# Patient Record
Sex: MALE | Race: WHITE | NOT HISPANIC OR LATINO | Employment: OTHER | URBAN - METROPOLITAN AREA
[De-identification: names, ages, dates, MRNs, and addresses within clinical notes are randomized per-mention and may not be internally consistent; named-entity substitution may affect disease eponyms.]

---

## 2018-01-04 ENCOUNTER — HOSPITAL ENCOUNTER (EMERGENCY)
Facility: HOSPITAL | Age: 63
Discharge: HOME | End: 2018-01-04
Attending: EMERGENCY MEDICINE | Admitting: EMERGENCY MEDICINE
Payer: MEDICARE

## 2018-01-04 ENCOUNTER — APPOINTMENT (EMERGENCY)
Dept: RADIOLOGY | Facility: HOSPITAL | Age: 63
End: 2018-01-04
Payer: MEDICARE

## 2018-01-04 VITALS
RESPIRATION RATE: 18 BRPM | TEMPERATURE: 97.8 F | SYSTOLIC BLOOD PRESSURE: 175 MMHG | DIASTOLIC BLOOD PRESSURE: 96 MMHG | WEIGHT: 188 LBS | HEART RATE: 74 BPM | OXYGEN SATURATION: 98 %

## 2018-01-04 DIAGNOSIS — L02.91 ABSCESS: Primary | ICD-10-CM

## 2018-01-04 LAB
ANION GAP SERPL CALCULATED.3IONS-SCNC: 10 MMOL/L (ref 4–13)
BASOPHILS # BLD AUTO: 0.05 THOUSANDS/ΜL (ref 0–0.1)
BASOPHILS NFR BLD AUTO: 1 % (ref 0–1)
BUN SERPL-MCNC: 18 MG/DL (ref 5–25)
CALCIUM SERPL-MCNC: 9.4 MG/DL (ref 8.3–10.1)
CHLORIDE SERPL-SCNC: 105 MMOL/L (ref 100–108)
CO2 SERPL-SCNC: 24 MMOL/L (ref 21–32)
CREAT SERPL-MCNC: 1.13 MG/DL (ref 0.6–1.3)
EOSINOPHIL # BLD AUTO: 0.08 THOUSAND/ΜL (ref 0–0.61)
EOSINOPHIL NFR BLD AUTO: 1 % (ref 0–6)
ERYTHROCYTE [DISTWIDTH] IN BLOOD BY AUTOMATED COUNT: 12.9 % (ref 11.6–15.1)
GFR SERPL CREATININE-BSD FRML MDRD: 68 ML/MIN/1.73SQ M
GLUCOSE SERPL-MCNC: 112 MG/DL (ref 65–140)
HCT VFR BLD AUTO: 42.5 % (ref 36.5–49.3)
HGB BLD-MCNC: 14.5 G/DL (ref 12–17)
LYMPHOCYTES # BLD AUTO: 1.58 THOUSANDS/ΜL (ref 0.6–4.47)
LYMPHOCYTES NFR BLD AUTO: 24 % (ref 14–44)
MCH RBC QN AUTO: 31.9 PG (ref 26.8–34.3)
MCHC RBC AUTO-ENTMCNC: 34.1 G/DL (ref 31.4–37.4)
MCV RBC AUTO: 93 FL (ref 82–98)
MONOCYTES # BLD AUTO: 0.52 THOUSAND/ΜL (ref 0.17–1.22)
MONOCYTES NFR BLD AUTO: 8 % (ref 4–12)
NEUTROPHILS # BLD AUTO: 4.3 THOUSANDS/ΜL (ref 1.85–7.62)
NEUTS SEG NFR BLD AUTO: 66 % (ref 43–75)
NRBC BLD AUTO-RTO: 0 /100 WBCS
PLATELET # BLD AUTO: 159 THOUSANDS/UL (ref 149–390)
PMV BLD AUTO: 10.3 FL (ref 8.9–12.7)
POTASSIUM SERPL-SCNC: 4.2 MMOL/L (ref 3.5–5.3)
RBC # BLD AUTO: 4.55 MILLION/UL (ref 3.88–5.62)
SODIUM SERPL-SCNC: 139 MMOL/L (ref 136–145)
URATE SERPL-MCNC: 6.2 MG/DL (ref 4.2–8)
WBC # BLD AUTO: 6.53 THOUSAND/UL (ref 4.31–10.16)

## 2018-01-04 PROCEDURE — 99283 EMERGENCY DEPT VISIT LOW MDM: CPT

## 2018-01-04 PROCEDURE — 84550 ASSAY OF BLOOD/URIC ACID: CPT | Performed by: PHYSICIAN ASSISTANT

## 2018-01-04 PROCEDURE — 36415 COLL VENOUS BLD VENIPUNCTURE: CPT | Performed by: PHYSICIAN ASSISTANT

## 2018-01-04 PROCEDURE — 85025 COMPLETE CBC W/AUTO DIFF WBC: CPT | Performed by: PHYSICIAN ASSISTANT

## 2018-01-04 PROCEDURE — 73630 X-RAY EXAM OF FOOT: CPT

## 2018-01-04 PROCEDURE — 96365 THER/PROPH/DIAG IV INF INIT: CPT

## 2018-01-04 PROCEDURE — 96375 TX/PRO/DX INJ NEW DRUG ADDON: CPT

## 2018-01-04 PROCEDURE — 80048 BASIC METABOLIC PNL TOTAL CA: CPT | Performed by: PHYSICIAN ASSISTANT

## 2018-01-04 RX ORDER — CLINDAMYCIN HYDROCHLORIDE 150 MG/1
150 CAPSULE ORAL 4 TIMES DAILY
Qty: 40 CAPSULE | Refills: 0 | Status: SHIPPED | OUTPATIENT
Start: 2018-01-04 | End: 2018-01-14

## 2018-01-04 RX ORDER — CLINDAMYCIN PHOSPHATE 600 MG/50ML
600 INJECTION INTRAVENOUS ONCE
Status: COMPLETED | OUTPATIENT
Start: 2018-01-04 | End: 2018-01-04

## 2018-01-04 RX ORDER — CLONAZEPAM 1 MG/1
1 TABLET ORAL
COMMUNITY

## 2018-01-04 RX ORDER — HYDROCODONE BITARTRATE AND ACETAMINOPHEN 5; 325 MG/1; MG/1
1 TABLET ORAL EVERY 6 HOURS PRN
Qty: 10 TABLET | Refills: 0 | Status: SHIPPED | OUTPATIENT
Start: 2018-01-04

## 2018-01-04 RX ORDER — KETOROLAC TROMETHAMINE 30 MG/ML
15 INJECTION, SOLUTION INTRAMUSCULAR; INTRAVENOUS ONCE
Status: COMPLETED | OUTPATIENT
Start: 2018-01-04 | End: 2018-01-04

## 2018-01-04 RX ORDER — SIMVASTATIN 10 MG
40 TABLET ORAL
COMMUNITY

## 2018-01-04 RX ORDER — LIDOCAINE HYDROCHLORIDE 10 MG/ML
10 INJECTION, SOLUTION EPIDURAL; INFILTRATION; INTRACAUDAL; PERINEURAL ONCE
Status: COMPLETED | OUTPATIENT
Start: 2018-01-04 | End: 2018-01-04

## 2018-01-04 RX ORDER — HYDROCODONE BITARTRATE AND ACETAMINOPHEN 5; 325 MG/1; MG/1
1 TABLET ORAL ONCE
Status: COMPLETED | OUTPATIENT
Start: 2018-01-04 | End: 2018-01-04

## 2018-01-04 RX ADMIN — CLINDAMYCIN PHOSPHATE 600 MG: 12 INJECTION, SOLUTION INTRAMUSCULAR; INTRAVENOUS at 14:14

## 2018-01-04 RX ADMIN — HYDROCODONE BITARTRATE AND ACETAMINOPHEN 1 TABLET: 5; 325 TABLET ORAL at 15:11

## 2018-01-04 RX ADMIN — SODIUM CHLORIDE 1000 ML: 0.9 INJECTION, SOLUTION INTRAVENOUS at 14:13

## 2018-01-04 RX ADMIN — LIDOCAINE HYDROCHLORIDE 10 ML: 10 INJECTION, SOLUTION EPIDURAL; INFILTRATION; INTRACAUDAL; PERINEURAL at 13:50

## 2018-01-04 RX ADMIN — KETOROLAC TROMETHAMINE 15 MG: 30 INJECTION, SOLUTION INTRAMUSCULAR at 14:30

## 2018-01-04 NOTE — ED PROVIDER NOTES
History  Chief Complaint   Patient presents with    Wound Check     Pt states he has a foot wound on his fourth digit  Pt states he saw his pcp on tuesday and they gave him abx  pt reportincrease swelling an redness to the the foot  Pt states it is very painful  Pt denies fevers/lossof sensation in the foot  Per ptno active bleeding      This 15-year-old male patient who presents with pain and redness dorsum of his right 4th digit  Over his D IP this been going on for 1 week  His family doctor came some oral antibiotics approximately 3 days ago without improvement is now worsening  He states the toe without getting swollen and the foot is as well  It hurts to touch her to walk or to move  No fever no chills no headache no blurred vision or double vision no cough congestion sore throat no nausea vomiting diarrhea abdominal pain no chest pain or shortness of breath  Differential diagnosis includes rhythm not limited to paronychia, abscess, gout  Prior to Admission Medications   Prescriptions Last Dose Informant Patient Reported? Taking? Aspirin 81 MG EC tablet 1/3/2018 at Unknown time  Yes Yes   Sig: Take 81 mg by mouth daily at bedtime     clonazePAM (KLONOPIN) 1 mg tablet 1/3/2018 at Unknown time  Yes Yes   Sig: Take 1 mg by mouth daily at bedtime     simvastatin (ZOCOR) 10 mg tablet 1/3/2018 at Unknown time  Yes Yes   Sig: Take 40 mg by mouth daily at bedtime        Facility-Administered Medications: None       History reviewed  No pertinent past medical history  History reviewed  No pertinent surgical history  History reviewed  No pertinent family history  I have reviewed and agree with the history as documented  Social History   Substance Use Topics    Smoking status: Never Smoker    Smokeless tobacco: Never Used    Alcohol use No        Review of Systems   All other systems reviewed and are negative        Physical Exam  ED Triage Vitals [01/04/18 1228]   Temperature Pulse Respirations Blood Pressure SpO2   97 8 °F (36 6 °C) 83 18 154/88 98 %      Temp src Heart Rate Source Patient Position - Orthostatic VS BP Location FiO2 (%)   -- Monitor Sitting Right arm --      Pain Score       Worst Possible Pain           Orthostatic Vital Signs  Vitals:    01/04/18 1228 01/04/18 1422   BP: 154/88 (!) 175/96   Pulse: 83 74   Patient Position - Orthostatic VS: Sitting        Physical Exam   Constitutional: He appears well-developed and well-nourished  HENT:   Head: Normocephalic and atraumatic  Right Ear: External ear normal    Left Ear: External ear normal    Nose: Nose normal    Mouth/Throat: Oropharynx is clear and moist    Eyes: Conjunctivae are normal  Pupils are equal, round, and reactive to light  Neck: Normal range of motion  Neck supple  Cardiovascular: Normal rate and regular rhythm  Pulmonary/Chest: Effort normal and breath sounds normal    Abdominal: Soft  Bowel sounds are normal  There is no tenderness  Musculoskeletal: Normal range of motion  Feet:    Neurological: He is alert  Skin: Skin is warm  Psychiatric: He has a normal mood and affect  His behavior is normal    Nursing note and vitals reviewed        ED Medications  Medications   sodium chloride 0 9 % bolus 1,000 mL (1,000 mL Intravenous New Bag 1/4/18 1413)   lidocaine (PF) (XYLOCAINE-MPF) 1 % injection 10 mL (10 mL Infiltration Given 1/4/18 1350)   ketorolac (TORADOL) injection 15 mg (15 mg Intravenous Given 1/4/18 1430)   clindamycin (CLEOCIN) IVPB (premix) 600 mg (600 mg Intravenous New Bag 1/4/18 1414)       Diagnostic Studies  Results Reviewed     Procedure Component Value Units Date/Time    Basic metabolic panel [[id number]] Collected:  01/04/18 1413    Lab Status:  Final result Specimen:  Blood from Arm, Right Updated:  01/04/18 1444     Sodium 139 mmol/L      Potassium 4 2 mmol/L      Chloride 105 mmol/L      CO2 24 mmol/L      Anion Gap 10 mmol/L      BUN 18 mg/dL      Creatinine 1 13 mg/dL Glucose 112 mg/dL      Calcium 9 4 mg/dL      eGFR 68 ml/min/1 73sq m     Narrative:         National Kidney Disease Education Program recommendations are as follows:  GFR calculation is accurate only with a steady state creatinine  Chronic Kidney disease less than 60 ml/min/1 73 sq  meters  Kidney failure less than 15 ml/min/1 73 sq  meters  Uric acid [[id number]]  (Normal) Collected:  01/04/18 1413    Lab Status:  Final result Specimen:  Blood from Arm, Right Updated:  01/04/18 1444     Uric Acid 6 2 mg/dL     CBC and differential [[id number]]  (Normal) Collected:  01/04/18 1413    Lab Status:  Final result Specimen:  Blood from Arm, Right Updated:  01/04/18 1421     WBC 6 53 Thousand/uL      RBC 4 55 Million/uL      Hemoglobin 14 5 g/dL      Hematocrit 42 5 %      MCV 93 fL      MCH 31 9 pg      MCHC 34 1 g/dL      RDW 12 9 %      MPV 10 3 fL      Platelets 654 Thousands/uL      nRBC 0 /100 WBCs      Neutrophils Relative 66 %      Lymphocytes Relative 24 %      Monocytes Relative 8 %      Eosinophils Relative 1 %      Basophils Relative 1 %      Neutrophils Absolute 4 30 Thousands/µL      Lymphocytes Absolute 1 58 Thousands/µL      Monocytes Absolute 0 52 Thousand/µL      Eosinophils Absolute 0 08 Thousand/µL      Basophils Absolute 0 05 Thousands/µL                  XR foot 3+ views RIGHT   Final Result by Ashok Christopher DO (01/04 1431)      Fourth digit soft tissue swelling with no acute osseous abnormality or bony erosion/destruction  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           Workstation performed: [id number]                    Procedures  Incision/Drainage  Date/Time: 1/4/2018 2:05 PM  Performed by: Anthoney Essex  Authorized by: Baldo Or     Patient location:  ED  Consent:     Consent obtained:  Verbal and written    Consent given by:  Patient    Risks discussed:  Bleeding, incomplete drainage and pain    Alternatives discussed:  Referral  Universal protocol:     Procedure explained and questions answered to patient or proxy's satisfaction: yes      Patient identity confirmed:  Verbally with patient  Location:     Type:  Abscess    Size:  2    Location: Right 4th digit toe  Pre-procedure details:     Skin preparation:  Chloraprep  Anesthesia (see MAR for exact dosages): Anesthesia method:  Local infiltration    Local anesthetic:  Lidocaine 1% w/o epi  Procedure details:     Complexity:  Simple    Needle aspiration: no      Incision types:  Stab incision    Scalpel blade:  11    Approach:  Open    Incision depth:  Skin    Wound management:  Probed and deloculated    Drainage:  Purulent    Drainage amount: Moderate    Wound treatment:  Wound left open    Packing materials:  None  Post-procedure details:     Patient tolerance of procedure: Tolerated well, no immediate complications           Phone Contacts  ED Phone Contact    ED Course  ED Course as of Jan 09 0612   Thu Jan 04, 2018   1446 BUN: 18                               MDM  CritCare Time    Disposition  Final diagnoses:   Abscess     Time reflects when diagnosis was documented in both MDM as applicable and the Disposition within this note     Time User Action Codes Description Comment    1/4/2018  2:45 PM [facility], [address] [facility] Add [L02 91] Abscess       ED Disposition     ED Disposition Condition Comment    Discharge  [facility] discharge to home/self care  Condition at discharge: Good        Follow-up Information     Follow up With Specialties Details Why Contact Info    Radha Darby MD [facility] Medicine Schedule an appointment as soon as possible for a visit Stop the antibiotic she was given yesterday and start the antibiotics were given today  [address] [address] [facility]          Patient's Medications   Discharge Prescriptions    CLINDAMYCIN (CLEOCIN) 150 MG CAPSULE    Take 1 capsule by mouth 4 (four) times a day for 10 days       Start Date: 1/4/2018  End Date: 1/14/2018       Order Dose: 150 mg       Quantity: 40 capsule    Refills: 0    DICLOFENAC SODIUM (VOLTAREN) 50 MG EC TABLET    Take 1 tablet by mouth 2 (two) times a day       Start Date: 1/4/2018  End Date: --       Order Dose: 50 mg       Quantity: 8 tablet    Refills: 0     No discharge procedures on file      ED Provider  Electronically Signed by           Lorelee Bloch, PA-C  01/04/18 [facility]  01/04/18 [facility]  01/09/18 3877

## 2018-01-04 NOTE — DISCHARGE INSTRUCTIONS
Abscess   WHAT YOU NEED TO KNOW:   A warm compress may help your abscess drain  Your healthcare provider may make a cut in the abscess so it can drain  You may need surgery to remove an abscess that is on your hands or buttocks  DISCHARGE INSTRUCTIONS:   Return to the emergency department if:   · The area around your abscess becomes very painful, warm, or has red streaks  · You have a fever and chills  · Your heart is beating faster than usual      · You feel faint or confused  Contact your healthcare provider if:   · Your abscess gets bigger or does not get better  · Your abscess returns  · You have questions or concerns about your condition or care  Medicines: You may  need any of the following:  · Antibiotics  help treat a bacterial infection  · Acetaminophen  decreases pain and fever  It is available without a doctor's order  Ask how much to take and how often to take it  Follow directions  Acetaminophen can cause liver damage if not taken correctly  · NSAIDs , such as ibuprofen, help decrease swelling, pain, and fever  This medicine is available with or without a doctor's order  NSAIDs can cause stomach bleeding or kidney problems in certain people  If you take blood thinner medicine, always ask your healthcare provider if NSAIDs are safe for you  Always read the medicine label and follow directions  · Take your medicine as directed  Contact your healthcare provider if you think your medicine is not helping or if you have side effects  Tell him or her if you are allergic to any medicine  Keep a list of the medicines, vitamins, and herbs you take  Include the amounts, and when and why you take them  Bring the list or the pill bottles to follow-up visits  Carry your medicine list with you in case of an emergency  Self-care:   · Apply a warm compress to your abscess  This will help it open and drain  Wet a washcloth in warm, but not hot, water  Apply the compress for 10 minutes  Repeat this 4 times each day  Do not  press on an abscess or try to open it with a needle  You may push the bacteria deeper or into your blood  · Do not share your clothes, towels, or sheets with anyone  This can spread the infection to others  · Wash your hands often  This can help prevent the spread of germs  Use soap and water or an alcohol-based hand rub  Care for your wound after it is drained:   · Care for your wound as directed  If your healthcare provider says it is okay, carefully remove the bandage and gauze packing  You may need to soak the gauze to get it out of your wound  Clean your wound and the area around it as directed  Dry the area and put on new, clean bandages  Change your bandages when they get wet or dirty  · Ask your healthcare provider how to change the gauze in your wound  Keep track of how many pieces of gauze are placed inside the wound  Do not put too much packing in the wound  Do not pack the gauze too tightly in your wound  Follow up with your healthcare provider in 1 to 3 days: You may need to have your packing removed or your bandage changed  Write down your questions so you remember to ask them during your visits  © 2017 [employer]  Information is for End User's use only and may not be sold, redistributed or otherwise used for commercial purposes  All illustrations and images included in CareNotes® are the copyrighted property of [employer]  or [employer]  The above information is an  only  It is not intended as medical advice for individual conditions or treatments  Talk to your doctor, nurse or pharmacist before following any medical regimen to see if it is safe and effective for you

## 2019-04-29 ENCOUNTER — TRANSCRIBE ORDERS (OUTPATIENT)
Dept: PHYSICAL THERAPY | Facility: MEDICAL CENTER | Age: 64
End: 2019-04-29

## 2019-04-29 ENCOUNTER — EVALUATION (OUTPATIENT)
Dept: PHYSICAL THERAPY | Facility: MEDICAL CENTER | Age: 64
End: 2019-04-29
Payer: MEDICARE

## 2019-04-29 DIAGNOSIS — M25.562 LEFT KNEE PAIN, UNSPECIFIED CHRONICITY: Primary | ICD-10-CM

## 2019-04-29 DIAGNOSIS — Z98.890 S/P ARTHROSCOPIC PARTIAL MEDIAL MENISCECTOMY: ICD-10-CM

## 2019-04-29 PROCEDURE — 97161 PT EVAL LOW COMPLEX 20 MIN: CPT | Performed by: PHYSICAL THERAPIST

## 2019-04-29 PROCEDURE — 97110 THERAPEUTIC EXERCISES: CPT | Performed by: PHYSICAL THERAPIST

## 2019-05-03 ENCOUNTER — OFFICE VISIT (OUTPATIENT)
Dept: PHYSICAL THERAPY | Facility: MEDICAL CENTER | Age: 64
End: 2019-05-03
Payer: MEDICARE

## 2019-05-03 DIAGNOSIS — Z98.890 S/P ARTHROSCOPIC PARTIAL MEDIAL MENISCECTOMY: ICD-10-CM

## 2019-05-03 DIAGNOSIS — M25.562 LEFT KNEE PAIN, UNSPECIFIED CHRONICITY: Primary | ICD-10-CM

## 2019-05-03 PROCEDURE — 97110 THERAPEUTIC EXERCISES: CPT | Performed by: PHYSICAL THERAPIST

## 2019-05-03 PROCEDURE — 97112 NEUROMUSCULAR REEDUCATION: CPT | Performed by: PHYSICAL THERAPIST

## 2019-05-06 ENCOUNTER — OFFICE VISIT (OUTPATIENT)
Dept: PHYSICAL THERAPY | Facility: MEDICAL CENTER | Age: 64
End: 2019-05-06
Payer: MEDICARE

## 2019-05-06 DIAGNOSIS — Z98.890 S/P ARTHROSCOPIC PARTIAL MEDIAL MENISCECTOMY: ICD-10-CM

## 2019-05-06 DIAGNOSIS — M25.562 LEFT KNEE PAIN, UNSPECIFIED CHRONICITY: Primary | ICD-10-CM

## 2019-05-06 PROCEDURE — 97110 THERAPEUTIC EXERCISES: CPT | Performed by: PHYSICAL THERAPIST

## 2019-05-06 PROCEDURE — 97112 NEUROMUSCULAR REEDUCATION: CPT | Performed by: PHYSICAL THERAPIST

## 2019-05-09 ENCOUNTER — OFFICE VISIT (OUTPATIENT)
Dept: PHYSICAL THERAPY | Facility: MEDICAL CENTER | Age: 64
End: 2019-05-09
Payer: MEDICARE

## 2019-05-09 DIAGNOSIS — Z98.890 S/P ARTHROSCOPIC PARTIAL MEDIAL MENISCECTOMY: ICD-10-CM

## 2019-05-09 DIAGNOSIS — M25.562 LEFT KNEE PAIN, UNSPECIFIED CHRONICITY: Primary | ICD-10-CM

## 2019-05-09 PROCEDURE — 97112 NEUROMUSCULAR REEDUCATION: CPT

## 2019-05-09 PROCEDURE — 97110 THERAPEUTIC EXERCISES: CPT

## 2019-05-13 ENCOUNTER — OFFICE VISIT (OUTPATIENT)
Dept: PHYSICAL THERAPY | Facility: MEDICAL CENTER | Age: 64
End: 2019-05-13
Payer: MEDICARE

## 2019-05-13 DIAGNOSIS — Z98.890 S/P ARTHROSCOPIC PARTIAL MEDIAL MENISCECTOMY: ICD-10-CM

## 2019-05-13 DIAGNOSIS — M25.562 LEFT KNEE PAIN, UNSPECIFIED CHRONICITY: Primary | ICD-10-CM

## 2019-05-13 PROCEDURE — 97110 THERAPEUTIC EXERCISES: CPT | Performed by: PHYSICAL THERAPIST

## 2019-05-13 PROCEDURE — 97112 NEUROMUSCULAR REEDUCATION: CPT | Performed by: PHYSICAL THERAPIST

## 2019-05-16 ENCOUNTER — OFFICE VISIT (OUTPATIENT)
Dept: PHYSICAL THERAPY | Facility: MEDICAL CENTER | Age: 64
End: 2019-05-16
Payer: MEDICARE

## 2019-05-16 DIAGNOSIS — M25.562 LEFT KNEE PAIN, UNSPECIFIED CHRONICITY: Primary | ICD-10-CM

## 2019-05-16 DIAGNOSIS — Z98.890 S/P ARTHROSCOPIC PARTIAL MEDIAL MENISCECTOMY: ICD-10-CM

## 2019-05-16 PROCEDURE — 97110 THERAPEUTIC EXERCISES: CPT

## 2019-05-16 PROCEDURE — 97112 NEUROMUSCULAR REEDUCATION: CPT

## 2019-05-20 ENCOUNTER — OFFICE VISIT (OUTPATIENT)
Dept: PHYSICAL THERAPY | Facility: MEDICAL CENTER | Age: 64
End: 2019-05-20
Payer: MEDICARE

## 2019-05-20 DIAGNOSIS — M25.562 LEFT KNEE PAIN, UNSPECIFIED CHRONICITY: Primary | ICD-10-CM

## 2019-05-20 DIAGNOSIS — Z98.890 S/P ARTHROSCOPIC PARTIAL MEDIAL MENISCECTOMY: ICD-10-CM

## 2019-05-20 PROCEDURE — 97112 NEUROMUSCULAR REEDUCATION: CPT

## 2019-05-20 PROCEDURE — 97110 THERAPEUTIC EXERCISES: CPT

## 2019-05-23 ENCOUNTER — OFFICE VISIT (OUTPATIENT)
Dept: PHYSICAL THERAPY | Facility: MEDICAL CENTER | Age: 64
End: 2019-05-23
Payer: MEDICARE

## 2019-05-23 DIAGNOSIS — M25.562 LEFT KNEE PAIN, UNSPECIFIED CHRONICITY: Primary | ICD-10-CM

## 2019-05-23 DIAGNOSIS — Z98.890 S/P ARTHROSCOPIC PARTIAL MEDIAL MENISCECTOMY: ICD-10-CM

## 2019-05-23 PROCEDURE — 97112 NEUROMUSCULAR REEDUCATION: CPT | Performed by: PHYSICAL THERAPIST

## 2019-05-23 PROCEDURE — 97110 THERAPEUTIC EXERCISES: CPT | Performed by: PHYSICAL THERAPIST

## 2019-05-28 ENCOUNTER — OFFICE VISIT (OUTPATIENT)
Dept: PHYSICAL THERAPY | Facility: MEDICAL CENTER | Age: 64
End: 2019-05-28
Payer: MEDICARE

## 2019-05-28 DIAGNOSIS — M25.562 LEFT KNEE PAIN, UNSPECIFIED CHRONICITY: Primary | ICD-10-CM

## 2019-05-28 DIAGNOSIS — Z98.890 S/P ARTHROSCOPIC PARTIAL MEDIAL MENISCECTOMY: ICD-10-CM

## 2019-05-28 PROCEDURE — 97110 THERAPEUTIC EXERCISES: CPT

## 2019-05-28 PROCEDURE — 97112 NEUROMUSCULAR REEDUCATION: CPT

## 2019-05-30 ENCOUNTER — OFFICE VISIT (OUTPATIENT)
Dept: PHYSICAL THERAPY | Facility: MEDICAL CENTER | Age: 64
End: 2019-05-30
Payer: MEDICARE

## 2019-05-30 DIAGNOSIS — Z98.890 S/P ARTHROSCOPIC PARTIAL MEDIAL MENISCECTOMY: ICD-10-CM

## 2019-05-30 DIAGNOSIS — M25.562 LEFT KNEE PAIN, UNSPECIFIED CHRONICITY: Primary | ICD-10-CM

## 2019-05-30 PROCEDURE — 97112 NEUROMUSCULAR REEDUCATION: CPT | Performed by: PHYSICAL THERAPIST

## 2019-05-30 PROCEDURE — 97110 THERAPEUTIC EXERCISES: CPT | Performed by: PHYSICAL THERAPIST

## 2019-06-02 ENCOUNTER — HOSPITAL ENCOUNTER (EMERGENCY)
Facility: HOSPITAL | Age: 64
Discharge: HOME | End: 2019-06-02
Attending: EMERGENCY MEDICINE | Admitting: EMERGENCY MEDICINE
Payer: MEDICARE

## 2019-06-02 ENCOUNTER — APPOINTMENT (EMERGENCY)
Dept: NON INVASIVE DIAGNOSTICS | Facility: HOSPITAL | Age: 64
End: 2019-06-02
Payer: MEDICARE

## 2019-06-02 VITALS
DIASTOLIC BLOOD PRESSURE: 86 MMHG | HEART RATE: 86 BPM | SYSTOLIC BLOOD PRESSURE: 165 MMHG | WEIGHT: 191.36 LBS | RESPIRATION RATE: 18 BRPM | TEMPERATURE: 97.9 F | OXYGEN SATURATION: 94 %

## 2019-06-02 DIAGNOSIS — I82.402 LEG DVT (DEEP VENOUS THROMBOEMBOLISM), ACUTE, LEFT (HCC): Primary | ICD-10-CM

## 2019-06-02 PROCEDURE — 99284 EMERGENCY DEPT VISIT MOD MDM: CPT | Performed by: EMERGENCY MEDICINE

## 2019-06-02 PROCEDURE — 93970 EXTREMITY STUDY: CPT

## 2019-06-02 PROCEDURE — 99283 EMERGENCY DEPT VISIT LOW MDM: CPT

## 2019-06-02 RX ADMIN — RIVAROXABAN 15 MG: 15 TABLET, FILM COATED ORAL at 20:17

## 2019-06-03 ENCOUNTER — OFFICE VISIT (OUTPATIENT)
Dept: PHYSICAL THERAPY | Facility: MEDICAL CENTER | Age: 64
End: 2019-06-03
Payer: MEDICARE

## 2019-06-03 DIAGNOSIS — Z98.890 S/P ARTHROSCOPIC PARTIAL MEDIAL MENISCECTOMY: ICD-10-CM

## 2019-06-03 DIAGNOSIS — M25.562 LEFT KNEE PAIN, UNSPECIFIED CHRONICITY: Primary | ICD-10-CM

## 2019-06-03 PROCEDURE — 97112 NEUROMUSCULAR REEDUCATION: CPT

## 2019-06-03 PROCEDURE — 97110 THERAPEUTIC EXERCISES: CPT

## 2019-06-03 PROCEDURE — 93970 EXTREMITY STUDY: CPT | Performed by: SURGERY

## 2019-06-06 ENCOUNTER — OFFICE VISIT (OUTPATIENT)
Dept: PHYSICAL THERAPY | Facility: MEDICAL CENTER | Age: 64
End: 2019-06-06
Payer: MEDICARE

## 2019-06-06 DIAGNOSIS — Z98.890 S/P ARTHROSCOPIC PARTIAL MEDIAL MENISCECTOMY: ICD-10-CM

## 2019-06-06 DIAGNOSIS — M25.562 LEFT KNEE PAIN, UNSPECIFIED CHRONICITY: Primary | ICD-10-CM

## 2019-06-06 PROCEDURE — 97110 THERAPEUTIC EXERCISES: CPT

## 2019-06-06 PROCEDURE — 97112 NEUROMUSCULAR REEDUCATION: CPT

## 2019-06-10 ENCOUNTER — OFFICE VISIT (OUTPATIENT)
Dept: PHYSICAL THERAPY | Facility: MEDICAL CENTER | Age: 64
End: 2019-06-10
Payer: MEDICARE

## 2019-06-10 DIAGNOSIS — M25.562 LEFT KNEE PAIN, UNSPECIFIED CHRONICITY: Primary | ICD-10-CM

## 2019-06-10 DIAGNOSIS — Z98.890 S/P ARTHROSCOPIC PARTIAL MEDIAL MENISCECTOMY: ICD-10-CM

## 2019-06-10 PROCEDURE — 97110 THERAPEUTIC EXERCISES: CPT

## 2019-06-10 PROCEDURE — 97112 NEUROMUSCULAR REEDUCATION: CPT

## 2019-06-13 ENCOUNTER — OFFICE VISIT (OUTPATIENT)
Dept: PHYSICAL THERAPY | Facility: MEDICAL CENTER | Age: 64
End: 2019-06-13
Payer: MEDICARE

## 2019-06-13 DIAGNOSIS — Z98.890 S/P ARTHROSCOPIC PARTIAL MEDIAL MENISCECTOMY: ICD-10-CM

## 2019-06-13 DIAGNOSIS — M25.562 LEFT KNEE PAIN, UNSPECIFIED CHRONICITY: Primary | ICD-10-CM

## 2019-06-13 PROCEDURE — 97110 THERAPEUTIC EXERCISES: CPT

## 2019-06-13 PROCEDURE — 97112 NEUROMUSCULAR REEDUCATION: CPT

## 2019-06-17 ENCOUNTER — OFFICE VISIT (OUTPATIENT)
Dept: PHYSICAL THERAPY | Facility: MEDICAL CENTER | Age: 64
End: 2019-06-17
Payer: MEDICARE

## 2019-06-17 DIAGNOSIS — M25.562 LEFT KNEE PAIN, UNSPECIFIED CHRONICITY: Primary | ICD-10-CM

## 2019-06-17 DIAGNOSIS — Z98.890 S/P ARTHROSCOPIC PARTIAL MEDIAL MENISCECTOMY: ICD-10-CM

## 2019-06-17 PROCEDURE — 97112 NEUROMUSCULAR REEDUCATION: CPT

## 2019-06-17 PROCEDURE — 97110 THERAPEUTIC EXERCISES: CPT

## 2019-06-20 ENCOUNTER — OFFICE VISIT (OUTPATIENT)
Dept: PHYSICAL THERAPY | Facility: MEDICAL CENTER | Age: 64
End: 2019-06-20
Payer: MEDICARE

## 2019-06-20 DIAGNOSIS — Z98.890 S/P ARTHROSCOPIC PARTIAL MEDIAL MENISCECTOMY: ICD-10-CM

## 2019-06-20 DIAGNOSIS — M25.562 LEFT KNEE PAIN, UNSPECIFIED CHRONICITY: Primary | ICD-10-CM

## 2019-06-20 PROCEDURE — 97110 THERAPEUTIC EXERCISES: CPT | Performed by: PHYSICAL THERAPIST

## 2019-06-20 PROCEDURE — 97112 NEUROMUSCULAR REEDUCATION: CPT | Performed by: PHYSICAL THERAPIST

## 2019-06-24 ENCOUNTER — OFFICE VISIT (OUTPATIENT)
Dept: PHYSICAL THERAPY | Facility: MEDICAL CENTER | Age: 64
End: 2019-06-24
Payer: MEDICARE

## 2019-06-24 DIAGNOSIS — Z98.890 S/P ARTHROSCOPIC PARTIAL MEDIAL MENISCECTOMY: ICD-10-CM

## 2019-06-24 DIAGNOSIS — M25.562 LEFT KNEE PAIN, UNSPECIFIED CHRONICITY: Primary | ICD-10-CM

## 2019-06-24 PROCEDURE — 97140 MANUAL THERAPY 1/> REGIONS: CPT | Performed by: PHYSICAL THERAPIST

## 2019-06-24 PROCEDURE — 97110 THERAPEUTIC EXERCISES: CPT | Performed by: PHYSICAL THERAPIST

## 2019-06-24 PROCEDURE — 97112 NEUROMUSCULAR REEDUCATION: CPT | Performed by: PHYSICAL THERAPIST

## 2019-06-27 ENCOUNTER — OFFICE VISIT (OUTPATIENT)
Dept: PHYSICAL THERAPY | Facility: MEDICAL CENTER | Age: 64
End: 2019-06-27
Payer: MEDICARE

## 2019-06-27 DIAGNOSIS — Z98.890 S/P ARTHROSCOPIC PARTIAL MEDIAL MENISCECTOMY: ICD-10-CM

## 2019-06-27 DIAGNOSIS — M25.562 LEFT KNEE PAIN, UNSPECIFIED CHRONICITY: Primary | ICD-10-CM

## 2019-06-27 PROCEDURE — 97110 THERAPEUTIC EXERCISES: CPT | Performed by: PHYSICAL THERAPIST

## 2019-07-08 ENCOUNTER — OFFICE VISIT (OUTPATIENT)
Dept: PHYSICAL THERAPY | Facility: MEDICAL CENTER | Age: 64
End: 2019-07-08
Payer: MEDICARE

## 2019-07-08 DIAGNOSIS — Z98.890 S/P ARTHROSCOPIC PARTIAL MEDIAL MENISCECTOMY: ICD-10-CM

## 2019-07-08 DIAGNOSIS — M25.562 LEFT KNEE PAIN, UNSPECIFIED CHRONICITY: Primary | ICD-10-CM

## 2019-07-08 PROCEDURE — 97140 MANUAL THERAPY 1/> REGIONS: CPT | Performed by: PHYSICAL THERAPIST

## 2019-07-08 PROCEDURE — 97110 THERAPEUTIC EXERCISES: CPT | Performed by: PHYSICAL THERAPIST

## 2019-07-08 NOTE — PROGRESS NOTES
Daily Note     Today's date: 2019  Patient name: Ravi Lunsford  : 1955  MRN: [id number]  Referring provider: Reina Zamora MD  Dx:   Encounter Diagnosis     ICD-10-CM    1  Left knee pain, unspecified chronicity M25 562    2  S/P arthroscopic partial medial meniscectomy Z98 890          Subjective: Pt reports that he decreased his activity level while on vacation, but the best he felt was when he got home and mowed the lawn and did some gardening  Pt feels like he is compensating when he walks and goes up the stairs to protect his knee  This morning his knee felt worse than prior to vacation and the only thing he can think of it that it has to do with the humid weather  Objective: See treatment diary below      Assessment: Tolerated treatment fair  Patient would benefit from continued PT  Pt responded well to tx today and had decreased pain post tx  Pt would benefit from moving normally during functional activities and avoiding repetative activities that cause pain  Plan: Continue per plan of care        Daily Treatment Diary       Exercise Diary  6/3 6/6 6/10 6/13 6/17 6/20 6/24 6/27 7/8    Bike  10' 10' 10' 10 10' 8' 10' 10' 10'    HS str 3x30" 3x30" 3x30" 3x30" 3x30" 3x30" 3x30" 3x30" 3x30"    Gastroc str 3x30" 3x30" 3x30" 3x30" 3x30" 3x30" 3x30" 3x30" 3x30"    Heel slides x30 x30 x30 x30 x30 D/C x30 NR x30    QS 5"  x50 5"  x50 5"  x50 5"  x50 5"  x50 5"  x50 5"  x50 5"  x50 5"  x50    SLR x 4 3x12 3x15 1#  3x10 3#  3x10 3#  3x12 3#  3x12 3#  3x10 3#  3x10 1#  3x10    Prone TKE [place]  21#  3x15 [place]  21#  3x15 [place]  22#  3x10 [place]  22#  3x10 [place]  25#  3x10 [place]  32#  3x15 NP SAQ  3x15  ecc focus NP    Prone HS curls 3x12 3x15 1#  3x10 3#  3x10 3#  3x10 3#  3x12 3#  3x10 3#  3x10 1#  3x10    Wall slides 3x15 5#  3x10 10#  3x10 10#  3x10 10#  3x10 10#  3x12 NP NP NP    Bridging         3x10    SLS  np np 3x 3x 3x W/  alpha  3x NP NP NP    Heel raises bilat  3x10 bilat  3x10 SL  3x10 SL  3x10 SL  3x10 SL  3x12 NP NP NP    Step up over and down L3  3x15 L3  3x15 L3  3x15 L3  3x15 np np NP NP NP    Quad str 3x30" 3x30" 3x30" 3x30" 3x30" 3x30" 3x30" 3x30" 3x30"    Standing HS curls 3x15 3x15 3x15 3x15 3x15 1#  3x10 0#  3x10 3x10  0# NP    LP np 120#  3x10 130#  3x10 150#  3x10 150#  3x15 150#  3x15 NP NP NP    Step downs     L1  3x10 L2  3x10 NP NP NP    Medial and lat tibial rot mobs grade II       HK NP NP    Knee flexion jt mobs grade II       HK NP NP    HPL medial knee       HK NP HK    KT tape L MCL         KO    MH       15' NP NP

## 2019-07-11 ENCOUNTER — OFFICE VISIT (OUTPATIENT)
Dept: PHYSICAL THERAPY | Facility: MEDICAL CENTER | Age: 64
End: 2019-07-11
Payer: MEDICARE

## 2019-07-11 DIAGNOSIS — Z98.890 S/P ARTHROSCOPIC PARTIAL MEDIAL MENISCECTOMY: ICD-10-CM

## 2019-07-11 DIAGNOSIS — M25.562 LEFT KNEE PAIN, UNSPECIFIED CHRONICITY: Primary | ICD-10-CM

## 2019-07-11 PROCEDURE — 97110 THERAPEUTIC EXERCISES: CPT

## 2019-07-15 ENCOUNTER — OFFICE VISIT (OUTPATIENT)
Dept: PHYSICAL THERAPY | Facility: MEDICAL CENTER | Age: 64
End: 2019-07-15
Payer: MEDICARE

## 2019-07-15 DIAGNOSIS — M25.562 LEFT KNEE PAIN, UNSPECIFIED CHRONICITY: Primary | ICD-10-CM

## 2019-07-15 DIAGNOSIS — Z98.890 S/P ARTHROSCOPIC PARTIAL MEDIAL MENISCECTOMY: ICD-10-CM

## 2019-07-15 PROCEDURE — 97110 THERAPEUTIC EXERCISES: CPT

## 2019-07-15 NOTE — PROGRESS NOTES
Daily Note     Today's date: 7/15/2019  Patient name: Alex Fields  : 1955  MRN: [id number]  Referring provider: Ilya Guerrero MD  Dx:   Encounter Diagnosis     ICD-10-CM    1  Left knee pain, unspecified chronicity M25 562    2  S/P arthroscopic partial medial meniscectomy Z98 890                   Subjective: Pt reports that his knee felt great with a full game of golf, but stiffened up after he relaxed and sat down  Objective: See treatment diary below      Assessment: Tolerated treatment well  Patient demonstrated fatigue post treatment, exhibited good technique with therapeutic exercises and would benefit from continued PT  Pt to slowly integrate other ex's at following PT visits if able  Plan: Continue per plan of care        Daily Treatment Diary       Exercise Diary  7/15            Bike  10'            HS str 3x30"            Gastroc str 3x30"            Heel slides x30            QS 5"  x50            SLR x 4 1#  3x15            Prone TKE SAQ  1#  3x15            Prone HS curls 1#  3x10            Wall slides np            Bridging 3x15            SLS  np            Heel raises bilat  3x10            Step up over and down np            Quad str 3x30"            Standing HS curls 3x15            LP np            Step downs np            Medial and lat tibial rot mobs grade II np            Knee flexion jt mobs grade II np            HPL medial knee np            KT tape L MCL KO            MH np

## 2019-07-18 ENCOUNTER — OFFICE VISIT (OUTPATIENT)
Dept: PHYSICAL THERAPY | Facility: MEDICAL CENTER | Age: 64
End: 2019-07-18
Payer: MEDICARE

## 2019-07-18 DIAGNOSIS — Z98.890 S/P ARTHROSCOPIC PARTIAL MEDIAL MENISCECTOMY: ICD-10-CM

## 2019-07-18 DIAGNOSIS — M25.562 LEFT KNEE PAIN, UNSPECIFIED CHRONICITY: Primary | ICD-10-CM

## 2019-07-18 PROCEDURE — 97112 NEUROMUSCULAR REEDUCATION: CPT

## 2019-07-18 PROCEDURE — 97110 THERAPEUTIC EXERCISES: CPT

## 2019-07-18 NOTE — PROGRESS NOTES
Daily Note     Today's date: 2019  Patient name: Alex Fields  : 1955  MRN: [id number]  Referring provider: Ilya Guerrero MD  Dx:   Encounter Diagnosis     ICD-10-CM    1  Left knee pain, unspecified chronicity M25 562    2  S/P arthroscopic partial medial meniscectomy Z98 890                   Subjective: Pt reports that his knee felt good with his golf game, but experienced some discomfort and "twinges" in his L knee when walking down hills  Objective: See treatment diary below      Assessment: Tolerated treatment well  Patient demonstrated fatigue post treatment, exhibited good technique with therapeutic exercises and would benefit from continued PT  Pt experienced mm fatigue with progressions made today  Plan: Continue per plan of care        Daily Treatment Diary       Exercise Diary  7/15 7/18           Bike  10' 10'           HS str 3x30" 3x30"           Gastroc str 3x30" 3x30"           Heel slides x30 x30           QS 5"  x50 5"  x50           SLR x 4 1#  3x15 2#  3x10           Prone TKE SAQ  1#  3x15 SAQ  2#  3x10           Prone HS curls 1#  3x10 2#  3x10           Wall slides np            Bridging 3x15 3x15           SLS  np W/  alpha  3x           Heel raises bilat  3x10 bilat  3x12           Step up over and down np np           Quad str 3x30" 3x30"           Standing HS curls 3x15 3x15           LP np np           Step downs np np           Medial and lat tibial rot mobs grade II np            Knee flexion jt mobs grade II np            HPL medial knee np            KT tape L MCL KO KO           MH np

## 2019-07-22 ENCOUNTER — OFFICE VISIT (OUTPATIENT)
Dept: PHYSICAL THERAPY | Facility: MEDICAL CENTER | Age: 64
End: 2019-07-22
Payer: MEDICARE

## 2019-07-22 DIAGNOSIS — Z98.890 S/P ARTHROSCOPIC PARTIAL MEDIAL MENISCECTOMY: ICD-10-CM

## 2019-07-22 DIAGNOSIS — M25.562 LEFT KNEE PAIN, UNSPECIFIED CHRONICITY: Primary | ICD-10-CM

## 2019-07-22 PROCEDURE — 97112 NEUROMUSCULAR REEDUCATION: CPT | Performed by: PHYSICAL THERAPIST

## 2019-07-22 PROCEDURE — 97110 THERAPEUTIC EXERCISES: CPT | Performed by: PHYSICAL THERAPIST

## 2019-07-22 NOTE — PROGRESS NOTES
Daily Note     Today's date: 2019  Patient name: Maco Andrade  : 1955  MRN: [id number]  Referring provider: Maria Marvin MD  Dx:   Encounter Diagnosis     ICD-10-CM    1  Left knee pain, unspecified chronicity M25 562    2  S/P arthroscopic partial medial meniscectomy Z98 890          Subjective: Pt reports that his knee is marginally better  He feels like his activity tolerance is slowly improving  Objective: See treatment diary below      Assessment: Tolerated treatment well  Patient demonstrated fatigue post treatment, exhibited good technique with therapeutic exercises and would benefit from continued PT  Pt is slowly improving in all areas  Activity tolerance is improving as pain decreases  Plan: Continue per plan of care        Daily Treatment Diary       Exercise Diary  7/15 7/18 7/22          Bike  10' 10' 10'          HS str 3x30" 3x30" 3X30"          Gastroc str 3x30" 3x30" 3x30"          Heel slides x30 x30 x30          QS 5"  x50 5"  x50 5"  x50          SLR x 4 1#  3x15 2#  3x10 2#  3x15          Prone TKE SAQ  1#  3x15 SAQ  2#  3x10 SAQ  2#  3x12          Prone HS curls 1#  3x10 2#  3x10 2#  3x15          Wall slides np            Bridging 3x15 3x15 3x15          SLS  np W/  alpha  3x W/  alpha  3X          Heel raises bilat  3x10 bilat  3x12 3x15          Step up over and down np np NP          Quad str 3x30" 3x30" 3x30"          Standing HS curls 3x15 3x15 3x15          LP np np NP          Step downs np np NP          Medial and lat tibial rot mobs grade II np  NP          Knee flexion jt mobs grade II np  NP          HPL medial knee np  NP          KT tape L MCL KO KO KO          MH np  NP

## 2019-07-25 ENCOUNTER — OFFICE VISIT (OUTPATIENT)
Dept: PHYSICAL THERAPY | Facility: MEDICAL CENTER | Age: 64
End: 2019-07-25
Payer: MEDICARE

## 2019-07-25 DIAGNOSIS — Z98.890 S/P ARTHROSCOPIC PARTIAL MEDIAL MENISCECTOMY: ICD-10-CM

## 2019-07-25 DIAGNOSIS — M25.562 LEFT KNEE PAIN, UNSPECIFIED CHRONICITY: Primary | ICD-10-CM

## 2019-07-25 PROCEDURE — 97112 NEUROMUSCULAR REEDUCATION: CPT

## 2019-07-25 PROCEDURE — 97110 THERAPEUTIC EXERCISES: CPT

## 2019-07-25 NOTE — PROGRESS NOTES
Daily Note     Today's date: 2019  Patient name: Hillary Casillas  : 1955  MRN: [id number]  Referring provider: Itzel Zavala MD  Dx:   Encounter Diagnosis     ICD-10-CM    1  Left knee pain, unspecified chronicity M25 562    2  S/P arthroscopic partial medial meniscectomy Z98 890                   Subjective: Pt reports that yesterday was the first day his knee felt good  Pt cont to experience stiffness in knee when sitting for prolonged periods of time, but overall is improving  Objective: See treatment diary below      Assessment: Tolerated treatment well  Patient demonstrated fatigue post treatment, exhibited good technique with therapeutic exercises and would benefit from continued PT  Pt progressed well today, but experienced some discomfort in medial L knee with step downs and wall slides  Plan: Continue per plan of care        Daily Treatment Diary       Exercise Diary  7/15 7/18 7/22 7/25         Bike  10' 10' 10' 10'         HS str 3x30" 3x30" 3X30" 3x30"         Gastroc str 3x30" 3x30" 3x30" 3x30"         Heel slides x30 x30 x30 x30         QS 5"  x50 5"  x50 5"  x50 5"  x50         SLR x 4 1#  3x15 2#  3x10 2#  3x15 2#  3x15         Prone TKE SAQ  1#  3x15 SAQ  2#  3x10 SAQ  2#  3x12 SAQ  2#  3x15         Prone HS curls 1#  3x10 2#  3x10 2#  3x15 2#  3x15         Wall slides np   2x10         Bridging 3x15 3x15 3x15 3x15         SLS  np W/  alpha  3x W/  alpha  3X W/  Alpha  3x         Heel raises bilat  3x10 bilat  3x12 3x15 3x15         Step up over and down np np NP NP         Quad str 3x30" 3x30" 3x30" 3x30"         Standing HS curls 3x15 3x15 3x15 3X15         LP np np NP NP         Step downs np np NP 2x10         Medial and lat tibial rot mobs grade II np  NP NP         Knee flexion jt mobs grade II np  NP NP         HPL medial knee np  NP NP         KT tape L MCL KO KO KO KO         MH np  NP NP

## 2019-07-29 ENCOUNTER — OFFICE VISIT (OUTPATIENT)
Dept: PHYSICAL THERAPY | Facility: MEDICAL CENTER | Age: 64
End: 2019-07-29
Payer: MEDICARE

## 2019-07-29 DIAGNOSIS — M25.562 LEFT KNEE PAIN, UNSPECIFIED CHRONICITY: Primary | ICD-10-CM

## 2019-07-29 DIAGNOSIS — Z98.890 S/P ARTHROSCOPIC PARTIAL MEDIAL MENISCECTOMY: ICD-10-CM

## 2019-07-29 PROCEDURE — 97110 THERAPEUTIC EXERCISES: CPT

## 2019-07-29 PROCEDURE — 97112 NEUROMUSCULAR REEDUCATION: CPT

## 2019-07-29 NOTE — PROGRESS NOTES
Daily Note     Today's date: 2019  Patient name: Waldemar Stokes  : 1955  MRN: [id number]  Referring provider: Emerson Murray MD  Dx:   Encounter Diagnosis     ICD-10-CM    1  Left knee pain, unspecified chronicity M25 562    2  S/P arthroscopic partial medial meniscectomy Z98 890                   Subjective: Pt reports that his knee felt great last Wednesday, but his discomfort returned the following day  Pt is frustrated with the discomfort putting limitations on his activity  Objective: See treatment diary below      Assessment: Tolerated treatment well  Patient demonstrated fatigue post treatment, exhibited good technique with therapeutic exercises and would benefit from continued PT  Pt unable to complete all ex's 2* time constraints  Plan: Continue per plan of care        Daily Treatment Diary       Exercise Diary  7/15 7/18 7/22 7/25 7/29        Bike  10' 10' 10' 10' 10'        HS str 3x30" 3x30" 3X30" 3x30" 3x30"        Gastroc str 3x30" 3x30" 3x30" 3x30" 3x30"        Heel slides x30 x30 x30 x30 x30        QS 5"  x50 5"  x50 5"  x50 5"  x50 5"  x50        SLR x 4 1#  3x15 2#  3x10 2#  3x15 2#  3x15 2#  3x15        Prone TKE SAQ  1#  3x15 SAQ  2#  3x10 SAQ  2#  3x12 SAQ  2#  3x15 SAQ  2#  3x15        Prone HS curls 1#  3x10 2#  3x10 2#  3x15 2#  3x15 2#  3x15        Wall slides np   2x10 np        Bridging 3x15 3x15 3x15 3x15 np        SLS  np W/  alpha  3x W/  alpha  3X W/  Alpha  3x W/  Alpha  3x        Heel raises bilat  3x10 bilat  3x12 3x15 3x15 3x15        Step up over and down np np NP NP np        Quad str 3x30" 3x30" 3x30" 3x30" 3x30"        Standing HS curls 3x15 3x15 3x15 3X15 3x15        LP np np NP NP np        Step downs np np NP 2x10 np        Medial and lat tibial rot mobs grade II np  NP NP NP        Knee flexion jt mobs grade II np  NP NP NP        HPL medial knee np  NP NP NP        KT tape L MCL KO KO KO KO KO        MH np  NP NP NP

## 2019-08-01 ENCOUNTER — OFFICE VISIT (OUTPATIENT)
Dept: PHYSICAL THERAPY | Facility: MEDICAL CENTER | Age: 64
End: 2019-08-01
Payer: MEDICARE

## 2019-08-01 DIAGNOSIS — M25.562 LEFT KNEE PAIN, UNSPECIFIED CHRONICITY: Primary | ICD-10-CM

## 2019-08-01 DIAGNOSIS — Z98.890 S/P ARTHROSCOPIC PARTIAL MEDIAL MENISCECTOMY: ICD-10-CM

## 2019-08-01 PROCEDURE — 97110 THERAPEUTIC EXERCISES: CPT

## 2019-08-01 NOTE — PROGRESS NOTES
Daily Note     Today's date: 2019  Patient name: Darlene Fang  : 1955  MRN: [id number]  Referring provider: Augustina Schmitt MD  Dx:   Encounter Diagnosis     ICD-10-CM    1  Left knee pain, unspecified chronicity M25 562    2  S/P arthroscopic partial medial meniscectomy Z98 890                   Subjective: Pt reports that his knee continues to have soreness after increased activities  Objective: See treatment diary below      Assessment: Tolerated treatment well  Patient demonstrated fatigue post treatment, exhibited good technique with therapeutic exercises and would benefit from continued PT  Pt progressing slowly with strength  Plan: Continue per plan of care        Daily Treatment Diary       Exercise Diary  7/15 7/18 7/22 7/25 7/29 8/1       Bike  10' 10' 10' 10' 10' 10'       HS str 3x30" 3x30" 3X30" 3x30" 3x30" 3x30"       Gastroc str 3x30" 3x30" 3x30" 3x30" 3x30" 3x30"       Heel slides x30 x30 x30 x30 x30 x30       QS 5"  x50 5"  x50 5"  x50 5"  x50 5"  x50 5"  x50       SLR x 4 1#  3x15 2#  3x10 2#  3x15 2#  3x15 2#  3x15 2#  3x15       Prone TKE SAQ  1#  3x15 SAQ  2#  3x10 SAQ  2#  3x12 SAQ  2#  3x15 SAQ  2#  3x15 SAQ  2#  3x15       Prone HS curls 1#  3x10 2#  3x10 2#  3x15 2#  3x15 2#  3x15 2#  3x15       Wall slides np   2x10 np 3x10       Bridging 3x15 3x15 3x15 3x15 np np       SLS  np W/  alpha  3x W/  alpha  3X W/  Alpha  3x W/  Alpha  3x W/  Alpha  3x       Heel raises bilat  3x10 bilat  3x12 3x15 3x15 3x15 3x15       Step up over and down np np NP NP np L3  3x10       Quad str 3x30" 3x30" 3x30" 3x30" 3x30" 3x30"       Standing HS curls 3x15 3x15 3x15 3X15 3x15 3x15       LP np np NP NP np np       Step downs np np NP 2x10 np np       Medial and lat tibial rot mobs grade II np  NP NP NP NR       Knee flexion jt mobs grade II np  NP NP NP NR       HPL medial knee np  NP NP NP NR       KT tape L MCL KO KO KO KO KO KO       MH np  NP NP NP NP

## 2019-08-05 ENCOUNTER — OFFICE VISIT (OUTPATIENT)
Dept: PHYSICAL THERAPY | Facility: MEDICAL CENTER | Age: 64
End: 2019-08-05
Payer: MEDICARE

## 2019-08-05 DIAGNOSIS — M25.562 LEFT KNEE PAIN, UNSPECIFIED CHRONICITY: Primary | ICD-10-CM

## 2019-08-05 DIAGNOSIS — Z98.890 S/P ARTHROSCOPIC PARTIAL MEDIAL MENISCECTOMY: ICD-10-CM

## 2019-08-05 PROCEDURE — 97112 NEUROMUSCULAR REEDUCATION: CPT

## 2019-08-05 PROCEDURE — 97110 THERAPEUTIC EXERCISES: CPT

## 2019-08-05 NOTE — PROGRESS NOTES
Daily Note     Today's date: 2019  Patient name: Marilin Kirk  : 1955  MRN: [id number]  Referring provider: Jhony Juarez MD  Dx:   Encounter Diagnosis     ICD-10-CM    1  Left knee pain, unspecified chronicity M25 562    2  S/P arthroscopic partial medial meniscectomy Z98 890                   Subjective: Pt reports that he went for a 4 mile walk the other day and felt better the longer he walked  Once he stopped and took his brace off, he felt a little twinge of pain, but decreased after he rested it and applied CP  Objective: See treatment diary below      Assessment: Tolerated treatment well  Patient demonstrated fatigue post treatment, exhibited good technique with therapeutic exercises and would benefit from continued PT      Plan: Continue per plan of care        Daily Treatment Diary       Exercise Diary  7/15 7/18 7/22 7/25 7/29 8/1 8      Bike  10' 8' 10' 10' 10' 10' 10'      HS str 3x30" 3x30" 3X30" 3x30" 3x30" 3x30" 3x30"      Gastroc str 3x30" 3x30" 3x30" 3x30" 3x30" 3x30" 3x30"      Heel slides x30 x30 x30 x30 x30 x30 x30      QS 5"  x50 5"  x50 5"  x50 5"  x50 5"  x50 5"  x50 5"  x50      SLR x 4 1#  3x15 2#  3x10 2#  3x15 2#  3x15 2#  3x15 2#  3x15 3#  3x10      Prone TKE SAQ  1#  3x15 SAQ  2#  3x10 SAQ  2#  3x12 SAQ  2#  3x15 SAQ  2#  3x15 SAQ  2#  3x15 SAQ  3#  3x10      Prone HS curls 1#  3x10 2#  3x10 2#  3x15 2#  3x15 2#  3x15 2#  3x15 3#  3x10      Wall slides np   2x10 np 3x10 3x12      Bridging 3x15 3x15 3x15 3x15 np np 3x15      SLS  np W/  alpha  3x W/  alpha  3X W/  Alpha  3x W/  Alpha  3x W/  Alpha  3x W/  Alpha  3x      Heel raises bilat  3x10 bilat  3x12 3x15 3x15 3x15 3x15 3x15      Step up over and down np np NP NP np L3  3x10 L3  3x10      Quad str 3x30" 3x30" 3x30" 3x30" 3x30" 3x30" 3x30"      Standing HS curls 3x15 3x15 3x15 3X15 3x15 3x15 3x15      LP np np NP NP np np np      Step downs np np NP 2x10 np np np      Medial and lat tibial rot mobs grade II np  NP NP NP NR NR      Knee flexion jt mobs grade II np  NP NP NP NR NR      HPL medial knee np  NP NP NP NR NR      KT tape L MCL KO KO KO KO KO KO KO      MH np  NP NP NP NP

## 2019-08-08 ENCOUNTER — OFFICE VISIT (OUTPATIENT)
Dept: PHYSICAL THERAPY | Facility: MEDICAL CENTER | Age: 64
End: 2019-08-08
Payer: MEDICARE

## 2019-08-08 DIAGNOSIS — Z98.890 S/P ARTHROSCOPIC PARTIAL MEDIAL MENISCECTOMY: ICD-10-CM

## 2019-08-08 DIAGNOSIS — M25.562 LEFT KNEE PAIN, UNSPECIFIED CHRONICITY: Primary | ICD-10-CM

## 2019-08-08 PROCEDURE — 97110 THERAPEUTIC EXERCISES: CPT

## 2019-08-08 PROCEDURE — 97112 NEUROMUSCULAR REEDUCATION: CPT

## 2019-08-08 NOTE — PROGRESS NOTES
Daily Note     Today's date: 2019  Patient name: Marilin Kirk  : 1955  MRN: [id number]  Referring provider: Jhony Juarez MD  Dx:   Encounter Diagnosis     ICD-10-CM    1  Left knee pain, unspecified chronicity M25 562    2  S/P arthroscopic partial medial meniscectomy Z98 890                   Subjective: Pt states that his knee is feeling good and is stronger and with less pain  Objective: See treatment diary below      Assessment: Tolerated treatment well  Patient demonstrated fatigue post treatment, exhibited good technique with therapeutic exercises and would benefit from continued PT  Pt is able to progress today w/o pain and improved strength  Plan: Continue per plan of care        Daily Treatment Diary       Exercise Diary  7/15 7/18 7/22 7/25 7/29 8/1 8/5 8/8     Bike  10' 8' 10' 10' 10' 10' 10' 10'     HS str 3x30" 3x30" 3X30" 3x30" 3x30" 3x30" 3x30" 3x30"     Gastroc str 3x30" 3x30" 3x30" 3x30" 3x30" 3x30" 3x30" 3x30"     Heel slides x30 x30 x30 x30 x30 x30 x30 x30     QS 5"  x50 5"  x50 5"  x50 5"  x50 5"  x50 5"  x50 5"  x50 5"  x50     SLR x 4 1#  3x15 2#  3x10 2#  3x15 2#  3x15 2#  3x15 2#  3x15 3#  3x10 3#  3x10     Prone TKE SAQ  1#  3x15 SAQ  2#  3x10 SAQ  2#  3x12 SAQ  2#  3x15 SAQ  2#  3x15 SAQ  2#  3x15 SAQ  3#  3x10 SAQ  3#  3x10     Prone HS curls 1#  3x10 2#  3x10 2#  3x15 2#  3x15 2#  3x15 2#  3x15 3#  3x10 3#  3x10     Wall slides np   2x10 np 3x10 3x12 3x12     Bridging 3x15 3x15 3x15 3x15 np np 3x15 3x15     SLS  np W/  alpha  3x W/  alpha  3X W/  Alpha  3x W/  Alpha  3x W/  Alpha  3x W/  Alpha  3x W/  Alpha  3x     Heel raises bilat  3x10 bilat  3x12 3x15 3x15 3x15 3x15 3x15 3x15     Step up over and down np np NP NP np L3  3x10 L3  3x10 L3  3x10     Quad str 3x30" 3x30" 3x30" 3x30" 3x30" 3x30" 3x30" 3x30"     Standing HS curls 3x15 3x15 3x15 3X15 3x15 3x15 3x15 3x15     LP np np NP NP np np np 110#  x10  130#  2x10     Step downs np np NP 2x10 np np np np     Medial and lat tibial rot mobs grade II np  NP NP NP NR NR NR     Knee flexion jt mobs grade II np  NP NP NP NR NR NR     HPL medial knee np  NP NP NP NR NR NR     KT tape L MCL KO KO KO KO KO KO KO KO     Rebounder w/  SLS        3x10     MH np  NP NP NP NP

## 2019-08-12 ENCOUNTER — OFFICE VISIT (OUTPATIENT)
Dept: PHYSICAL THERAPY | Facility: MEDICAL CENTER | Age: 64
End: 2019-08-12
Payer: MEDICARE

## 2019-08-12 DIAGNOSIS — M25.562 LEFT KNEE PAIN, UNSPECIFIED CHRONICITY: Primary | ICD-10-CM

## 2019-08-12 DIAGNOSIS — Z98.890 S/P ARTHROSCOPIC PARTIAL MEDIAL MENISCECTOMY: ICD-10-CM

## 2019-08-12 PROCEDURE — 97110 THERAPEUTIC EXERCISES: CPT | Performed by: PHYSICAL THERAPIST

## 2019-08-12 PROCEDURE — 97112 NEUROMUSCULAR REEDUCATION: CPT | Performed by: PHYSICAL THERAPIST

## 2019-08-12 NOTE — PROGRESS NOTES
Daily Note     Today's date: 2019  Patient name: Marilin Kirk  : 1955  MRN: [id number]  Referring provider: Jhony Juarez MD  Dx:   Encounter Diagnosis     ICD-10-CM    1  Left knee pain, unspecified chronicity M25 562    2  S/P arthroscopic partial medial meniscectomy Z98 890        Subjective: Pt reports that his knee is a little sore today after doing a 5 5 mile walk yesterday  He feels like his pain, strength and endurance are all slowly improving  Objective: See treatment diary below      Assessment: Tolerated treatment well  Patient demonstrated fatigue post treatment, exhibited good technique with therapeutic exercises and would benefit from continued PT  Quad control is normalizing  Plan: Continue per plan of care        Daily Treatment Diary       Exercise Diary  7/15 7/18 7/22 7/25 7/29 8/1 8/5 8/8 8/12    Bike  10' 8' 10' 10' 10' 10' 10' 10' 10'    HS str 3x30" 3x30" 3X30" 3x30" 3x30" 3x30" 3x30" 3x30" 3x30"    Gastroc str 3x30" 3x30" 3x30" 3x30" 3x30" 3x30" 3x30" 3x30" 3x30"    Heel slides x30 x30 x30 x30 x30 x30 x30 x30 x30    QS 5"  x50 5"  x50 5"  x50 5"  x50 5"  x50 5"  x50 5"  x50 5"  x50 5"  x50    SLR x 4 1#  3x15 2#  3x10 2#  3x15 2#  3x15 2#  3x15 2#  3x15 3#  3x10 3#  3x10 3#  3x12    Prone TKE SAQ  1#  3x15 SAQ  2#  3x10 SAQ  2#  3x12 SAQ  2#  3x15 SAQ  2#  3x15 SAQ  2#  3x15 SAQ  3#  3x10 SAQ  3#  3x10 3#  3x15  SAQ    Prone HS curls 1#  3x10 2#  3x10 2#  3x15 2#  3x15 2#  3x15 2#  3x15 3#  3x10 3#  3x10 3#  3x12    Wall slides np   2x10 np 3x10 3x12 3x12 3x15    Bridging 3x15 3x15 3x15 3x15 np np 3x15 3x15 3x15    SLS  np W/  alpha  3x W/  alpha  3X W/  Alpha  3x W/  Alpha  3x W/  Alpha  3x W/  Alpha  3x W/  Alpha  3x 2#  x3    Heel raises bilat  3x10 bilat  3x12 3x15 3x15 3x15 3x15 3x15 3x15 3x15    Step up over and down np np NP NP np L3  3x10 L3  3x10 L3  3x10 L3  3x15    Quad str 3x30" 3x30" 3x30" 3x30" 3x30" 3x30" 3x30" 3x30" 3x30"    Standing HS curls 3x15 3x15 3x15 3X15 3x15 3x15 3x15 3x15 3x15    LP np np NP NP np np np 110#  x10  130#  2x10 130#  X10  2x10    KT tape L MCL KO KO KO KO KO KO KO KO KO    Rebounder w/  SLS        3x10 2#  3x15    MH np  NP NP NP NP

## 2019-08-15 ENCOUNTER — OFFICE VISIT (OUTPATIENT)
Dept: PHYSICAL THERAPY | Facility: MEDICAL CENTER | Age: 64
End: 2019-08-15
Payer: MEDICARE

## 2019-08-15 DIAGNOSIS — M25.562 LEFT KNEE PAIN, UNSPECIFIED CHRONICITY: Primary | ICD-10-CM

## 2019-08-15 DIAGNOSIS — Z98.890 S/P ARTHROSCOPIC PARTIAL MEDIAL MENISCECTOMY: ICD-10-CM

## 2019-08-15 PROCEDURE — 97112 NEUROMUSCULAR REEDUCATION: CPT

## 2019-08-15 PROCEDURE — 97110 THERAPEUTIC EXERCISES: CPT

## 2019-08-15 NOTE — PROGRESS NOTES
Daily Note     Today's date: 8/15/2019  Patient name: Alvino Figueroa  : 1955  MRN: [id number]  Referring provider: Mortimer Rains, MD  Dx:   Encounter Diagnosis     ICD-10-CM    1  Left knee pain, unspecified chronicity M25 562    2  S/P arthroscopic partial medial meniscectomy Z98 890                   Subjective: Pt reports that he continues to have twinges of sharp, shooting pain with certain movements of his knee  Objective: See treatment diary below      Assessment: Tolerated treatment well  Patient demonstrated fatigue post treatment, exhibited good technique with therapeutic exercises and would benefit from continued PT  Pt is making steady progress with current tx plan  Plan: Continue per plan of care        Daily Treatment Diary       Exercise Diary  7/15 7/18 7/22 7/25 7/29 8/1 8/5 8/8 8/12 8/15   Bike  10' 10' 10' 10' 10' 10' 10' 10' 10' 10'   HS str 3x30" 3x30" 3X30" 3x30" 3x30" 3x30" 3x30" 3x30" 3x30" 3x30"   Gastroc str 3x30" 3x30" 3x30" 3x30" 3x30" 3x30" 3x30" 3x30" 3x30" 3x30"   Heel slides x30 x30 x30 x30 x30 x30 x30 x30 x30 x30   QS 5"  x50 5"  x50 5"  x50 5"  x50 5"  x50 5"  x50 5"  x50 5"  x50 5"  x50 5"  x50   SLR x 4 1#  3x15 2#  3x10 2#  3x15 2#  3x15 2#  3x15 2#  3x15 3#  3x10 3#  3x10 3#  3x12 3#  3x15   Prone TKE SAQ  1#  3x15 SAQ  2#  3x10 SAQ  2#  3x12 SAQ  2#  3x15 SAQ  2#  3x15 SAQ  2#  3x15 SAQ  3#  3x10 SAQ  3#  3x10 3#  3x15  SAQ 3#  3x15  SAQ   Prone HS curls 1#  3x10 2#  3x10 2#  3x15 2#  3x15 2#  3x15 2#  3x15 3#  3x10 3#  3x10 3#  3x12 3#  3x12   Wall slides np   2x10 np 3x10 3x12 3x12 3x15 3x15   Bridging 3x15 3x15 3x15 3x15 np np 3x15 3x15 3x15 3x15   SLS  np W/  alpha  3x W/  alpha  3X W/  Alpha  3x W/  Alpha  3x W/  Alpha  3x W/  Alpha  3x W/  Alpha  3x 2#  x3 2#  x3   Heel raises bilat  3x10 bilat  3x12 3x15 3x15 3x15 3x15 3x15 3x15 3x15 3x15   Step up over and down np np NP NP np L3  3x10 L3  3x10 L3  3x10 L3  3x15 L3  3x15   Quad str 3x30" 3x30" 3x30" 3x30" 3x30" 3x30" 3x30" 3x30" 3x30" 3x30"   Standing HS curls 3x15 3x15 3x15 3X15 3x15 3x15 3x15 3x15 3x15 3x15   LP np np NP NP np np np 110#  x10  130#  2x10 130#  X10  2x10 130#  3x10   KT tape L MCL KO KO KO KO KO KO KO KO KO KO   Rebounder w/  SLS        3x10 2#  3x15 2#  3x15   MH np  NP NP NP NP

## 2019-08-19 ENCOUNTER — OFFICE VISIT (OUTPATIENT)
Dept: PHYSICAL THERAPY | Facility: MEDICAL CENTER | Age: 64
End: 2019-08-19
Payer: MEDICARE

## 2019-08-19 DIAGNOSIS — Z98.890 S/P ARTHROSCOPIC PARTIAL MEDIAL MENISCECTOMY: ICD-10-CM

## 2019-08-19 DIAGNOSIS — M25.562 LEFT KNEE PAIN, UNSPECIFIED CHRONICITY: Primary | ICD-10-CM

## 2019-08-19 PROCEDURE — 97110 THERAPEUTIC EXERCISES: CPT | Performed by: PHYSICAL THERAPIST

## 2019-08-19 PROCEDURE — 97112 NEUROMUSCULAR REEDUCATION: CPT | Performed by: PHYSICAL THERAPIST

## 2019-08-19 NOTE — PROGRESS NOTES
Daily Note     Today's date: 2019  Patient name: Ravi Lunsford  : 1955  MRN: [id number]  Referring provider: Reina Zamora MD  Dx:   Encounter Diagnosis     ICD-10-CM    1  Left knee pain, unspecified chronicity M25 562    2  S/P arthroscopic partial medial meniscectomy Z98 890          Subjective: Pt reports that his L knee was a little sore after a 3 hour car ride over the weekend  Objective: See treatment diary below      Assessment: Tolerated treatment well  Patient demonstrated fatigue post treatment, exhibited good technique with therapeutic exercises and would benefit from continued PT  Plan: Continue per plan of care        Daily Treatment Diary       Exercise Diary              Bike  10'            HS str 3x30"            Gastroc str 3x30"            Heel slides x30            QS 5"  x50            SLR x 4 3#  3x15            Prone TKE 3#  SAQ  3x15            Prone HS curls 3#  3x15            Wall slides 3x15            Bridging 3x15            SLS  2#  3x            Heel raises 3x15            Step up over and down L3  3x15            Quad str 3x30"            Standing HS curls 3x15            #  3x15            KT tape L MCL KO            Rebounder w/  SLS 2#  3x15

## 2019-08-22 ENCOUNTER — OFFICE VISIT (OUTPATIENT)
Dept: PHYSICAL THERAPY | Facility: MEDICAL CENTER | Age: 64
End: 2019-08-22
Payer: MEDICARE

## 2019-08-22 DIAGNOSIS — Z98.890 S/P ARTHROSCOPIC PARTIAL MEDIAL MENISCECTOMY: ICD-10-CM

## 2019-08-22 DIAGNOSIS — M25.562 LEFT KNEE PAIN, UNSPECIFIED CHRONICITY: Primary | ICD-10-CM

## 2019-08-22 PROCEDURE — 97112 NEUROMUSCULAR REEDUCATION: CPT

## 2019-08-22 PROCEDURE — 97110 THERAPEUTIC EXERCISES: CPT

## 2019-08-22 NOTE — PROGRESS NOTES
Daily Note     Today's date: 2019  Patient name: Darlene Fang  : 1955  MRN: [id number]  Referring provider: Augustina Schmitt MD  Dx:   Encounter Diagnosis     ICD-10-CM    1  Left knee pain, unspecified chronicity M25 562    2  S/P arthroscopic partial medial meniscectomy Z98 890                   Subjective: Pt reports that his knee experienced some pain after walking on the TM for 1 hour  Pt states that he changed the incline at different levels throughout his walk and his knee felt good when on the TM, but was in pain post       Objective: See treatment diary below      Assessment: Tolerated treatment well  Patient demonstrated fatigue post treatment, exhibited good technique with therapeutic exercises and would benefit from continued PT  Pt       Plan: Continue per plan of care        Daily Treatment Diary       Exercise Diary             Bike  10' 10'           HS str 3x30" 3x30"           Gastroc str 3x30" 3x30"           Heel slides x30 x30           QS 5"  x50 5"  x50           SLR x 4 3#  3x15 3#  3x15           Prone TKE 3#  SAQ  3x15 3#  SAQ  3x15           Prone HS curls 3#  3x15 3#  3x15           Wall slides 3x15 3x15           Bridging 3x15 3x15           SLS  2#  3x 2#  3x           Heel raises 3x15 3x15           Step up over and down L3  3x15 L3  3x15           Quad str 3x30" 3x30"           Standing HS curls 3x15 3x15           #  3x15 130#  2x15  1x10           KT tape L MCL KO KO           Rebounder w/  SLS 2#  3x15 2#  3x15

## 2019-08-26 ENCOUNTER — OFFICE VISIT (OUTPATIENT)
Dept: PHYSICAL THERAPY | Facility: MEDICAL CENTER | Age: 64
End: 2019-08-26
Payer: MEDICARE

## 2019-08-26 DIAGNOSIS — M25.562 LEFT KNEE PAIN, UNSPECIFIED CHRONICITY: Primary | ICD-10-CM

## 2019-08-26 DIAGNOSIS — Z98.890 S/P ARTHROSCOPIC PARTIAL MEDIAL MENISCECTOMY: ICD-10-CM

## 2019-08-26 PROCEDURE — 97112 NEUROMUSCULAR REEDUCATION: CPT

## 2019-08-26 PROCEDURE — 97110 THERAPEUTIC EXERCISES: CPT

## 2019-08-26 NOTE — PROGRESS NOTES
Daily Note     Today's date: 2019  Patient name: Ranjit Choe  : 1955  MRN: [id number]  Referring provider: Marizol Godinez MD  Dx:   Encounter Diagnosis     ICD-10-CM    1  Left knee pain, unspecified chronicity M25 562    2  S/P arthroscopic partial medial meniscectomy Z98 890                   Subjective: Pt reports that he occasionally experiences sharp, "twinges" of pain in his knee, but overall, can perform more activities w/less pain  Objective: See treatment diary below      Assessment: Tolerated treatment well  Patient demonstrated fatigue post treatment, exhibited good technique with therapeutic exercises and would benefit from continued PT  Pt demonstrates a good understanding of exercise program and demonstrates improved L LE quad strength  Plan: Continue per plan of care        Daily Treatment Diary       Exercise Diary            Bike  10' 10' 10'          HS str 3x30" 3x30" 3x30"          Gastroc str 3x30" 3x30" 3x30"          Heel slides x30 x30 x30          QS 5"  x50 5"  x50 5"  x50          SLR x 4 3#  3x15 3#  3x15 3#  3x15          Prone TKE 3#  SAQ  3x15 3#  SAQ  3x15 3#  SAQ  3x15          Prone HS curls 3#  3x15 3#  3x15 3#  3x15          Wall slides 3x15 3x15 3x15          Bridging 3x15 3x15 3x15          SLS  2#  3x 2#  3x 2#  3x          Heel raises 3x15 3x15 3x15          Step up over and down L3  3x15 L3  3x15 np          Quad str 3x30" 3x30" 3x30"          Standing HS curls 3x15 3x15 3x15          #  3x15 130#  2x15  1x10 130#  2x15  1x10          KT tape L MCL KO KO KO          Rebounder w/  SLS 2#  3x15 2#  3x15 2#  3x15

## 2019-08-29 ENCOUNTER — OFFICE VISIT (OUTPATIENT)
Dept: PHYSICAL THERAPY | Facility: MEDICAL CENTER | Age: 64
End: 2019-08-29
Payer: MEDICARE

## 2019-08-29 DIAGNOSIS — Z98.890 S/P ARTHROSCOPIC PARTIAL MEDIAL MENISCECTOMY: ICD-10-CM

## 2019-08-29 DIAGNOSIS — M25.562 LEFT KNEE PAIN, UNSPECIFIED CHRONICITY: Primary | ICD-10-CM

## 2019-08-29 PROCEDURE — 97110 THERAPEUTIC EXERCISES: CPT | Performed by: PHYSICAL THERAPIST

## 2019-08-29 PROCEDURE — 97112 NEUROMUSCULAR REEDUCATION: CPT | Performed by: PHYSICAL THERAPIST

## 2019-08-29 NOTE — PROGRESS NOTES
Discharge Summary    Today's date: 2019  Patient name: Krys Valadez  : 1955  MRN: [id number]  Referring provider: Brenton Severance, MD  Dx:   Encounter Diagnosis     ICD-10-CM    1  Left knee pain, unspecified chronicity M25 562    2  S/P arthroscopic partial medial meniscectomy Z98 890          Subjective: Pt reports that his knee is doing well  Pt states that he has had some pretty normal days where he does not have pain  He still gets an occasional pinch, but it is inconsistent  Objective: See treatment diary below    ROM is WNL B knees    MMT: 5/5 all planes B LE      Assessment: Krys Valadez has been compliant with attending PT and home exercise program since initial eval   Michael Maxwell  has made improvements in objective data since initial evalulation and has achieved all goals  Patient reports having returned to their prior level or function  It was mutually agreed to Discharge to home exercise program at this time        Plan: D/C PT     Daily Treatment Diary       Exercise Diary           Bike  10' 10' 10' 10'         HS str 3x30" 3x30" 3x30" 3x30"         Gastroc str 3x30" 3x30" 3x30" 3x30"         Heel slides x30 x30 x30 x30         QS 5"  x50 5"  x50 5"  x50 5"  x50         SLR x 4 3#  3x15 3#  3x15 3#  3x15 4#  3x10         Prone TKE 3#  SAQ  3x15 3#  SAQ  3x15 3#  SAQ  3x15 4#  SAQ  3x15         Prone HS curls 3#  3x15 3#  3x15 3#  3x15 4#  3x10         Wall slides 3x15 3x15 3x15 3x15         Bridging 3x15 3x15 3x15 3x15         SLS  2#  3x 2#  3x 2#  3x 2#  3x         Heel raises 3x15 3x15 3x15 3x15         Step up over and down L3  3x15 L3  3x15 np L3  3x15         Quad str 3x30" 3x30" 3x30" 3x30"         Standing HS curls 3x15 3x15 3x15 3x15         #  3x15 130#  2x15  1x10 130#  2x15  1x10 150#  3x15         KT tape L MCL KO KO KO KO         Rebounder w/  SLS 2#  3x15 2#  3x15 2#  3x15 2#  3x15

## 2021-02-08 ENCOUNTER — EVALUATION (OUTPATIENT)
Dept: PHYSICAL THERAPY | Facility: MEDICAL CENTER | Age: 66
End: 2021-02-08
Payer: MEDICARE

## 2021-02-08 DIAGNOSIS — M17.11 OSTEOARTHRITIS OF RIGHT KNEE, UNSPECIFIED OSTEOARTHRITIS TYPE: Primary | ICD-10-CM

## 2021-02-08 DIAGNOSIS — Z96.651 TOTAL KNEE REPLACEMENT STATUS, RIGHT: ICD-10-CM

## 2021-02-08 PROCEDURE — 97112 NEUROMUSCULAR REEDUCATION: CPT | Performed by: PHYSICAL THERAPIST

## 2021-02-08 PROCEDURE — 97161 PT EVAL LOW COMPLEX 20 MIN: CPT | Performed by: PHYSICAL THERAPIST

## 2021-02-08 PROCEDURE — 97110 THERAPEUTIC EXERCISES: CPT | Performed by: PHYSICAL THERAPIST

## 2021-02-08 NOTE — PROGRESS NOTES
PT Evaluation     Today's date: 2021  Patient name: Nga Adler  : 1955  MRN: [id number]  Referring provider: Luis Raya MD  Dx:   Encounter Diagnosis     ICD-10-CM    1  Osteoarthritis of right knee, unspecified osteoarthritis type  M17 11    2  Total knee replacement status, right  Z96 651          Assessment  Assessment details: Pt is a pleasant 73 yo male presenting to physical therapy s/p R TKA  Pt would benefit from skilled PT to address current impairments and return pt to pre-morbid function  Understanding of Dx/Px/POC: good   Prognosis: good    Goals  Impairment Goals  - Pt I with initial HEP in 1-2 visits  - Improve ROM R knee 0-120 in 4-6 weeks  - Increase strength to 5/5 in all affected areas in 4-6 week    Functional Goals  - Increase FOTO to at least 70 in 6-8 weeks  - Patient will be independent with comprehensive HEP in 6-8 weeks  - Ambulation is improved to prior level of function in 6-8 weeks  - Stair climbing is improved to prior level of function in 6-8 weeks  - Squatting is improved to prior level of function in 6-8 weeks      Plan  Patient would benefit from: skilled physical therapy  Other planned modality interventions: Modalities prn   Planned therapy interventions: manual therapy, neuromuscular re-education, patient education, strengthening, stretching, therapeutic activities, therapeutic exercise and home exercise program  Frequency: 2x week  Duration in weeks: 8  Treatment plan discussed with: patient        Subjective Evaluation    History of Present Illness  Date of surgery: 2021  Mechanism of injury: Pt had a TKR due to degenerative arthritis  He spent an overnight in the hospital and then had 3 weeks of home care  Pt has the most soreness/discomfort/stiffness when he wakes up in the morning  Once he does some exercising, he is feeling much better  He feels like everything is improving slowly  Pt complains of some occasional anterior knee pain  Pain  Current pain ratin  At best pain ratin  At worst pain ratin    Social Support    Employment status: not working (Retired)  Exercise history: Working out at [employer], walking    Treatments  Previous treatment: physical therapy  Discharged from (in last 30 days): home health care  Patient Goals  Patient goals for therapy: decreased pain, increased motion, improved balance, increased strength, decreased edema and return to sport/leisure activities          Objective     Observations     Additional Observation Details  Gait: Antalgic with knee flexion at IC R and decreased knee flexion swing R   Pt ambulates I with a SPC    + mild swelling R knee    Balance is severely decreased R LE    Neurological Testing     Sensation     Knee   Left Knee   Intact: light touch    Right Knee   Intact: light touch     Active Range of Motion   Left Knee   Flexion: 130 degrees   Extension: 0 degrees     Right Knee   Flexion: 90 degrees   Extension: -10 degrees     Mobility   Patellar Mobility:   Left Knee   WFL: medial, lateral, superior and inferior       Right Knee   Hypomobile: medial, lateral, superior and inferior     Strength/Myotome Testing     Left Hip   Planes of Motion   Flexion: 5  Extension: 5  Abduction: 5  Adduction: 5    Right Hip   Planes of Motion   Flexion: 3  Extension: 3  Abduction: 3  Adduction: 3    Left Knee   Prone flexion: 5  Extension: 5    Right Knee   Prone flexion: 3  Extension: 3+    Additional Strength Details  Pt is able to perform an I SLR     General Comments:      Knee Comments  + Gastroc and HS tightness R      Flowsheet Rows      Most Recent Value   PT/OT G-Codes   Current Score  41   Projected Score  70             Precautions: s/p R TKR      Ther Ex 2            Bike NV            Heel slides x30            Table knee flexion x30            QS X 2 5"  x30            SLR X 4 3x15            Prone TKE 5"  3X15            HS str 3x30"            Gastroc str 3x30" Modalities 2/8             15'

## 2021-02-08 NOTE — LETTER
2021    MD Trent Enriquez    Patient: Edward Peña   YOB: 1955   Date of Visit: 2021     Encounter Diagnosis     ICD-10-CM    1  Osteoarthritis of right knee, unspecified osteoarthritis type  M17 11    2  Total knee replacement status, right  Z96 651        Dear Dr Colt Davies: Thank you for your recent referral of Edward Peña  Please review the attached evaluation summary from Randy's recent visit  Please verify that you agree with the plan of care by signing the attached order  If you have any questions or concerns, please do not hesitate to call  I sincerely appreciate the opportunity to share in the care of one of your patients and hope to have another opportunity to work with you in the near future  Sincerely,    Keenan Cam, PT      Referring Provider:      I certify that I have read the below Plan of Care and certify the need for these services furnished under this plan of treatment while under my care  MD Trent Enriquez  Via Fax: [phone]          PT Evaluation     Today's date: 2021  Patient name: Edward Peña  : 1955  MRN: [id number]  Referring provider: Emilie North MD  Dx:   Encounter Diagnosis     ICD-10-CM    1  Osteoarthritis of right knee, unspecified osteoarthritis type  M17 11    2  Total knee replacement status, right  Z96 651          Assessment  Assessment details: Pt is a pleasant 75 yo male presenting to physical therapy s/p R TKA  Pt would benefit from skilled PT to address current impairments and return pt to pre-morbid function      Understanding of Dx/Px/POC: good   Prognosis: good    Goals  Impairment Goals  - Pt I with initial HEP in 1-2 visits  - Improve ROM R knee 0-120 in 4-6 weeks  - Increase strength to 5/5 in all affected areas in 4-6 week    Functional Goals  - Increase FOTO to at least 70 in 6-8 weeks  - Patient will be independent with comprehensive HEP in 6-8 weeks  - Ambulation is improved to prior level of function in 6-8 weeks  - Stair climbing is improved to prior level of function in 6-8 weeks  - Squatting is improved to prior level of function in 6-8 weeks      Plan  Patient would benefit from: skilled physical therapy  Other planned modality interventions: Modalities prn   Planned therapy interventions: manual therapy, neuromuscular re-education, patient education, strengthening, stretching, therapeutic activities, therapeutic exercise and home exercise program  Frequency: 2x week  Duration in weeks: 8  Treatment plan discussed with: patient        Subjective Evaluation    History of Present Illness  Date of surgery: 2021  Mechanism of injury: Pt had a TKR due to degenerative arthritis  He spent an overnight in the hospital and then had 3 weeks of home care  Pt has the most soreness/discomfort/stiffness when he wakes up in the morning  Once he does some exercising, he is feeling much better  He feels like everything is improving slowly  Pt complains of some occasional anterior knee pain      Pain  Current pain ratin  At best pain ratin  At worst pain ratin    Social Support    Employment status: not working (Retired)  Exercise history: Working out at [employer]    Treatments  Previous treatment: physical therapy  Discharged from (in last 30 days): home health care  Patient Goals  Patient goals for therapy: decreased pain, increased motion, improved balance, increased strength, decreased edema and return to sport/leisure activities          Objective     Observations     Additional Observation Details  Gait: Antalgic with knee flexion at IC R and decreased knee flexion swing R   Pt ambulates I with a SPC    + mild swelling R knee    Balance is severely decreased R LE    Neurological Testing     Sensation     Knee   Left Knee   Intact: light touch    Right Knee   Intact: light touch     Active Range of Motion   Left Knee   Flexion: 130 degrees   Extension: 0 degrees     Right Knee   Flexion: 90 degrees   Extension: -10 degrees     Mobility   Patellar Mobility:   Left Knee   WFL: medial, lateral, superior and inferior       Right Knee   Hypomobile: medial, lateral, superior and inferior     Strength/Myotome Testing     Left Hip   Planes of Motion   Flexion: 5  Extension: 5  Abduction: 5  Adduction: 5    Right Hip   Planes of Motion   Flexion: 3  Extension: 3  Abduction: 3  Adduction: 3    Left Knee   Prone flexion: 5  Extension: 5    Right Knee   Prone flexion: 3  Extension: 3+    Additional Strength Details  Pt is able to perform an I SLR     General Comments:      Knee Comments  + Gastroc and HS tightness R      Flowsheet Rows      Most Recent Value   PT/OT G-Codes   Current Score  41   Projected Score  70             Precautions: s/p R TKR      Ther Ex 2/8            Bike NV            Heel slides x30            Table knee flexion x30            QS X 2 5"  x30            SLR X 4 3x15            Prone TKE 5"  3X15            HS str 3x30"            Gastroc str 3x30"                                                                                                                                                                                                                                                                   Modalities 2/8             15'

## 2021-02-09 ENCOUNTER — TRANSCRIBE ORDERS (OUTPATIENT)
Dept: PHYSICAL THERAPY | Facility: MEDICAL CENTER | Age: 66
End: 2021-02-09

## 2021-02-09 DIAGNOSIS — M17.11 OSTEOARTHRITIS OF RIGHT KNEE, UNSPECIFIED OSTEOARTHRITIS TYPE: Primary | ICD-10-CM

## 2021-02-10 ENCOUNTER — OFFICE VISIT (OUTPATIENT)
Dept: PHYSICAL THERAPY | Facility: MEDICAL CENTER | Age: 66
End: 2021-02-10
Payer: MEDICARE

## 2021-02-10 DIAGNOSIS — Z96.651 TOTAL KNEE REPLACEMENT STATUS, RIGHT: ICD-10-CM

## 2021-02-10 DIAGNOSIS — M17.11 OSTEOARTHRITIS OF RIGHT KNEE, UNSPECIFIED OSTEOARTHRITIS TYPE: Primary | ICD-10-CM

## 2021-02-10 PROCEDURE — 97112 NEUROMUSCULAR REEDUCATION: CPT

## 2021-02-10 PROCEDURE — 97110 THERAPEUTIC EXERCISES: CPT

## 2021-02-10 NOTE — PROGRESS NOTES
Daily Note     Today's date: 2/10/2021  Patient name: Kyle Mora  : 1955  MRN: [id number]  Referring provider: Dorean Cranker, MD  Dx:   Encounter Diagnosis     ICD-10-CM    1  Osteoarthritis of right knee, unspecified osteoarthritis type  M17 11    2  Total knee replacement status, right  Z96 651                   Subjective: Pt reports continued discomfort and continued difficulty sleeping 2* pain  Pt attempts to use normal gait pattern when walking around the house, but has some anterior knee pain when bringing his knee from flexion to extension  Objective: See treatment diary below      Assessment: Tolerated treatment well  Patient demonstrated fatigue post treatment, exhibited good technique with therapeutic exercises and would benefit from continued PT    Pt advised to perform gentle scar massage to control scar tissue formation, especially at distal incision  Plan: Continue per plan of care        Precautions: s/p R TKR      Ther Ex 2/8 2/10           Bike NV 10 min  Pedal  adjust           Heel slides x30 x30           Table knee flexion x30 x30           QS X 2 5"  x30 5"  x30           SLR X 4 3x15 1#  3x10           Prone TKE 5"  3X15 5"  3x15           HS str 3x30" 3x30"           Gastroc str 3x30" 3x30"           Prone knee flexion  3x10                                                                                                                                                                                                                                                     Modalities 2/8 2/10           MH 15' 15'  w/prop

## 2021-02-12 ENCOUNTER — OFFICE VISIT (OUTPATIENT)
Dept: PHYSICAL THERAPY | Facility: MEDICAL CENTER | Age: 66
End: 2021-02-12
Payer: MEDICARE

## 2021-02-12 DIAGNOSIS — Z96.651 TOTAL KNEE REPLACEMENT STATUS, RIGHT: ICD-10-CM

## 2021-02-12 DIAGNOSIS — M17.11 OSTEOARTHRITIS OF RIGHT KNEE, UNSPECIFIED OSTEOARTHRITIS TYPE: Primary | ICD-10-CM

## 2021-02-12 PROCEDURE — 97112 NEUROMUSCULAR REEDUCATION: CPT

## 2021-02-12 PROCEDURE — 97110 THERAPEUTIC EXERCISES: CPT

## 2021-02-12 NOTE — PROGRESS NOTES
Daily Note     Today's date: 2021  Patient name: Fabiola Garcia  : 1955  MRN: [id number]  Referring provider: Jann Maradiaga MD  Dx:   Encounter Diagnosis     ICD-10-CM    1  Osteoarthritis of right knee, unspecified osteoarthritis type  M17 11    2  Total knee replacement status, right  Z96 651                   Subjective: Pt reports to PT w/ increased pain in his hip flexor  Objective: See treatment diary below      Assessment: Tolerated treatment fairly well    Patient demonstrated fatigue post treatment, exhibited good technique with therapeutic exercises and would benefit from continued PT  Pt advised on performing ex's correctly and to avoid patterns that bring about pain  Plan: Continue per plan of care        Precautions: s/p R TKR      Ther Ex 2/8 2/10 2/12          Bike NV 10 min  Pedal  adjust 10 min  Pedal  adjust          Heel slides x30 x30 x30          Table knee flexion x30 x30 x30          QS X 2 5"  x30 5"  x30 5"  x30          SLR X 4 3x15 1#  3x10 1#  3x10          Prone TKE 5"  3X15 5"  3x15 5"  1#  3x10          HS str 3x30" 3x30" 3x30"          Gastroc str 3x30" 3x30" 3x30"          Prone knee flexion  3x10 3x10                                                                                                                                                                                                                                                    Modalities 2/8 2/10 2/12          MH 15' 15'  w/prop 15'  w/prop

## 2021-02-15 ENCOUNTER — OFFICE VISIT (OUTPATIENT)
Dept: PHYSICAL THERAPY | Facility: MEDICAL CENTER | Age: 66
End: 2021-02-15
Payer: MEDICARE

## 2021-02-15 DIAGNOSIS — Z96.651 TOTAL KNEE REPLACEMENT STATUS, RIGHT: ICD-10-CM

## 2021-02-15 DIAGNOSIS — M17.11 OSTEOARTHRITIS OF RIGHT KNEE, UNSPECIFIED OSTEOARTHRITIS TYPE: Primary | ICD-10-CM

## 2021-02-15 PROCEDURE — 97110 THERAPEUTIC EXERCISES: CPT

## 2021-02-15 PROCEDURE — 97140 MANUAL THERAPY 1/> REGIONS: CPT

## 2021-02-15 NOTE — PROGRESS NOTES
Daily Note     Today's date: 2/15/2021  Patient name: Amber Us  : 1955  MRN: [id number]  Referring provider: Suzy Cunningham MD  Dx:   Encounter Diagnosis     ICD-10-CM    1  Osteoarthritis of right knee, unspecified osteoarthritis type  M17 11    2  Total knee replacement status, right  Z96 651                   Subjective: Pt reports that he was outside walking around for quite a while yesterday  Pt states that his R LE is very tight today  Objective: See treatment diary below      Assessment: Tolerated treatment fairly well    Patient demonstrated fatigue post treatment, exhibited good technique with therapeutic exercises and would benefit from continued PT  Performed stretching to R LE to which pt notes having some relief  Plan: Continue per plan of care        Precautions: s/p R TKR      Ther Ex 2/8 2/10 2/12 2/15         Bike NV 10 min  Pedal  adjust 10 min  Pedal  adjust 10 min  Pedal  adjust         Heel slides x30 x30 x30 x30         Table knee flexion x30 x30 x30 x30         QS X 2 5"  x30 5"  x30 5"  x30 5"  x30         SLR X 4 3x15 1#  3x10 1#  3x10 1#  3x12         Prone TKE 5"  3X15 5"  3x15 5"  1#  3x10 5"  5#  3x12         HS str 3x30" 3x30" 3x30" 3x30"         Gastroc str 3x30" 3x30" 3x30" 3x30"         Prone knee flexion  3x10 3x10 1#  3x12                                                                                                                                                                                                                                      R LE PROM    KO         Modalities 2/8 2/10 2/12 2/15         MH 15' 15'  w/prop 15'  w/prop 15'  w/prop

## 2021-02-17 ENCOUNTER — OFFICE VISIT (OUTPATIENT)
Dept: PHYSICAL THERAPY | Facility: MEDICAL CENTER | Age: 66
End: 2021-02-17
Payer: MEDICARE

## 2021-02-17 DIAGNOSIS — Z96.651 TOTAL KNEE REPLACEMENT STATUS, RIGHT: ICD-10-CM

## 2021-02-17 DIAGNOSIS — M17.11 OSTEOARTHRITIS OF RIGHT KNEE, UNSPECIFIED OSTEOARTHRITIS TYPE: Primary | ICD-10-CM

## 2021-02-17 PROCEDURE — 97110 THERAPEUTIC EXERCISES: CPT

## 2021-02-17 PROCEDURE — 97112 NEUROMUSCULAR REEDUCATION: CPT

## 2021-02-17 PROCEDURE — 97140 MANUAL THERAPY 1/> REGIONS: CPT

## 2021-02-17 NOTE — PROGRESS NOTES
Daily Note     Today's date: 2021  Patient name: Amber Us  : 1955  MRN: [id number]  Referring provider: Suzy Cunningham MD  Dx:   Encounter Diagnosis     ICD-10-CM    1  Osteoarthritis of right knee, unspecified osteoarthritis type  M17 11    2  Total knee replacement status, right  Z96 651                   Subjective: Pt reports that he is struggling with pain in his R knee, but has been trying to work through it when performing his hep and focus on stretches at home  Pt states that he also is experiencing pain and tightness in his LB, glut, HS and calf  Pt frustrated in his lack of progress  Objective: See treatment diary below      Assessment: Tolerated treatment fairly well  Patient demonstrated fatigue post treatment, exhibited good technique with therapeutic exercises and would benefit from continued PT  Pt limited in his mobility, both flex and ext, but mostly flexion  Pt advised to perform stretches more frequently at home and to discard pillow that he's been using under his knee at night  Pt was re-educated again on correcting his technique when walking  Plan: Continue per plan of care        Precautions: s/p R TKR      Ther Ex 2/8 2/10 2/12 2/15 2/17        Bike NV 10 min  Pedal  adjust 10 min  Pedal  adjust 10 min  Pedal  adjust 10 min  Pedal  adjust        Heel slides x30 x30 x30 x30 x30        Table knee flexion x30 x30 x30 x30 x30        QS X 2 5"  x30 5"  x30 5"  x30 5"  x30 5"  x30        SLR X 4 3x15 1#  3x10 1#  3x10 1#  3x12 1#  3x15        Prone TKE 5"  3X15 5"  3x15 5"  1#  3x10 5"  5#  3x12 5"  5#  3x15        HS str 3x30" 3x30" 3x30" 3x30" 3x30"        Gastroc str 3x30" 3x30" 3x30" 3x30" 3x30"        Prone knee flexion  3x10 3x10 1#  3x12 1#  3x15 K-tape to incision     EDUARDO FORREST LE PROM    KO KO          Modalities 2/8 2/10 2/12 2/15 2/17        MH 15' 15'  w/prop 13'  w/prop 13'  w/prop 15'  w/prop

## 2021-02-19 ENCOUNTER — OFFICE VISIT (OUTPATIENT)
Dept: PHYSICAL THERAPY | Facility: MEDICAL CENTER | Age: 66
End: 2021-02-19
Payer: MEDICARE

## 2021-02-19 DIAGNOSIS — Z96.651 TOTAL KNEE REPLACEMENT STATUS, RIGHT: ICD-10-CM

## 2021-02-19 DIAGNOSIS — M17.11 OSTEOARTHRITIS OF RIGHT KNEE, UNSPECIFIED OSTEOARTHRITIS TYPE: Primary | ICD-10-CM

## 2021-02-19 PROCEDURE — 97140 MANUAL THERAPY 1/> REGIONS: CPT

## 2021-02-19 PROCEDURE — 97110 THERAPEUTIC EXERCISES: CPT

## 2021-02-19 NOTE — PROGRESS NOTES
Daily Note     Today's date: 2021  Patient name: Krishna Murray  : 1955  MRN: [id number]  Referring provider: Carmen Small MD  Dx:   Encounter Diagnosis     ICD-10-CM    1  Osteoarthritis of right knee, unspecified osteoarthritis type  M17 11    2  Total knee replacement status, right  Z96 651                   Subjective: Pt reports that he's been working very diligently with his mobility and correcting his gait pattern since LV  Pt has been feeling good w/k-tape application over incision  Objective: See treatment diary below      Assessment: Tolerated treatment well  Patient demonstrated fatigue post treatment, exhibited good technique with therapeutic exercises and would benefit from continued PT  Pt displays improved mobility w/manuals today  Plan: Continue per plan of care        Precautions: s/p R TKR      Ther Ex 2/8 2/10 2/12 2/15 2/17 2/19       Bike NV 10 min  Pedal  adjust 10 min  Pedal  adjust 10 min  Pedal  adjust 10 min  Pedal  adjust 10 min  Pedal   adjust       Heel slides x30 x30 x30 x30 x30 x30       Table knee flexion x30 x30 x30 x30 x30 x30       QS X 2 5"  x30 5"  x30 5"  x30 5"  x30 5"  x30 5"  x30       SLR X 4 3x15 1#  3x10 1#  3x10 1#  3x12 1#  3x15 1#  3x15       Prone TKE 5"  3X15 5"  3x15 5"  1#  3x10 5"  5#  3x12 5"  5#  3x15 5"  5#  3x15       HS str 3x30" 3x30" 3x30" 3x30" 3x30" 3x30"       Gastroc str 3x30" 3x30" 3x30" 3x30" 3x30" 3x30"       Prone knee flexion  3x10 3x10 1#  3x12 1#  3x15 1#  3x15       HR's      3x10                                                                                                                                                                                                          K-tape to incision     KO NR       R LE PROM    KO KO   KO       Modalities 2/8 2/10 2/12 2/15 2/17 2/19       MH 15' 15'  w/prop 13'  w/prop 13'  w/prop 13'  w/prop 15'  w/prop

## 2021-02-22 ENCOUNTER — OFFICE VISIT (OUTPATIENT)
Dept: PHYSICAL THERAPY | Facility: MEDICAL CENTER | Age: 66
End: 2021-02-22
Payer: MEDICARE

## 2021-02-22 DIAGNOSIS — Z96.651 TOTAL KNEE REPLACEMENT STATUS, RIGHT: ICD-10-CM

## 2021-02-22 DIAGNOSIS — M17.11 OSTEOARTHRITIS OF RIGHT KNEE, UNSPECIFIED OSTEOARTHRITIS TYPE: Primary | ICD-10-CM

## 2021-02-22 PROCEDURE — 97140 MANUAL THERAPY 1/> REGIONS: CPT

## 2021-02-22 PROCEDURE — 97110 THERAPEUTIC EXERCISES: CPT

## 2021-02-22 PROCEDURE — 97112 NEUROMUSCULAR REEDUCATION: CPT

## 2021-02-22 NOTE — PROGRESS NOTES
Daily Note     Today's date: 2021  Patient name: Edward Peña  : 1955  MRN: [id number]  Referring provider: Emilie North MD  Dx:   Encounter Diagnosis     ICD-10-CM    1  Osteoarthritis of right knee, unspecified osteoarthritis type  M17 11    2  Total knee replacement status, right  Z96 651                   Subjective: Pt reports that he's been working very hard over the weekend to improve his mobility  Pt was also walking more and is feeling as though his gait is improving  Objective: See treatment diary below      Assessment: Tolerated treatment well  Patient demonstrated fatigue post treatment, exhibited good technique with therapeutic exercises and would benefit from continued PT  Pt is responding well to manual therapy and demonstrated small improvement in his mobility since LV  Plan: Continue per plan of care        Precautions: s/p R TKR      Ther Ex 2/8 2/10 2/12 2/15 2/17 2/19 2/22      Bike NV 10 min  Pedal  adjust 10 min  Pedal  adjust 10 min  Pedal  adjust 10 min  Pedal  adjust 10 min  Pedal   adjust 10 min  Pedal adjust      Heel slides x30 x30 x30 x30 x30 x30 x30      Table knee flexion x30 x30 x30 x30 x30 x30 x30      QS X 2 5"  x30 5"  x30 5"  x30 5"  x30 5"  x30 5"  x30 5"  x30      SLR X 4 3x15 1#  3x10 1#  3x10 1#  3x12 1#  3x15 1#  3x15 2#  3x10      Prone TKE 5"  3X15 5"  3x15 5"  1#  3x10 5"  5#  3x12 5"  5#  3x15 5"  5#  3x15 5"  5#  3x15        HS str 3x30" 3x30" 3x30" 3x30" 3x30" 3x30" 3x30"      Gastroc str 3x30" 3x30" 3x30" 3x30" 3x30" 3x30" 3x30"      Prone knee flexion  3x10 3x10 1#  3x12 1#  3x15 1#  3x15 2#  3x10      HR's      3x10 3x10                                                                                                                                                                                                         K-tape to incision     KO NR NP      R LE PROM    KO KO   KO KO      Modalities 2/8 2/10 2/12 2/15 2/17 2/19 2/22  15' 13'  w/prop 13'  w/prop 13'  w/prop 15'  w/prop 15'  w/prop 15'  W/prop  Pre tx

## 2021-02-24 ENCOUNTER — OFFICE VISIT (OUTPATIENT)
Dept: PHYSICAL THERAPY | Facility: MEDICAL CENTER | Age: 66
End: 2021-02-24
Payer: MEDICARE

## 2021-02-24 DIAGNOSIS — M17.11 OSTEOARTHRITIS OF RIGHT KNEE, UNSPECIFIED OSTEOARTHRITIS TYPE: Primary | ICD-10-CM

## 2021-02-24 DIAGNOSIS — Z96.651 TOTAL KNEE REPLACEMENT STATUS, RIGHT: ICD-10-CM

## 2021-02-24 PROCEDURE — 97110 THERAPEUTIC EXERCISES: CPT

## 2021-02-24 PROCEDURE — 97112 NEUROMUSCULAR REEDUCATION: CPT

## 2021-02-24 PROCEDURE — 97140 MANUAL THERAPY 1/> REGIONS: CPT

## 2021-02-24 NOTE — PROGRESS NOTES
Daily Note     Today's date: 2021  Patient name: Evelio Jama  : 1955  MRN: [id number]  Referring provider: Emelia Nguyễn MD  Dx:   Encounter Diagnosis     ICD-10-CM    1  Osteoarthritis of right knee, unspecified osteoarthritis type  M17 11    2  Total knee replacement status, right  Z96 651                   Subjective: Pt reports that he still is having difficulty sleeping 2* pain  Pt has been working hard on his mobility and walking  Objective: See treatment diary below      Assessment: Tolerated treatment well  Patient demonstrated fatigue post treatment, exhibited good technique with therapeutic exercises and would benefit from continued PT   Pt's PROM is slightly improving  Did not apply k-tape today 2* slight redness from last k-tape application  Plan: Continue per plan of care        Precautions: s/p R TKR      Ther Ex 2/8 2/10 2/12 2/15 2/17 2/19 2/22 2/24     Bike NV 10 min  Pedal  adjust 10 min  Pedal  adjust 10 min  Pedal  adjust 10 min  Pedal  adjust 10 min  Pedal   adjust 10 min  Pedal adjust 10 min  Pedal adjust     Heel slides x30 x30 x30 x30 x30 x30 x30 x30     Table knee flexion x30 x30 x30 x30 x30 x30 x30 x30     QS X 2 5"  x30 5"  x30 5"  x30 5"  x30 5"  x30 5"  x30 5"  x30 5"  x30     SLR X 4 3x15 1#  3x10 1#  3x10 1#  3x12 1#  3x15 1#  3x15 2#  3x10 2#  3x10     Prone TKE 5"  3X15 5"  3x15 5"  1#  3x10 5"  5#  3x12 5"  5#  3x15 5"  5#  3x15 5"  5#  3x15   5"  5#  3x15     HS str 3x30" 3x30" 3x30" 3x30" 3x30" 3x30" 3x30" 3x30"     Gastroc str 3x30" 3x30" 3x30" 3x30" 3x30" 3x30" 3x30" 3x30"     Prone knee flexion  3x10 3x10 1#  3x12 1#  3x15 1#  3x15 2#  3x10 2#  3x10     HR's      3x10 3x10 3x10                                                                                                                                                                                                        K-tape to incision     KO NR NP NP     R LE PROM    KO KO   KO KO KO Modalities 2/8 2/10 2/12 2/15 2/17 2/19 2/22 2/24     MH 15' 15'  w/prop 15'  w/prop 13'  w/prop 13'  w/prop 13'  w/prop 13'  W/prop  Pre tx 15'  W/prop  post

## 2021-02-26 ENCOUNTER — OFFICE VISIT (OUTPATIENT)
Dept: PHYSICAL THERAPY | Facility: MEDICAL CENTER | Age: 66
End: 2021-02-26
Payer: MEDICARE

## 2021-02-26 DIAGNOSIS — Z96.651 TOTAL KNEE REPLACEMENT STATUS, RIGHT: ICD-10-CM

## 2021-02-26 DIAGNOSIS — M17.11 OSTEOARTHRITIS OF RIGHT KNEE, UNSPECIFIED OSTEOARTHRITIS TYPE: Primary | ICD-10-CM

## 2021-02-26 PROCEDURE — 97112 NEUROMUSCULAR REEDUCATION: CPT

## 2021-02-26 PROCEDURE — 97140 MANUAL THERAPY 1/> REGIONS: CPT

## 2021-02-26 PROCEDURE — 97110 THERAPEUTIC EXERCISES: CPT

## 2021-02-26 NOTE — PROGRESS NOTES
Daily Note     Today's date: 2021  Patient name: Evelio Jama  : 1955  MRN: [id number]  Referring provider: Emelia Nguyễn MD  Dx:   Encounter Diagnosis     ICD-10-CM    1  Osteoarthritis of right knee, unspecified osteoarthritis type  M17 11    2  Total knee replacement status, right  Z96 651                   Subjective: Pt reports that his knee is feeling pretty good today  Objective: See treatment diary below      Assessment: Tolerated treatment well  Patient demonstrated fatigue post treatment, exhibited good technique with therapeutic exercises and would benefit from continued PT  Pt's mobility is slowly making progress  Plan: Continue per plan of care        Precautions: s/p R TKR      Ther Ex 2/8 2/10 2/12 2/15 2/17 2/19 2/22 2/24 2/26    Bike NV 10 min  Pedal  adjust 10 min  Pedal  adjust 10 min  Pedal  adjust 10 min  Pedal  adjust 10 min  Pedal   adjust 10 min  Pedal adjust 10 min  Pedal adjust 10 min  Pedal adjust    Heel slides x30 x30 x30 x30 x30 x30 x30 x30 x30    Table knee flexion x30 x30 x30 x30 x30 x30 x30 x30 x30    QS X 2 5"  x30 5"  x30 5"  x30 5"  x30 5"  x30 5"  x30 5"  x30 5"  x30 5"  x30    SLR X 4 3x15 1#  3x10 1#  3x10 1#  3x12 1#  3x15 1#  3x15 2#  3x10 2#  3x10 2#  3x10    Prone TKE 5"  3X15 5"  3x15 5"  1#  3x10 5"  5#  3x12 5"  5#  3x15 5"  5#  3x15 5"  5#  3x15   5"  5#  3x15 5"  7#  3x10    HS str 3x30" 3x30" 3x30" 3x30" 3x30" 3x30" 3x30" 3x30" 3x30"    Gastroc str 3x30" 3x30" 3x30" 3x30" 3x30" 3x30" 3x30" 3x30" 3x30"    Prone knee flexion  3x10 3x10 1#  3x12 1#  3x15 1#  3x15 2#  3x10 2#  3x10 2#  3x10    HR's      3x10 3x10 3x10 3x10                                                                                                                                                                                                       K-tape to incision     KO NR NP NP NP    R LE PROM    KO KO   KO KO KO KO    Modalities 2/8 2/10 2/12 2/15 2/17 2/19 2/22 2/24 2/26     15' 15'  w/prop 15'  w/prop 13'  w/prop 15'  w/prop 15'  w/prop 15'  W/prop  Pre tx 15'  W/prop  post 13'  W/prop  post

## 2021-03-01 ENCOUNTER — OFFICE VISIT (OUTPATIENT)
Dept: PHYSICAL THERAPY | Facility: MEDICAL CENTER | Age: 66
End: 2021-03-01
Payer: MEDICARE

## 2021-03-01 DIAGNOSIS — Z96.651 TOTAL KNEE REPLACEMENT STATUS, RIGHT: ICD-10-CM

## 2021-03-01 DIAGNOSIS — M17.11 OSTEOARTHRITIS OF RIGHT KNEE, UNSPECIFIED OSTEOARTHRITIS TYPE: Primary | ICD-10-CM

## 2021-03-01 PROCEDURE — 97110 THERAPEUTIC EXERCISES: CPT

## 2021-03-01 PROCEDURE — 97140 MANUAL THERAPY 1/> REGIONS: CPT

## 2021-03-01 PROCEDURE — 97112 NEUROMUSCULAR REEDUCATION: CPT

## 2021-03-01 NOTE — PROGRESS NOTES
Daily Note     Today's date: 3/1/2021  Patient name: Conner Hickman  : 1955  MRN: [id number]  Referring provider: Doni Lovell MD  Dx:   Encounter Diagnosis     ICD-10-CM    1  Osteoarthritis of right knee, unspecified osteoarthritis type  M17 11    2  Total knee replacement status, right  Z96 651                   Subjective: Pt reports that his knee is feeling better and has been trying to stretch it more  Objective: See treatment diary below      Assessment: Tolerated treatment well  Patient demonstrated fatigue post treatment, exhibited good technique with therapeutic exercises and would benefit from continued PT  Pt's mobility is slowly improving  Plan: Continue per plan of care        Precautions: s/p R TKR      Ther Ex 2/8 2/10 2/12 2/15 2/17 2/19 2/22 2/24 2/26 3/1   Bike NV 10 min  Pedal  adjust 10 min  Pedal  adjust 10 min  Pedal  adjust 10 min  Pedal  adjust 10 min  Pedal   adjust 10 min  Pedal adjust 10 min  Pedal adjust 10 min  Pedal adjust 10 min  Pedal adjust   Heel slides x30 x30 x30 x30 x30 x30 x30 x30 x30 x30   Table knee flexion x30 x30 x30 x30 x30 x30 x30 x30 x30 x30   QS X 2 5"  x30 5"  x30 5"  x30 5"  x30 5"  x30 5"  x30 5"  x30 5"  x30 5"  x30 5"  x30   SLR X 4 3x15 1#  3x10 1#  3x10 1#  3x12 1#  3x15 1#  3x15 2#  3x10 2#  3x10 2#  3x10 2#  3x12   Prone TKE 5"  3X15 5"  3x15 5"  1#  3x10 5"  5#  3x12 5"  5#  3x15 5"  5#  3x15 5"  5#  3x15   5"  5#  3x15 5"  7#  3x10 5"  7#  3x10   HS str 3x30" 3x30" 3x30" 3x30" 3x30" 3x30" 3x30" 3x30" 3x30" 3x30"   Gastroc str 3x30" 3x30" 3x30" 3x30" 3x30" 3x30" 3x30" 3x30" 3x30" 3x30"   Prone knee flexion  3x10 3x10 1#  3x12 1#  3x15 1#  3x15 2#  3x10 2#  3x10 2#  3x10 2#  3x10   HR's      3x10 3x10 3x10 3x10 3x15   Wall slides          3x10                                                                                                                                                                                         K-tape to incision     KO NR NP NP NP    R LE PROM    KO KO   KO KO KO KO KO   Modalities 2/8 2/10 2/12 2/15 2/17 2/19 2/22 2/24 2/26 3/1   MH 15' 15'  w/prop 13'  w/prop 13'  w/prop 13'  w/prop 13'  w/prop 13'  W/prop  Pre tx 15'  W/prop  post 13'  W/prop  post 15'  W/  Prop  post

## 2021-03-03 ENCOUNTER — OFFICE VISIT (OUTPATIENT)
Dept: PHYSICAL THERAPY | Facility: MEDICAL CENTER | Age: 66
End: 2021-03-03
Payer: MEDICARE

## 2021-03-03 DIAGNOSIS — Z96.651 TOTAL KNEE REPLACEMENT STATUS, RIGHT: ICD-10-CM

## 2021-03-03 DIAGNOSIS — M17.11 OSTEOARTHRITIS OF RIGHT KNEE, UNSPECIFIED OSTEOARTHRITIS TYPE: Primary | ICD-10-CM

## 2021-03-03 PROCEDURE — 97112 NEUROMUSCULAR REEDUCATION: CPT

## 2021-03-03 PROCEDURE — 97110 THERAPEUTIC EXERCISES: CPT

## 2021-03-03 PROCEDURE — 97140 MANUAL THERAPY 1/> REGIONS: CPT

## 2021-03-03 NOTE — PROGRESS NOTES
Daily Note     Today's date: 3/3/2021  Patient name: Gm Banda  : 1955  MRN: [id number]  Referring provider: Sohan Stein MD  Dx:   Encounter Diagnosis     ICD-10-CM    1  Osteoarthritis of right knee, unspecified osteoarthritis type  M17 11    2  Total knee replacement status, right  Z96 651                   Subjective: Pt reports that he continues to do more walking and has been working hard on his mobility as well  Objective: See treatment diary below      Assessment: Tolerated treatment well  Patient demonstrated fatigue post treatment, exhibited good technique with therapeutic exercises and would benefit from continued PT  Pt's mobility demonstrates improvement  Plan: Continue per plan of care        Precautions: s/p R TKR      Ther Ex 3/3            Bike 10min            Heel slides x30            Table knee flexion x30            QS X 2 5"  x30            SLR X 4 2#  3x12            Prone TKE 5"  7#  3x10            HS str 3x30"            Gastroc str 3x30"            Prone knee flexion 2#  3x12            HR's 3x15            Wall slides 3x10                                                                                                                                                                                                  K-tape to incision             R LE PROM             Modalities 3/3            MH 15'  W/prop  post

## 2021-03-05 ENCOUNTER — OFFICE VISIT (OUTPATIENT)
Dept: PHYSICAL THERAPY | Facility: MEDICAL CENTER | Age: 66
End: 2021-03-05
Payer: MEDICARE

## 2021-03-05 DIAGNOSIS — M17.11 OSTEOARTHRITIS OF RIGHT KNEE, UNSPECIFIED OSTEOARTHRITIS TYPE: Primary | ICD-10-CM

## 2021-03-05 DIAGNOSIS — Z96.651 TOTAL KNEE REPLACEMENT STATUS, RIGHT: ICD-10-CM

## 2021-03-05 PROCEDURE — 97112 NEUROMUSCULAR REEDUCATION: CPT

## 2021-03-05 PROCEDURE — 97140 MANUAL THERAPY 1/> REGIONS: CPT

## 2021-03-05 PROCEDURE — 97110 THERAPEUTIC EXERCISES: CPT

## 2021-03-05 NOTE — PROGRESS NOTES
Daily Note     Today's date: 3/5/2021  Patient name: Malik Carney  : 1955  MRN: [id number]  Referring provider: Kt Butcher MD  Dx:   Encounter Diagnosis     ICD-10-CM    1  Osteoarthritis of right knee, unspecified osteoarthritis type  M17 11    2  Total knee replacement status, right  Z96 651                   Subjective: Pt reports that he was feeling great after last PT session and walked outside for approx 1/2 mile  The following day he went to the gym, did an upper body workout  and walked around the gym for a while  Pt then felt fatigued and felt sore ever since and feels as though he's had a set back  Objective: See treatment diary below      Assessment: Tolerated treatment well  Patient demonstrated fatigue post treatment, exhibited good technique with therapeutic exercises and would benefit from continued PT  Slight increased edema around R knee, but demonstrated good mobility as in LV  Plan: Continue per plan of care        Precautions: s/p R TKR      Ther Ex 3/3 3/5           Bike 10min 10 min           Heel slides x30 x30           Table knee flexion x30 x30           QS X 2 5"  x30 5"  x30           SLR X 4 2#  3x12 2#  3x12           Prone TKE 5"  7#  3x10 7#  3x10           HS str 3x30" 3x30"           Gastroc str 3x30" 3x30"           Prone knee flexion 2#  3x12 2#  3x12           HR's 3x15 3x15           Wall slides 3x10 3x10                                                                                                                                                                                                 K-tape to incision             R LE PROM             Modalities 3/3 3/5           MH 15'  W/prop  post 13'  W/prop  pre

## 2021-03-08 ENCOUNTER — APPOINTMENT (OUTPATIENT)
Dept: PHYSICAL THERAPY | Facility: MEDICAL CENTER | Age: 66
End: 2021-03-08
Payer: MEDICARE

## 2021-03-10 ENCOUNTER — OFFICE VISIT (OUTPATIENT)
Dept: PHYSICAL THERAPY | Facility: MEDICAL CENTER | Age: 66
End: 2021-03-10
Payer: MEDICARE

## 2021-03-10 DIAGNOSIS — Z23 ENCOUNTER FOR IMMUNIZATION: ICD-10-CM

## 2021-03-10 DIAGNOSIS — Z96.651 TOTAL KNEE REPLACEMENT STATUS, RIGHT: ICD-10-CM

## 2021-03-10 DIAGNOSIS — M17.11 OSTEOARTHRITIS OF RIGHT KNEE, UNSPECIFIED OSTEOARTHRITIS TYPE: Primary | ICD-10-CM

## 2021-03-10 PROCEDURE — 97110 THERAPEUTIC EXERCISES: CPT

## 2021-03-10 PROCEDURE — 97112 NEUROMUSCULAR REEDUCATION: CPT

## 2021-03-10 PROCEDURE — 97140 MANUAL THERAPY 1/> REGIONS: CPT

## 2021-03-10 NOTE — PROGRESS NOTES
Daily Note     Today's date: 3/10/2021  Patient name: Amber Us  : 1955  MRN: [id number]  Referring provider: Suzy Cunningham MD  Dx:   Encounter Diagnosis     ICD-10-CM    1  Osteoarthritis of right knee, unspecified osteoarthritis type  M17 11    2  Total knee replacement status, right  Z96 651                   Subjective: Pt reports that his knee is feeling pretty good and notes improved mobility  Pt noticed scab at distal incision the past few days, but does not bother him  Pt states he can now go up the stairs in a reciprocal pattern, but is fearful of falling going down the steps  Objective: See treatment diary below      Assessment: Tolerated treatment well  Patient demonstrated fatigue post treatment, exhibited good technique with therapeutic exercises and would benefit from continued PT  Pt's mobility is improving with each visit  Scab is present at distal incision with minimal redness at that area  Pt advised to monitor it for any changes  Plan: Continue per plan of care        Precautions: s/p R TKR      Ther Ex 3/3 3/5 3/10          Bike 10min 10 min 10 min          Heel slides x30 x30 x30          Table knee flexion x30 x30 x30          QS X 2 5"  x30 5"  x30 5"  x30          SLR X 4 2#  3x12 2#  3x12 2#  3x12          Prone TKE 5"  7#  3x10 7#  3x10 7#  3x12          HS str 3x30" 3x30" 3x30"          Gastroc str 3x30" 3x30" 3x30"          Prone knee flexion 2#  3x12 2#  3x12 2#  3x12          HR's 3x15 3x15 3x15          Wall slides 3x10 3x10 3x12                                                                                                                                                                                                K-tape to incision             R LE PROM             Modalities 3/3 3/5            15'  W/prop  post 13'  W/prop  pre

## 2021-03-12 ENCOUNTER — OFFICE VISIT (OUTPATIENT)
Dept: PHYSICAL THERAPY | Facility: MEDICAL CENTER | Age: 66
End: 2021-03-12
Payer: MEDICARE

## 2021-03-12 DIAGNOSIS — Z96.651 TOTAL KNEE REPLACEMENT STATUS, RIGHT: ICD-10-CM

## 2021-03-12 DIAGNOSIS — M17.11 OSTEOARTHRITIS OF RIGHT KNEE, UNSPECIFIED OSTEOARTHRITIS TYPE: Primary | ICD-10-CM

## 2021-03-12 PROCEDURE — 97140 MANUAL THERAPY 1/> REGIONS: CPT

## 2021-03-12 PROCEDURE — 97112 NEUROMUSCULAR REEDUCATION: CPT

## 2021-03-12 PROCEDURE — 97110 THERAPEUTIC EXERCISES: CPT

## 2021-03-15 ENCOUNTER — OFFICE VISIT (OUTPATIENT)
Dept: PHYSICAL THERAPY | Facility: MEDICAL CENTER | Age: 66
End: 2021-03-15
Payer: MEDICARE

## 2021-03-15 DIAGNOSIS — Z96.651 TOTAL KNEE REPLACEMENT STATUS, RIGHT: ICD-10-CM

## 2021-03-15 DIAGNOSIS — M17.11 OSTEOARTHRITIS OF RIGHT KNEE, UNSPECIFIED OSTEOARTHRITIS TYPE: Primary | ICD-10-CM

## 2021-03-15 PROCEDURE — 97112 NEUROMUSCULAR REEDUCATION: CPT

## 2021-03-15 PROCEDURE — 97140 MANUAL THERAPY 1/> REGIONS: CPT

## 2021-03-15 PROCEDURE — 97110 THERAPEUTIC EXERCISES: CPT

## 2021-03-15 NOTE — PROGRESS NOTES
Daily Note     Today's date: 3/15/2021  Patient name: Krishna Murray  : 1955  MRN: [id number]  Referring provider: Camren Small MD  Dx:   Encounter Diagnosis     ICD-10-CM    1  Osteoarthritis of right knee, unspecified osteoarthritis type  M17 11    2  Total knee replacement status, right  Z96 651                   Subjective: Pt reports that his knee is progressing and has been working hard on his mobility and walking endurance  Objective: See treatment diary below      Assessment: Tolerated treatment well  Patient demonstrated fatigue post treatment, exhibited good technique with therapeutic exercises and would benefit from continued PT  Pt's flexion and mobility have improved a great deal  Pt passively measured at 115 deg flexion  Plan: Continue per plan of care        Precautions: s/p R TKR      Ther Ex 3/3 3/5 3/10 3/12 3/15        Bike 10min 10 min 10 min 10 min 10 min        Heel slides x30 x30 x30 x30 x30        Table knee flexion x30 x30 x30 x30 x30        QS X 2 5"  x30 5"  x30 5"  x30 5"  x30 5"  x30        SLR X 4 2#  3x12 2#  3x12 2#  3x12 2#  3x15 3#  3x10        Prone TKE 5"  7#  3x10 7#  3x10 7#  3x12 7#  3x15 10#  3x10        HS str 3x30" 3x30" 3x30" 3x30" 3x30"        Gastroc str 3x30" 3x30" 3x30" 3x30" 3x30"        Prone knee flexion 2#  3x12 2#  3x12 2#  3x12 2#  3x15 3#  3x10        HR's 3x15 3x15 3x15 3x15 3x15        Wall slides 3x10 3x10 3x12 3x15 3x15        SLS    15"x3 30"x3                                                                                                                                                                                 K-tape to incision             R LE PROM             Modalities 3/3 3/5  3/12 3/15        MH 15'  W/prop  post 13'  W/prop  pre  13'  W/prop  post 13'  W/prop  post

## 2021-03-17 ENCOUNTER — OFFICE VISIT (OUTPATIENT)
Dept: PHYSICAL THERAPY | Facility: MEDICAL CENTER | Age: 66
End: 2021-03-17
Payer: MEDICARE

## 2021-03-17 DIAGNOSIS — Z96.651 TOTAL KNEE REPLACEMENT STATUS, RIGHT: ICD-10-CM

## 2021-03-17 DIAGNOSIS — M17.11 OSTEOARTHRITIS OF RIGHT KNEE, UNSPECIFIED OSTEOARTHRITIS TYPE: Primary | ICD-10-CM

## 2021-03-17 PROCEDURE — 97110 THERAPEUTIC EXERCISES: CPT

## 2021-03-17 PROCEDURE — 97140 MANUAL THERAPY 1/> REGIONS: CPT

## 2021-03-17 PROCEDURE — 97112 NEUROMUSCULAR REEDUCATION: CPT

## 2021-03-17 NOTE — PROGRESS NOTES
Daily Note     Today's date: 3/17/2021  Patient name: Ronnell Hinkle  : 1955  MRN: [id number]  Referring provider: Sherly Garsia MD  Dx:   Encounter Diagnosis     ICD-10-CM    1  Osteoarthritis of right knee, unspecified osteoarthritis type  M17 11    2  Total knee replacement status, right  Z96 651                   Subjective: Pt reports that his knee feels stiff today due to prolonged standing and walking on a hard surface yesterday  Objective: See treatment diary below      Assessment: Tolerated treatment well  Patient demonstrated fatigue post treatment, exhibited good technique with therapeutic exercises and would benefit from continued PT  Pt's mobility and strength are making consistent progress in PT  Pt is compliant with his hep and is making good progress because of this  Plan: Continue per plan of care        Precautions: s/p R TKR      Ther Ex 3/3 3/5 3/10 3/12 3/15 3/17       Bike 10min 10 min 10 min 10 min 10 min 10 min       Heel slides x30 x30 x30 x30 x30 x30       Table knee flexion x30 x30 x30 x30 x30 D/C       QS X 2 5"  x30 5"  x30 5"  x30 5"  x30 5"  x30 5"  x30       SLR X 4 2#  3x12 2#  3x12 2#  3x12 2#  3x15 3#  3x10 3#  3x10       Prone TKE 5"  7#  3x10 7#  3x10 7#  3x12 7#  3x15 10#  3x10 10#  3x12       HS str 3x30" 3x30" 3x30" 3x30" 3x30" 3x30"       Gastroc str 3x30" 3x30" 3x30" 3x30" 3x30" 3x30"       Prone knee flexion 2#  3x12 2#  3x12 2#  3x12 2#  3x15 3#  3x10 3#  3x10       HR's 3x15 3x15 3x15 3x15 3x15 3x15       Wall slides 3x10 3x10 3x12 3x15 3x15 3x15       SLS    15"x3 30"x3 30"x3                                                                                                                                                                                K-tape to incision             R LE PROM             Modalities 3/3 3/5  3/12 3/15 3/17       MH 15'  W/prop  post 13'  W/prop  pre  13'  W/prop  post 13'  W/prop  post 15'  W/prop  post

## 2021-03-19 ENCOUNTER — OFFICE VISIT (OUTPATIENT)
Dept: PHYSICAL THERAPY | Facility: MEDICAL CENTER | Age: 66
End: 2021-03-19
Payer: MEDICARE

## 2021-03-19 DIAGNOSIS — M17.11 OSTEOARTHRITIS OF RIGHT KNEE, UNSPECIFIED OSTEOARTHRITIS TYPE: Primary | ICD-10-CM

## 2021-03-19 DIAGNOSIS — Z96.651 TOTAL KNEE REPLACEMENT STATUS, RIGHT: ICD-10-CM

## 2021-03-19 PROCEDURE — 97140 MANUAL THERAPY 1/> REGIONS: CPT

## 2021-03-19 PROCEDURE — 97112 NEUROMUSCULAR REEDUCATION: CPT

## 2021-03-19 PROCEDURE — 97110 THERAPEUTIC EXERCISES: CPT

## 2021-03-19 NOTE — PROGRESS NOTES
Daily Note     Today's date: 3/19/2021  Patient name: Evelio Jama  : 1955  MRN: [id number]  Referring provider: Emelia Nguyễn MD  Dx:   Encounter Diagnosis     ICD-10-CM    1  Osteoarthritis of right knee, unspecified osteoarthritis type  M17 11    2  Total knee replacement status, right  Z96 651                   Subjective: Pt states that his L knee is starting to be more bothersome, but his R continues to improve  Objective: See treatment diary below      Assessment: Tolerated treatment well  Patient demonstrated fatigue post treatment, exhibited good technique with therapeutic exercises and would benefit from continued PT  Pt's mobility and strength are continuously improving  Progress at NV if able  Plan: Continue per plan of care        Precautions: s/p R TKR      Ther Ex 3/3 3/5 3/10 3/12 3/15 3/17 3/19      Bike 10min 10 min 10 min 10 min 10 min 10 min 10 min      Heel slides x30 x30 x30 x30 x30 x30 x30      Table knee flexion x30 x30 x30 x30 x30 D/C       QS X 2 5"  x30 5"  x30 5"  x30 5"  x30 5"  x30 5"  x30 5"  x30      SLR X 4 2#  3x12 2#  3x12 2#  3x12 2#  3x15 3#  3x10 3#  3x10 3#  3x12      Prone TKE 5"  7#  3x10 7#  3x10 7#  3x12 7#  3x15 10#  3x10 10#  3x12 10#  3x12      HS str 3x30" 3x30" 3x30" 3x30" 3x30" 3x30" 3x30"      Gastroc str 3x30" 3x30" 3x30" 3x30" 3x30" 3x30" 3x30"      Prone knee flexion 2#  3x12 2#  3x12 2#  3x12 2#  3x15 3#  3x10 3#  3x10 3#  3x12      HR's 3x15 3x15 3x15 3x15 3x15 3x15 3x15      Wall slides 3x10 3x10 3x12 3x15 3x15 3x15 3x15      SLS    15"x3 30"x3 30"x3 30"x3                                                                                                                                                                               K-tape to incision             R LE PROM             Modalities 3/3 3/5  3/12 3/15 3/17 3/19      MH 15'  W/prop  post 13'  W/prop  pre  13'  W/prop  post 13'  W/prop  post '  W/prop  post 15'  W/prop  post

## 2021-03-22 ENCOUNTER — OFFICE VISIT (OUTPATIENT)
Dept: PHYSICAL THERAPY | Facility: MEDICAL CENTER | Age: 66
End: 2021-03-22
Payer: MEDICARE

## 2021-03-22 DIAGNOSIS — M17.11 OSTEOARTHRITIS OF RIGHT KNEE, UNSPECIFIED OSTEOARTHRITIS TYPE: Primary | ICD-10-CM

## 2021-03-22 DIAGNOSIS — Z96.651 TOTAL KNEE REPLACEMENT STATUS, RIGHT: ICD-10-CM

## 2021-03-22 PROCEDURE — 97112 NEUROMUSCULAR REEDUCATION: CPT

## 2021-03-22 PROCEDURE — 97140 MANUAL THERAPY 1/> REGIONS: CPT

## 2021-03-22 PROCEDURE — 97110 THERAPEUTIC EXERCISES: CPT

## 2021-03-22 NOTE — PROGRESS NOTES
Daily Note     Today's date: 3/22/2021  Patient name: Ronnell Hinkle  : 1955  MRN: [id number]  Referring provider: Sherly Garsia MD  Dx:   Encounter Diagnosis     ICD-10-CM    1  Osteoarthritis of right knee, unspecified osteoarthritis type  M17 11    2  Total knee replacement status, right  Z96 651                   Subjective: Pt reports that he has been walking longer distances and his knee is feeling better with this  Objective: See treatment diary below      Assessment: Tolerated treatment well  Patient demonstrated fatigue post treatment, exhibited good technique with therapeutic exercises and would benefit from continued PT  Pt's mobility cont to improve and demonstrates improved quad strength  Plan: Continue per plan of care        Precautions: s/p R TKR      Ther Ex 3/3 3/5 3/10 3/12 3/15 3/17 3/19 3/22     Bike 10min 10 min 10 min 10 min 10 min 10 min 10 min 10 min     Heel slides x30 x30 x30 x30 x30 x30 x30 x30     Table knee flexion x30 x30 x30 x30 x30 D/C       QS X 2 5"  x30 5"  x30 5"  x30 5"  x30 5"  x30 5"  x30 5"  x30 5"  x30     SLR X 4 2#  3x12 2#  3x12 2#  3x12 2#  3x15 3#  3x10 3#  3x10 3#  3x12 3#  3x12     Prone TKE 5"  7#  3x10 7#  3x10 7#  3x12 7#  3x15 10#  3x10 10#  3x12 10#  3x12 10#  3x15     HS str 3x30" 3x30" 3x30" 3x30" 3x30" 3x30" 3x30" 3x30"     Gastroc str 3x30" 3x30" 3x30" 3x30" 3x30" 3x30" 3x30" 3x30"     Prone knee flexion 2#  3x12 2#  3x12 2#  3x12 2#  3x15 3#  3x10 3#  3x10 3#  3x12 3#  3x12     HR's 3x15 3x15 3x15 3x15 3x15 3x15 3x15 3x15     Wall slides 3x10 3x10 3x12 3x15 3x15 3x15 3x15 3x15     SLS    15"x3 30"x3 30"x3 30"x3 30"x3     Step downs        L1  3x10                                                                                                                                                                   K-tape to incision             R LE PROM             Modalities 3/3 3/5  3/12 3/15 3/17 3/19 3/22      15'  W/prop  post 15'  W/prop  pre  13'  W/prop  post 13'  W/prop  post 13'  W/prop  post 13'  W/prop  post 13'  W/prop  post

## 2021-03-24 ENCOUNTER — OFFICE VISIT (OUTPATIENT)
Dept: PHYSICAL THERAPY | Facility: MEDICAL CENTER | Age: 66
End: 2021-03-24
Payer: MEDICARE

## 2021-03-24 DIAGNOSIS — M17.11 OSTEOARTHRITIS OF RIGHT KNEE, UNSPECIFIED OSTEOARTHRITIS TYPE: Primary | ICD-10-CM

## 2021-03-24 DIAGNOSIS — Z96.651 TOTAL KNEE REPLACEMENT STATUS, RIGHT: ICD-10-CM

## 2021-03-24 PROCEDURE — 97110 THERAPEUTIC EXERCISES: CPT

## 2021-03-24 PROCEDURE — 97140 MANUAL THERAPY 1/> REGIONS: CPT

## 2021-03-24 NOTE — PROGRESS NOTES
Daily Note     Today's date: 3/24/2021  Patient name: Nanette Mercado  : 1955  MRN: [id number]  Referring provider: Anita Kay MD  Dx:   Encounter Diagnosis     ICD-10-CM    1  Osteoarthritis of right knee, unspecified osteoarthritis type  M17 11    2  Total knee replacement status, right  Z96 651                   Subjective: Pt states that his knee feels more stiff today  Pt walked for 2 miles yesterday and was more active in general the past few days  Objective: See treatment diary below      Assessment: Tolerated treatment well  Patient demonstrated fatigue post treatment, exhibited good technique with therapeutic exercises and would benefit from continued PT  Pt's mobility continues to improve  Applied k-tape to incision  Plan: Continue per plan of care        Precautions: s/p R TKR      Ther Ex 3/3 3/5 3/10 3/12 3/15 3/17 3/19 3/22 3/24    Bike 10min 10 min 10 min 10 min 10 min 10 min 10 min 10 min 10 min    Heel slides x30 x30 x30 x30 x30 x30 x30 x30 x30    Table knee flexion x30 x30 x30 x30 x30 D/C       QS X 2 5"  x30 5"  x30 5"  x30 5"  x30 5"  x30 5"  x30 5"  x30 5"  x30 5"  x30    SLR X 4 2#  3x12 2#  3x12 2#  3x12 2#  3x15 3#  3x10 3#  3x10 3#  3x12 3#  3x12 3#  3x15    Prone TKE 5"  7#  3x10 7#  3x10 7#  3x12 7#  3x15 10#  3x10 10#  3x12 10#  3x12 10#  3x15 10#  3x15    HS str 3x30" 3x30" 3x30" 3x30" 3x30" 3x30" 3x30" 3x30" 3x30"    Gastroc str 3x30" 3x30" 3x30" 3x30" 3x30" 3x30" 3x30" 3x30" 3x30"    Prone knee flexion 2#  3x12 2#  3x12 2#  3x12 2#  3x15 3#  3x10 3#  3x10 3#  3x12 3#  3x12 3#  3x15    HR's 3x15 3x15 3x15 3x15 3x15 3x15 3x15 3x15 3x15    Wall slides 3x10 3x10 3x12 3x15 3x15 3x15 3x15 3x15 5#  3x10    SLS    15"x3 30"x3 30"x3 30"x3 30"x3 30"x3    Step downs        L1  3x10   L2  3x10                                                                                                                                                                K-tape to incision R LE PROM             Modalities 3/3 3/5  3/12 3/15 3/17 3/19 3/22 3/24    MH 15'  W/prop  post 13'  W/prop  pre  13'  W/prop  post 13'  W/prop  post 13'  W/prop  post 13'  W/prop  post 15'  W/prop  post 15'  W/prop  post

## 2021-03-26 ENCOUNTER — OFFICE VISIT (OUTPATIENT)
Dept: PHYSICAL THERAPY | Facility: MEDICAL CENTER | Age: 66
End: 2021-03-26
Payer: MEDICARE

## 2021-03-26 DIAGNOSIS — Z96.651 TOTAL KNEE REPLACEMENT STATUS, RIGHT: ICD-10-CM

## 2021-03-26 DIAGNOSIS — M17.11 OSTEOARTHRITIS OF RIGHT KNEE, UNSPECIFIED OSTEOARTHRITIS TYPE: Primary | ICD-10-CM

## 2021-03-26 PROCEDURE — 97140 MANUAL THERAPY 1/> REGIONS: CPT

## 2021-03-26 PROCEDURE — 97110 THERAPEUTIC EXERCISES: CPT

## 2021-03-26 PROCEDURE — 97112 NEUROMUSCULAR REEDUCATION: CPT

## 2021-03-26 NOTE — PROGRESS NOTES
Daily Note     Today's date: 3/26/2021  Patient name: Nga Adler  : 1955  MRN: [id number]  Referring provider: Luis Raya MD  Dx:   Encounter Diagnosis     ICD-10-CM    1  Osteoarthritis of right knee, unspecified osteoarthritis type  M17 11    2  Total knee replacement status, right  Z96 651                   Subjective: Pt states he was able to go for a 2 5 mile walk and then did some yard work yesterday  Pt notes stiffness after this, but otherwise, he felt good  Objective: See treatment diary below      Assessment: Tolerated treatment well  Patient demonstrated fatigue post treatment, exhibited good technique with therapeutic exercises and would benefit from continued PT  Pt's diligence with his hep and stretching at home have helped in making progress towards his goals  Pt's mobility has improved a great deal and his flexion was measured at 120 deg passively today  Plan: Continue per plan of care        Precautions: s/p R TKR      Ther Ex 3/3 3/5 3/10 3/12 3/15 3/17 3/19 3/22 3/24 3/26   Bike 10min 10 min 10 min 10 min 10 min 10 min 10 min 10 min 10 min 10 min   Heel slides x30 x30 x30 x30 x30 x30 x30 x30 x30 x30   Table knee flexion x30 x30 x30 x30 x30 D/C       QS X 2 5"  x30 5"  x30 5"  x30 5"  x30 5"  x30 5"  x30 5"  x30 5"  x30 5"  x30 5"  x30   SLR X 4 2#  3x12 2#  3x12 2#  3x12 2#  3x15 3#  3x10 3#  3x10 3#  3x12 3#  3x12 3#  3x15 3#  3x15   Prone TKE 5"  7#  3x10 7#  3x10 7#  3x12 7#  3x15 10#  3x10 10#  3x12 10#  3x12 10#  3x15 10#  3x15 [place]  15#  3x10     HS str 3x30" 3x30" 3x30" 3x30" 3x30" 3x30" 3x30" 3x30" 3x30" 3x30"   Gastroc str 3x30" 3x30" 3x30" 3x30" 3x30" 3x30" 3x30" 3x30" 3x30" 3x30"   Prone knee flexion 2#  3x12 2#  3x12 2#  3x12 2#  3x15 3#  3x10 3#  3x10 3#  3x12 3#  3x12 3#  3x15 3#  3x15   HR's 3x15 3x15 3x15 3x15 3x15 3x15 3x15 3x15 3x15 3x15   Wall slides 3x10 3x10 3x12 3x15 3x15 3x15 3x15 3x15 5#  3x10 5#  3x15   SLS    15"x3 30"x3 30"x3 30"x3 30"x3 30"x3 30"x3   Step downs        L1  3x10   L2  3x10 L2  3x12                                                                                                                                                               K-tape to incision             R LE PROM             Modalities 3/3 3/5  3/12 3/15 3/17 3/19 3/22 3/24 3/26   MH 15'  W/prop  post 13'  W/prop  pre  13'  W/prop  post 13'  W/prop  post 13'  W/prop  post 13'  W/prop  post 15'  W/prop  post 15'  W/prop  post 15'  W/  Prop  post

## 2021-03-29 ENCOUNTER — OFFICE VISIT (OUTPATIENT)
Dept: PHYSICAL THERAPY | Facility: MEDICAL CENTER | Age: 66
End: 2021-03-29
Payer: MEDICARE

## 2021-03-29 DIAGNOSIS — Z96.651 TOTAL KNEE REPLACEMENT STATUS, RIGHT: ICD-10-CM

## 2021-03-29 DIAGNOSIS — M17.11 OSTEOARTHRITIS OF RIGHT KNEE, UNSPECIFIED OSTEOARTHRITIS TYPE: Primary | ICD-10-CM

## 2021-03-29 PROCEDURE — 97140 MANUAL THERAPY 1/> REGIONS: CPT

## 2021-03-29 PROCEDURE — 97112 NEUROMUSCULAR REEDUCATION: CPT

## 2021-03-29 PROCEDURE — 97110 THERAPEUTIC EXERCISES: CPT

## 2021-03-29 NOTE — PROGRESS NOTES
Daily Note     Today's date: 3/29/2021  Patient name: Conner Hickman  : 1955  MRN: [id number]  Referring provider: Doni Lovell MD  Dx:   Encounter Diagnosis     ICD-10-CM    1  Osteoarthritis of right knee, unspecified osteoarthritis type  M17 11    2  Total knee replacement status, right  Z96 651                   Subjective: Pt states that he received a good report from the Dr and is progressing well  Objective: See treatment diary below      Assessment: Tolerated treatment well  Patient demonstrated fatigue post treatment, exhibited good technique with therapeutic exercises and would benefit from continued PT  Pt continues to progress his endurance is also improving  Plan: Continue per plan of care        Precautions: s/p R TKR      Ther Ex 3/29            Bike 10min            Heel slides x30            Table knee flexion x30            QS X 2 5"  x30            SLR X 4 3#  3x15            TKE [place]  15#  3x10            HS str 3x30"            Gastroc str 3x30"            Prone knee flexion 3#  3x15            HR's 3x15            Wall slides 10#  3x10            SLS 30"x3            Step downs L2  3x12                                                                                                                                                                        K-tape to incision             R LE PROM KO            Modalities 3/29            [address] NP

## 2021-03-31 ENCOUNTER — OFFICE VISIT (OUTPATIENT)
Dept: PHYSICAL THERAPY | Facility: MEDICAL CENTER | Age: 66
End: 2021-03-31
Payer: MEDICARE

## 2021-03-31 DIAGNOSIS — M17.11 OSTEOARTHRITIS OF RIGHT KNEE, UNSPECIFIED OSTEOARTHRITIS TYPE: Primary | ICD-10-CM

## 2021-03-31 DIAGNOSIS — Z96.651 TOTAL KNEE REPLACEMENT STATUS, RIGHT: ICD-10-CM

## 2021-03-31 PROCEDURE — 97110 THERAPEUTIC EXERCISES: CPT

## 2021-03-31 PROCEDURE — 97140 MANUAL THERAPY 1/> REGIONS: CPT

## 2021-03-31 PROCEDURE — 97112 NEUROMUSCULAR REEDUCATION: CPT

## 2021-03-31 NOTE — PROGRESS NOTES
Daily Note     Today's date: 3/31/2021  Patient name: Luise Mcardle  : 1955  MRN: [id number]  Referring provider: Mary Leon MD  Dx:   Encounter Diagnosis     ICD-10-CM    1  Osteoarthritis of right knee, unspecified osteoarthritis type  M17 11    2  Total knee replacement status, right  Z96 651                   Subjective: Pt states that his knee is doing good and is walking longer distances and more frequently  Objective: See treatment diary below      Assessment: Tolerated treatment well  Patient demonstrated fatigue post treatment, exhibited good technique with therapeutic exercises and would benefit from continued PT  Pt continues to make steady progress in strength, endurance and mobility  Plan: Continue per plan of care  Progress as able       Precautions: s/p R TKR      Ther Ex 3/29 3/31           Bike 10min 10 min           Heel slides x30 x30           Table knee flexion x30 x30           QS X 2 5"  x30 5"  x30           SLR X 4 3#  3x15 4#  3x10           TKE [place]  15#  3x10 [place]  20#  3x15             HS str 3x30" 3x30"           Gastroc str 3x30" 3x30"           Prone knee flexion 3#  3x15 4#  3x10           HR's 3x15 3x15           Wall slides 10#  3x10 10#  3x15           SLS 30"x3 W/alpha  3x           Step downs L2  3x12 L2  3x15                                                                                                                                                                       K-tape to incision             R LE PROM KO KO           Modalities 3/29 3/31           MH NP 15'

## 2021-04-02 ENCOUNTER — OFFICE VISIT (OUTPATIENT)
Dept: PHYSICAL THERAPY | Facility: MEDICAL CENTER | Age: 66
End: 2021-04-02
Payer: MEDICARE

## 2021-04-02 DIAGNOSIS — M17.11 OSTEOARTHRITIS OF RIGHT KNEE, UNSPECIFIED OSTEOARTHRITIS TYPE: Primary | ICD-10-CM

## 2021-04-02 DIAGNOSIS — Z96.651 TOTAL KNEE REPLACEMENT STATUS, RIGHT: ICD-10-CM

## 2021-04-02 PROCEDURE — 97140 MANUAL THERAPY 1/> REGIONS: CPT

## 2021-04-02 PROCEDURE — 97112 NEUROMUSCULAR REEDUCATION: CPT

## 2021-04-02 PROCEDURE — 97110 THERAPEUTIC EXERCISES: CPT

## 2021-04-02 NOTE — PROGRESS NOTES
Daily Note     Today's date: 2021  Patient name: Vijay Slater  : 1955  MRN: [id number]  Referring provider: Kera Gaston MD  Dx:   Encounter Diagnosis     ICD-10-CM    1  Osteoarthritis of right knee, unspecified osteoarthritis type  M17 11    2  Total knee replacement status, right  Z96 651                   Subjective: Pt states that his knee is feeling good, but feels " it" when he works it more  Pt states that his knee feels heavy at times  Objective: See treatment diary below      Assessment: Tolerated treatment well  Patient demonstrated fatigue post treatment, exhibited good technique with therapeutic exercises and would benefit from continued PT  Pt's mobility cont to improve and is making good strength gains as well  Plan: Continue per plan of care        Precautions: s/p R TKR      Ther Ex 3/29 3/31 4/2          Bike 10min 10 min 10 min          Heel slides x30 x30 x30                       QS X 2 5"  x30 5"  x30 5"  x30          SLR X 4 3#  3x15 4#  3x10 4#  3x10          TKE [place]  15#  3x10 [place]  20#  3x15   [place]  20#  3x15          HS str 3x30" 3x30" 3x30          Gastroc str 3x30" 3x30" 3x30"          Prone knee flexion 3#  3x15 4#  3x10 Stand  4#  3x12          HR's 3x15 3x15 3x15          Wall slides 10#  3x10 10#  3x15 10#  3x15          SLS 30"x3 W/alpha  3x w/alpha  3x          Step downs L2  3x12 L2  3x15 L3  3x10          LP   110#  3x15                                                                                                                                                         K-tape to incision             R LE PROM KO KO KO          Modalities 3/29 3/31 4/2          MH NP 15' 15'

## 2021-04-05 ENCOUNTER — OFFICE VISIT (OUTPATIENT)
Dept: PHYSICAL THERAPY | Facility: MEDICAL CENTER | Age: 66
End: 2021-04-05
Payer: MEDICARE

## 2021-04-05 DIAGNOSIS — M17.11 OSTEOARTHRITIS OF RIGHT KNEE, UNSPECIFIED OSTEOARTHRITIS TYPE: Primary | ICD-10-CM

## 2021-04-05 DIAGNOSIS — Z96.651 TOTAL KNEE REPLACEMENT STATUS, RIGHT: ICD-10-CM

## 2021-04-05 PROCEDURE — 97140 MANUAL THERAPY 1/> REGIONS: CPT

## 2021-04-05 PROCEDURE — 97110 THERAPEUTIC EXERCISES: CPT

## 2021-04-05 PROCEDURE — 97112 NEUROMUSCULAR REEDUCATION: CPT

## 2021-04-05 NOTE — PROGRESS NOTES
Daily Note     Today's date: 2021  Patient name: Annamae Lanes  : 1955  MRN: [id number]  Referring provider: Jaime Mitchell MD  Dx:   Encounter Diagnosis     ICD-10-CM    1  Osteoarthritis of right knee, unspecified osteoarthritis type  M17 11    2  Total knee replacement status, right  Z96 651                   Subjective: Pt states that he went chipping and putting last week and notes increased stiffness in his knee  Objective: See treatment diary below      Assessment: Tolerated treatment well  Patient demonstrated fatigue post treatment, exhibited good technique with therapeutic exercises and would benefit from continued PT        Plan: Continue per plan of care        Precautions: s/p R TKR      Ther Ex 3/29 3/31 4/2 4/5         Bike 10min 10 min 10 min 10 min         Heel slides x30 x30 x30 x30                      QS X 2 5"  x30 5"  x30 5"  x30 5"  x30         SLR X 4 3#  3x15 4#  3x10 4#  3x10 4#  3x12         TKE [place]  15#  3x10 [place]  20#  3x15   [place]  20#  3x15 [place]  20#  3x15         HS str 3x30" 3x30" 3x30 3x30"         Gastroc str 3x30" 3x30" 3x30" 3x30"         Prone knee flexion 3#  3x15 4#  3x10 Stand  4#  3x12 4#  3x12         HR's 3x15 3x15 3x15 3x15         Wall slides 10#  3x10 10#  3x15 10#  3x15 10#  3x15         SLS 30"x3 W/alpha  3x w/alpha  3x w/alpha  3x         Step downs L2  3x12 L2  3x15 L3  3x10 L2  3x10         LP   110#  3x15 130#  3x10                                                                                                                                                        K-tape to incision             R LE PROM KO KO KO KO         Modalities 3/29 3/31 4/2 4/5          NP 15' 15' 15'

## 2021-04-08 ENCOUNTER — APPOINTMENT (OUTPATIENT)
Dept: PHYSICAL THERAPY | Facility: MEDICAL CENTER | Age: 66
End: 2021-04-08
Payer: MEDICARE

## 2021-04-09 ENCOUNTER — OFFICE VISIT (OUTPATIENT)
Dept: PHYSICAL THERAPY | Facility: MEDICAL CENTER | Age: 66
End: 2021-04-09
Payer: MEDICARE

## 2021-04-09 DIAGNOSIS — M17.11 OSTEOARTHRITIS OF RIGHT KNEE, UNSPECIFIED OSTEOARTHRITIS TYPE: Primary | ICD-10-CM

## 2021-04-09 DIAGNOSIS — Z96.651 TOTAL KNEE REPLACEMENT STATUS, RIGHT: ICD-10-CM

## 2021-04-09 PROCEDURE — 97112 NEUROMUSCULAR REEDUCATION: CPT

## 2021-04-09 PROCEDURE — 97110 THERAPEUTIC EXERCISES: CPT

## 2021-04-09 PROCEDURE — 97140 MANUAL THERAPY 1/> REGIONS: CPT

## 2021-04-09 NOTE — PROGRESS NOTES
Daily Note     Today's date: 2021  Patient name: Shahram Gonzales  : 1955  MRN: [id number]  Referring provider: Anne Harris MD  Dx:   Encounter Diagnosis     ICD-10-CM    1  Osteoarthritis of right knee, unspecified osteoarthritis type  M17 11    2  Total knee replacement status, right  Z96 651                   Subjective: Pt reports that after last PT session, he walked for 2 miles and then hit 120 golf balls  Pt denies pain during these activities, but states his knee was stiff afterwards  Objective: See treatment diary below      Assessment: Tolerated treatment well  Patient demonstrated fatigue post treatment, exhibited good technique with therapeutic exercises and would benefit from continued PT  Pt continues to progress and demonstrates improved LE strength  Pt has been performing his hep on a daily basis and displays improved mobility as a result  Plan: Continue per plan of care        Precautions: s/p R TKR      Ther Ex 3/29 3/31 4/2 4/5 4/9        Bike 10min 10 min 10 min 10 min 10 min        Heel slides x30 x30 x30 x30 x30                     QS X 2 5"  x30 5"  x30 5"  x30 5"  x30 5"  x30        SLR X 4 3#  3x15 4#  3x10 4#  3x10 4#  3x12 4#  3x15        TKE [place]  15#  3x10 [place]  20#  3x15   [place]  20#  3x15 [place]  20#  3x15 [place]  23#  3x10        HS str 3x30" 3x30" 3x30 3x30" 3x30"        Gastroc str 3x30" 3x30" 3x30" 3x30" 3x30"        Prone knee flexion 3#  3x15 4#  3x10 Stand  4#  3x12 4#  3x12 4#  3x15        HR's 3x15 3x15 3x15 3x15 3x15        Wall slides 10#  3x10 10#  3x15 10#  3x15 10#  3x15 15#  3x15        SLS 30"x3 W/alpha  3x w/alpha  3x w/alpha  3x W/  Alpha  3x        Step downs L2  3x12 L2  3x15 L3  3x10 L2  3x10 L2  3x15        LP   110#  3x15 130#  3x10 140#  3x15                                                                                                                                                       K-tape to incision     KO        R LE PROM KO KO KO KO KO        Modalities 3/29 3/31 4/2 4/5 4/9         NP 15' 15' 15' 15'

## 2021-04-13 ENCOUNTER — OFFICE VISIT (OUTPATIENT)
Dept: PHYSICAL THERAPY | Facility: MEDICAL CENTER | Age: 66
End: 2021-04-13
Payer: MEDICARE

## 2021-04-13 DIAGNOSIS — Z96.651 TOTAL KNEE REPLACEMENT STATUS, RIGHT: ICD-10-CM

## 2021-04-13 DIAGNOSIS — M17.11 OSTEOARTHRITIS OF RIGHT KNEE, UNSPECIFIED OSTEOARTHRITIS TYPE: Primary | ICD-10-CM

## 2021-04-13 PROCEDURE — 97140 MANUAL THERAPY 1/> REGIONS: CPT

## 2021-04-13 PROCEDURE — 97110 THERAPEUTIC EXERCISES: CPT

## 2021-04-13 PROCEDURE — 97112 NEUROMUSCULAR REEDUCATION: CPT

## 2021-04-13 NOTE — PROGRESS NOTES
Daily Note     Today's date: 2021  Patient name: Ta Shay  : 1955  MRN: [id number]  Referring provider: Yuri Bautista MD  Dx:   Encounter Diagnosis     ICD-10-CM    1  Osteoarthritis of right knee, unspecified osteoarthritis type  M17 11    2  Total knee replacement status, right  Z96 651                   Subjective: Pt reports that his knee feels more stiff after increased activity or when seated in one position for a longer period of time  Objective: See treatment diary below      Assessment: Tolerated treatment well  Patient demonstrated fatigue post treatment, exhibited good technique with therapeutic exercises and would benefit from continued PT  Pt is making steady progress   Pt's knee flexion was measured at 122 deg passively today  Plan: Continue per plan of care        Precautions: s/p R TKR      Ther Ex 3/29 3/31 4/2 4/5 4/9 4/13       Bike 10min 10 min 10 min 10 min 10 min 10 min       Heel slides x30 x30 x30 x30 x30 x30                    QS X 2 5"  x30 5"  x30 5"  x30 5"  x30 5"  x30 5"  x30       SLR X 4 3#  3x15 4#  3x10 4#  3x10 4#  3x12 4#  3x15 4#  3x15       TKE [place]  15#  3x10 [place]  20#  3x15   [place]  20#  3x15 [place]  20#  3x15 [place]  23#  3x10 [place]  20#  3x15       HS str 3x30" 3x30" 3x30 3x30" 3x30" 3x30"       Gastroc str 3x30" 3x30" 3x30" 3x30" 3x30" 3x30"       Prone knee flexion 3#  3x15 4#  3x10 Stand  4#  3x12 4#  3x12 4#  3x15 4#  3x15       HR's 3x15 3x15 3x15 3x15 3x15 3x15       Wall slides 10#  3x10 10#  3x15 10#  3x15 10#  3x15 15#  3x15 15#  3x15       SLS 30"x3 W/alpha  3x w/alpha  3x w/alpha  3x W/  Alpha  3x W/  Alpha  3x       Step downs L2  3x12 L2  3x15 L3  3x10 L2  3x10 L2  3x15 L3  3x12       LP   110#  3x15 130#  3x10 140#  3x15 150#  3x15                                                                                                                                                      K-tape to incision     KO np       R LE PROM KO KO KO KO KO KO       Modalities 3/29 3/31 4/2 4/5 4/9 4/13       MH NP 15' 15' 15' 15' 15'

## 2021-04-15 ENCOUNTER — OFFICE VISIT (OUTPATIENT)
Dept: PHYSICAL THERAPY | Facility: MEDICAL CENTER | Age: 66
End: 2021-04-15
Payer: MEDICARE

## 2021-04-15 DIAGNOSIS — M17.11 OSTEOARTHRITIS OF RIGHT KNEE, UNSPECIFIED OSTEOARTHRITIS TYPE: Primary | ICD-10-CM

## 2021-04-15 DIAGNOSIS — Z96.651 TOTAL KNEE REPLACEMENT STATUS, RIGHT: ICD-10-CM

## 2021-04-15 PROCEDURE — 97112 NEUROMUSCULAR REEDUCATION: CPT

## 2021-04-15 PROCEDURE — 97110 THERAPEUTIC EXERCISES: CPT

## 2021-04-15 PROCEDURE — 97140 MANUAL THERAPY 1/> REGIONS: CPT

## 2021-04-15 NOTE — PROGRESS NOTES
Daily Note     Today's date: 4/15/2021  Patient name: Maite Delcid  : 1955  MRN: [id number]  Referring provider: Berto Luther MD  Dx:   Encounter Diagnosis     ICD-10-CM    1  Osteoarthritis of right knee, unspecified osteoarthritis type  M17 11    2  Total knee replacement status, right  Z96 651                   Subjective: Pt states that he cut the lawn, then hit 120 golf balls after last PT session  Pt denies pain, but just experienced R knee stiffness the following day  Objective: See treatment diary below      Assessment: Tolerated treatment well  Patient demonstrated fatigue post treatment, exhibited good technique with therapeutic exercises and would benefit from continued PT  Pt is making steady progress with each visit  Pt's scar displays improved healing and decreased scar tissue build up with k-tape applications  Plan: Continue per plan of care        Precautions: s/p R TKR      Ther Ex 3/29 3/31 4/2 4/5 4/9 4/13 4/15      Bike 10min 10 min 10 min 10 min 10 min 10 min 10 min      Heel slides x30 x30 x30 x30 x30 x30 x30                   QS X 2 5"  x30 5"  x30 5"  x30 5"  x30 5"  x30 5"  x30 5"  x30      SLR X 4 3#  3x15 4#  3x10 4#  3x10 4#  3x12 4#  3x15 4#  3x15 4#  3x15      TKE [place]  15#  3x10 [place]  20#  3x15   [place]  20#  3x15 [place]  20#  3x15 [place]  23#  3x10 [place]  20#  3x15 [place]  23#  3x10      HS str 3x30" 3x30" 3x30 3x30" 3x30" 3x30" 3x30"      Gastroc str 3x30" 3x30" 3x30" 3x30" 3x30" 3x30" 3x30"      Prone knee flexion 3#  3x15 4#  3x10 Stand  4#  3x12 4#  3x12 4#  3x15 4#  3x15 4#  3x15      HR's 3x15 3x15 3x15 3x15 3x15 3x15 3x15      Wall slides 10#  3x10 10#  3x15 10#  3x15 10#  3x15 15#  3x15 15#  3x15 20#  3x10      SLS 30"x3 W/alpha  3x w/alpha  3x w/alpha  3x W/  Alpha  3x W/  Alpha  3x W/alpha  3x      Step downs L2  3x12 L2  3x15 L3  3x10 L2  3x10 L2  3x15 L3  3x12 L3  3x12      LP   110#  3x15 130#  3x10 140#  3x15 150#  3x15 150#  3x15 K-tape to incision     EDUARDO FORREST LE PROM [address] [place] [address] KO      Modalities 3/29 3/31 4/2 4/5 4/9 4/13 4/15       NP 15' 15' 15' 15' 15' 15'

## 2021-04-20 ENCOUNTER — OFFICE VISIT (OUTPATIENT)
Dept: PHYSICAL THERAPY | Facility: MEDICAL CENTER | Age: 66
End: 2021-04-20
Payer: MEDICARE

## 2021-04-20 DIAGNOSIS — M17.11 OSTEOARTHRITIS OF RIGHT KNEE, UNSPECIFIED OSTEOARTHRITIS TYPE: Primary | ICD-10-CM

## 2021-04-20 DIAGNOSIS — Z96.651 TOTAL KNEE REPLACEMENT STATUS, RIGHT: ICD-10-CM

## 2021-04-20 PROCEDURE — 97140 MANUAL THERAPY 1/> REGIONS: CPT

## 2021-04-20 PROCEDURE — 97112 NEUROMUSCULAR REEDUCATION: CPT

## 2021-04-20 PROCEDURE — 97110 THERAPEUTIC EXERCISES: CPT

## 2021-04-20 NOTE — PROGRESS NOTES
Daily Note     Today's date: 2021  Patient name: Alen Aase  : 1955  MRN: [id number]  Referring provider: Neil Dillon MD  Dx:   Encounter Diagnosis     ICD-10-CM    1  Osteoarthritis of right knee, unspecified osteoarthritis type  M17 11    2  Total knee replacement status, right  Z96 651                   Subjective: Pt reports that his knee is generally stiff after increasing his activities  Pt has played full games of golf and did yard work over the weekend without pain, just stiffness post        Objective: See treatment diary below      Assessment: Tolerated treatment well  Patient demonstrated fatigue post treatment, exhibited good technique with therapeutic exercises and would benefit from continued PT  Pt's flexion mobility is improving with each visit  Plan: Continue per plan of care        Precautions: s/p R TKR      Ther Ex 3/29 3/31 4/2 4/5 4/9 4/13 4/15 4/20     Bike 10min 10 min 10 min 10 min 10 min 10 min 10 min 10 min     Heel slides x30 x30 x30 x30 x30 x30 x30 x30                  QS X 2 5"  x30 5"  x30 5"  x30 5"  x30 5"  x30 5"  x30 5"  x30 5"  x30     SLR X 4 3#  3x15 4#  3x10 4#  3x10 4#  3x12 4#  3x15 4#  3x15 4#  3x15 4#  3x15     TKE [place]  15#  3x10 [place]  20#  3x15   [place]  20#  3x15 [place]  20#  3x15 [place]  23#  3x10 [place]  20#  3x15 [place]  23#  3x10 [place]  23#  3x10     HS str 3x30" 3x30" 3x30 3x30" 3x30" 3x30" 3x30" 3x30"     Gastroc str 3x30" 3x30" 3x30" 3x30" 3x30" 3x30" 3x30" 3x30"     Prone knee flexion 3#  3x15 4#  3x10 Stand  4#  3x12 4#  3x12 4#  3x15 4#  3x15 4#  3x15 4#  3x15     HR's 3x15 3x15 3x15 3x15 3x15 3x15 3x15 3x15     Wall slides 10#  3x10 10#  3x15 10#  3x15 10#  3x15 15#  3x15 15#  3x15 20#  3x10 20#  3x10     SLS 30"x3 W/alpha  3x w/alpha  3x w/alpha  3x W/  Alpha  3x W/  Alpha  3x W/alpha  3x w/alpha  3x     Step downs L2  3x12 L2  3x15 L3  3x10 L2  3x10 L2  3x15 L3  3x12 L3  3x12 L3  3x12     LP   110#  3x15 130#  3x10 140#  3x15 150#  3x15 150#  3x15 150#  3x15                                                                                                                                                    K-tape to incision     KO np KO np     R LE PROM [address]     Modalities 3/29 3/31 4/2 4/5 4/9 4/13 4/15 4/20      NP 15' 15' 15' 15' 15' 15' 15'

## 2021-04-22 ENCOUNTER — OFFICE VISIT (OUTPATIENT)
Dept: PHYSICAL THERAPY | Facility: MEDICAL CENTER | Age: 66
End: 2021-04-22
Payer: MEDICARE

## 2021-04-22 DIAGNOSIS — M17.11 OSTEOARTHRITIS OF RIGHT KNEE, UNSPECIFIED OSTEOARTHRITIS TYPE: Primary | ICD-10-CM

## 2021-04-22 DIAGNOSIS — Z96.651 TOTAL KNEE REPLACEMENT STATUS, RIGHT: ICD-10-CM

## 2021-04-22 PROCEDURE — 97112 NEUROMUSCULAR REEDUCATION: CPT

## 2021-04-22 PROCEDURE — 97110 THERAPEUTIC EXERCISES: CPT

## 2021-04-22 PROCEDURE — 97140 MANUAL THERAPY 1/> REGIONS: CPT

## 2021-04-22 NOTE — PROGRESS NOTES
Daily Note     Today's date: 2021  Patient name: Price Kemp  : 1955  MRN: [id number]  Referring provider: Rivka Vasquez MD  Dx:   Encounter Diagnosis     ICD-10-CM    1  Osteoarthritis of right knee, unspecified osteoarthritis type  M17 11    2  Total knee replacement status, right  Z96 651                   Subjective: Pt reports that after last PT session he moved a lot of firewood and did some gardening to which brought on more soreness post        Objective: See treatment diary below      Assessment: Tolerated treatment well  Patient demonstrated fatigue post treatment, exhibited good technique with therapeutic exercises and would benefit from continued PT   Pt is making good progress in PT and is also working hard on his mobility and home activities  Pt has a good understanding of increased soreness and swelling after increasing his activities at home  Plan: Continue per plan of care        Precautions: s/p R TKR      Ther Ex 3/29 3/31 4/2 4/5 4/9 4/13 4/15 4/20 4/22    Bike 10min 10 min 10 min 10 min 10 min 10 min 10 min 10 min 10 min    Heel slides x30 x30 x30 x30 x30 x30 x30 x30 x30                 QS X 2 5"  x30 5"  x30 5"  x30 5"  x30 5"  x30 5"  x30 5"  x30 5"  x30 5"  x30    SLR X 4 3#  3x15 4#  3x10 4#  3x10 4#  3x12 4#  3x15 4#  3x15 4#  3x15 4#  3x15 4#  3x15    TKE [place]  15#  3x10 [place]  20#  3x15   [place]  20#  3x15 [place]  20#  3x15 [place]  23#  3x10 [place]  20#  3x15 [place]  23#  3x10 [place]  23#  3x10 [place]  23#  3x15    HS str 3x30" 3x30" 3x30 3x30" 3x30" 3x30" 3x30" 3x30" 3x30"    Gastroc str 3x30" 3x30" 3x30" 3x30" 3x30" 3x30" 3x30" 3x30" 3x30"    Prone knee flexion 3#  3x15 4#  3x10 Stand  4#  3x12 4#  3x12 4#  3x15 4#  3x15 4#  3x15 4#  3x15 4#  3x15    HR's 3x15 3x15 3x15 3x15 3x15 3x15 3x15 3x15 3x15    Wall slides 10#  3x10 10#  3x15 10#  3x15 10#  3x15 15#  3x15 15#  3x15 20#  3x10 20#  3x10 20#  3x15    SLS 30"x3 W/alpha  3x w/alpha  3x w/alpha  3x W/  Alpha  3x W/  Alpha  3x W/alpha  3x w/alpha  3x W/alpha  3x    Step downs L2  3x12 L2  3x15 L3  3x10 L2  3x10 L2  3x15 L3  3x12 L3  3x12 L3  3x12 L3  3x15    LP   110#  3x15 130#  3x10 140#  3x15 150#  3x15 150#  3x15 150#  3x15 165#  3x15                                                                                                                                                   K-tape to incision     KO np KO np     R LE PROM [address]    Modalities 3/29 3/31 4/2 4/5 4/9 4/13 4/15 4/20 4/22     NP 15' 15' 15' 15' 15' 15' 15' 15'

## 2021-04-27 ENCOUNTER — OFFICE VISIT (OUTPATIENT)
Dept: PHYSICAL THERAPY | Facility: MEDICAL CENTER | Age: 66
End: 2021-04-27
Payer: MEDICARE

## 2021-04-27 DIAGNOSIS — M17.11 OSTEOARTHRITIS OF RIGHT KNEE, UNSPECIFIED OSTEOARTHRITIS TYPE: Primary | ICD-10-CM

## 2021-04-27 DIAGNOSIS — Z96.651 TOTAL KNEE REPLACEMENT STATUS, RIGHT: ICD-10-CM

## 2021-04-27 PROCEDURE — 97140 MANUAL THERAPY 1/> REGIONS: CPT

## 2021-04-27 PROCEDURE — 97110 THERAPEUTIC EXERCISES: CPT

## 2021-04-27 NOTE — PROGRESS NOTES
Daily Note     Today's date: 2021  Patient name: Maite Delcid  : 1955  MRN: [id number]  Referring provider: Berto Luther MD  Dx:   Encounter Diagnosis     ICD-10-CM    1  Osteoarthritis of right knee, unspecified osteoarthritis type  M17 11    2  Total knee replacement status, right  Z96 651                   Subjective: Pt reports that his L knee has been having more discomfort lately  Pt is able to mow the lawn, but is usually sore the following day  Objective: See treatment diary below      Assessment: Tolerated treatment well  Patient demonstrated fatigue post treatment, exhibited good technique with therapeutic exercises and would benefit from continued PT  Pt is making good progress towards his goals  Pt has been able to increase his activities at home with usual tightness and soreness post         Plan: Continue per plan of care        Precautions: s/p R TKR      Ther Ex 3/29 3/31 4/2 4/5 4/9 4/13 4/15 4/20 4/22 4/27   Bike 10min 10 min 10 min 10 min 10 min 10 min 10 min 10 min 10 min 10 min   Heel slides x30 x30 x30 x30 x30 x30 x30 x30 x30 x30                QS X 2 5"  x30 5"  x30 5"  x30 5"  x30 5"  x30 5"  x30 5"  x30 5"  x30 5"  x30 5"  x30   SLR X 4 3#  3x15 4#  3x10 4#  3x10 4#  3x12 4#  3x15 4#  3x15 4#  3x15 4#  3x15 4#  3x15 4#  3x15   TKE [place]  15#  3x10 [place]  20#  3x15   [place]  20#  3x15 [place]  20#  3x15 [place]  23#  3x10 [place]  20#  3x15 [place]  23#  3x10 [place]  23#  3x10 [place]  23#  3x15 [place]  23#  3x15   HS str 3x30" 3x30" 3x30 3x30" 3x30" 3x30" 3x30" 3x30" 3x30" 3x30"   Gastroc str 3x30" 3x30" 3x30" 3x30" 3x30" 3x30" 3x30" 3x30" 3x30" 3x30"   Prone knee flexion 3#  3x15 4#  3x10 Stand  4#  3x12 4#  3x12 4#  3x15 4#  3x15 4#  3x15 4#  3x15 4#  3x15 4#  3x15   HR's 3x15 3x15 3x15 3x15 3x15 3x15 3x15 3x15 3x15 3x15   Wall slides 10#  3x10 10#  3x15 10#  3x15 10#  3x15 15#  3x15 15#  3x15 20#  3x10 20#  3x10 20#  3x15 20#  3x15   SLS 30"x3 W/alpha  3x w/alpha  3x w/alpha  3x W/  Alpha  3x W/  Alpha  3x W/alpha  3x w/alpha  3x W/alpha  3x W/alpha  3x   Step downs L2  3x12 L2  3x15 L3  3x10 L2  3x10 L2  3x15 L3  3x12 L3  3x12 L3  3x12 L3  3x15 L3  3x15   LP   110#  3x15 130#  3x10 140#  3x15 150#  3x15 150#  3x15 150#  3x15 165#  3x15 165#  3x15                                                                                                                                                  K-tape to incision     KO np KO np     R  [address]   Modalities 3/29 3/31 4/2 4/5 4/9 4/13 4/15 4/20 4/22 NP   MH NP 15' 15' 15' 15' 15' 15' 15' 15'

## 2021-04-29 ENCOUNTER — OFFICE VISIT (OUTPATIENT)
Dept: PHYSICAL THERAPY | Facility: MEDICAL CENTER | Age: 66
End: 2021-04-29
Payer: MEDICARE

## 2021-04-29 DIAGNOSIS — Z96.651 TOTAL KNEE REPLACEMENT STATUS, RIGHT: ICD-10-CM

## 2021-04-29 DIAGNOSIS — M17.11 OSTEOARTHRITIS OF RIGHT KNEE, UNSPECIFIED OSTEOARTHRITIS TYPE: Primary | ICD-10-CM

## 2021-04-29 PROCEDURE — 97110 THERAPEUTIC EXERCISES: CPT

## 2021-04-29 PROCEDURE — 97140 MANUAL THERAPY 1/> REGIONS: CPT

## 2021-04-29 NOTE — PROGRESS NOTES
Daily Note     Today's date: 2021  Patient name: Gerardo Johnston  : 1955  MRN: [id number]  Referring provider: Cornelio Jiang MD  Dx:   Encounter Diagnosis     ICD-10-CM    1  Osteoarthritis of right knee, unspecified osteoarthritis type  M17 11    2  Total knee replacement status, right  Z96 651                   Subjective: Pt reports that his knee continues to improve and is able to golf and do activities around the house  Pt mainly has issues with increased edema and tightness after increased activities, but is manageable  Objective: See treatment diary below      Assessment: Tolerated treatment well  Patient demonstrated fatigue post treatment, exhibited good technique with therapeutic exercises and would benefit from continued PT  Pt has made great strides in strength and mobility and is making progress towards his goals  Plan: Continue per plan of care        Precautions: s/p R TKR      Ther Ex             Bike 10min            Heel slides x30                         QS X 2 5"  x30            SLR X 4 4#  3x15            TKE [place]  23#  3x15            HS str 3x30"            Gastroc str 3x30"            Prone knee flexion 4#  3x15            HR's 3x15            Wall slides 20#  3x15            SLS w/alpha  3x            Step downs L3  3x15            #  3x15                                                                                                                                                           K-tape to incision             R LE PROM KO            Modalities 3/29            [address] NP

## 2021-05-04 ENCOUNTER — OFFICE VISIT (OUTPATIENT)
Dept: PHYSICAL THERAPY | Facility: MEDICAL CENTER | Age: 66
End: 2021-05-04
Payer: MEDICARE

## 2021-05-04 DIAGNOSIS — M17.11 OSTEOARTHRITIS OF RIGHT KNEE, UNSPECIFIED OSTEOARTHRITIS TYPE: Primary | ICD-10-CM

## 2021-05-04 DIAGNOSIS — Z96.651 TOTAL KNEE REPLACEMENT STATUS, RIGHT: ICD-10-CM

## 2021-05-04 PROCEDURE — 97140 MANUAL THERAPY 1/> REGIONS: CPT

## 2021-05-04 PROCEDURE — 97110 THERAPEUTIC EXERCISES: CPT

## 2021-05-04 NOTE — PROGRESS NOTES
Daily Note     Today's date: 2021  Patient name: Daja Youssef  : 1955  MRN: [id number]  Referring provider: Omar Ceja MD  Dx:   Encounter Diagnosis     ICD-10-CM    1  Osteoarthritis of right knee, unspecified osteoarthritis type  M17 11    2  Total knee replacement status, right  Z96 651                   Subjective: Pt reports that his L knee is more bothersome than his R at present  Objective: See treatment diary below      Assessment: Tolerated treatment well  Patient demonstrated fatigue post treatment, exhibited good technique with therapeutic exercises and would benefit from continued PT      Plan: Continue per plan of care        Precautions: s/p R TKR      Ther Ex            Bike 10min 10 min           Heel slides x30 x30                        QS X 2 5"  x30 5"  x30           SLR X 4 4#  3x15 5#  3x10           TKE [place]  23#  3x15 [place]  25#  3x10           HS str 3x30" 3x30"           Gastroc str 3x30" 3x30"           Prone knee flexion 4#  3x15 5#  3x10           HR's 3x15 3x15           Wall slides 20#  3x15 20#  3x15           SLS w/alpha  3x W/  Alpha  3x           Step downs L3  3x15 L3  3x15           #  3x15 170#  3x15                                                                                                                                                          K-tape to incision  KO           R LE PROM KO KO           Modalities            [address] NP NP

## 2021-05-11 ENCOUNTER — OFFICE VISIT (OUTPATIENT)
Dept: PHYSICAL THERAPY | Facility: MEDICAL CENTER | Age: 66
End: 2021-05-11
Payer: MEDICARE

## 2021-05-11 DIAGNOSIS — M17.11 OSTEOARTHRITIS OF RIGHT KNEE, UNSPECIFIED OSTEOARTHRITIS TYPE: Primary | ICD-10-CM

## 2021-05-11 DIAGNOSIS — Z96.651 TOTAL KNEE REPLACEMENT STATUS, RIGHT: ICD-10-CM

## 2021-05-11 PROCEDURE — 97140 MANUAL THERAPY 1/> REGIONS: CPT

## 2021-05-11 PROCEDURE — 97110 THERAPEUTIC EXERCISES: CPT

## 2021-05-11 NOTE — PROGRESS NOTES
Daily Note     Today's date: 2021  Patient name: Jg Aguilar  : 1955  MRN: [id number]  Referring provider: Francesco Diaz MD  Dx:   Encounter Diagnosis     ICD-10-CM    1  Osteoarthritis of right knee, unspecified osteoarthritis type  M17 11    2  Total knee replacement status, right  Z96 651                   Subjective: Pt reports that his L knee is feeling worse than his R knee  Pt has been golfing and doing yard work, as well as walking on longer walks and notes improved strength in his R knee  Objective: See treatment diary below      Assessment: Tolerated treatment well  Patient demonstrated fatigue post treatment and exhibited good technique with therapeutic exercises   Pt has made good progress in R knee ROM and strength  Pt has also made good progress towards his goals in PT and is able to perform outside activities such as golf and yard work without pain, just increased stiffness post    Pt's PROM in R knee is 125 deg  Plan: Pt dc'd to hep after todays visit        Precautions: s/p R TKR      Ther Ex           Bike 10min 10 min 10 min          Heel slides x30 x30 x30                       QS X 2 5"  x30 5"  x30 5"  x30          SLR X 4 4#  3x15 5#  3x10 5#  3x10          TKE [place]  23#  3x15 [place]  25#  3x10 [place]  25#  3x10          HS str 3x30" 3x30" 3x30"          Gastroc str 3x30" 3x30" 3x30"          Prone knee flexion 4#  3x15 5#  3x10 5#  3x10          HR's 3x15 3x15 3x15          Wall slides 20#  3x15 20#  3x15 20#  3x15          SLS w/alpha  3x W/  Alpha  3x W/alpha  3x          Step downs L3  3x15 L3  3x15 L3  3x15          #  3x15 170#  3x15 170#  3x15                                                                                                                                                         K-tape to incision  KO KO          R LE PROM KO KO KO          Modalities           MH NP NP 15'

## 2021-11-10 ENCOUNTER — EVALUATION (OUTPATIENT)
Dept: PHYSICAL THERAPY | Facility: MEDICAL CENTER | Age: 66
End: 2021-11-10
Payer: MEDICARE

## 2021-11-10 DIAGNOSIS — M54.2 CHRONIC NECK PAIN: Primary | ICD-10-CM

## 2021-11-10 DIAGNOSIS — G89.29 CHRONIC NECK PAIN: Primary | ICD-10-CM

## 2021-11-10 PROCEDURE — 97140 MANUAL THERAPY 1/> REGIONS: CPT | Performed by: PHYSICAL THERAPIST

## 2021-11-10 PROCEDURE — 97162 PT EVAL MOD COMPLEX 30 MIN: CPT | Performed by: PHYSICAL THERAPIST

## 2021-11-12 ENCOUNTER — OFFICE VISIT (OUTPATIENT)
Dept: PHYSICAL THERAPY | Facility: MEDICAL CENTER | Age: 66
End: 2021-11-12
Payer: MEDICARE

## 2021-11-12 DIAGNOSIS — M54.2 CHRONIC NECK PAIN: Primary | ICD-10-CM

## 2021-11-12 DIAGNOSIS — G89.29 CHRONIC NECK PAIN: Primary | ICD-10-CM

## 2021-11-12 PROCEDURE — 97140 MANUAL THERAPY 1/> REGIONS: CPT | Performed by: PHYSICAL THERAPIST

## 2021-11-12 PROCEDURE — 97110 THERAPEUTIC EXERCISES: CPT | Performed by: PHYSICAL THERAPIST

## 2021-11-17 ENCOUNTER — OFFICE VISIT (OUTPATIENT)
Dept: PHYSICAL THERAPY | Facility: MEDICAL CENTER | Age: 66
End: 2021-11-17
Payer: MEDICARE

## 2021-11-17 DIAGNOSIS — G89.29 CHRONIC NECK PAIN: Primary | ICD-10-CM

## 2021-11-17 DIAGNOSIS — M54.2 CHRONIC NECK PAIN: Primary | ICD-10-CM

## 2021-11-17 PROCEDURE — 97110 THERAPEUTIC EXERCISES: CPT | Performed by: PHYSICAL THERAPIST

## 2021-11-17 PROCEDURE — 97140 MANUAL THERAPY 1/> REGIONS: CPT | Performed by: PHYSICAL THERAPIST

## 2021-11-19 ENCOUNTER — OFFICE VISIT (OUTPATIENT)
Dept: PHYSICAL THERAPY | Facility: MEDICAL CENTER | Age: 66
End: 2021-11-19
Payer: MEDICARE

## 2021-11-19 DIAGNOSIS — M54.2 CHRONIC NECK PAIN: Primary | ICD-10-CM

## 2021-11-19 DIAGNOSIS — G89.29 CHRONIC NECK PAIN: Primary | ICD-10-CM

## 2021-11-19 PROCEDURE — 97110 THERAPEUTIC EXERCISES: CPT | Performed by: PHYSICAL THERAPIST

## 2021-11-19 PROCEDURE — 97140 MANUAL THERAPY 1/> REGIONS: CPT | Performed by: PHYSICAL THERAPIST

## 2021-11-23 ENCOUNTER — OFFICE VISIT (OUTPATIENT)
Dept: PHYSICAL THERAPY | Facility: MEDICAL CENTER | Age: 66
End: 2021-11-23
Payer: MEDICARE

## 2021-11-23 DIAGNOSIS — G89.29 CHRONIC NECK PAIN: Primary | ICD-10-CM

## 2021-11-23 DIAGNOSIS — M54.2 CHRONIC NECK PAIN: Primary | ICD-10-CM

## 2021-11-23 PROCEDURE — 97140 MANUAL THERAPY 1/> REGIONS: CPT | Performed by: PHYSICAL THERAPIST

## 2021-11-23 PROCEDURE — 97110 THERAPEUTIC EXERCISES: CPT | Performed by: PHYSICAL THERAPIST

## 2021-11-26 ENCOUNTER — OFFICE VISIT (OUTPATIENT)
Dept: PHYSICAL THERAPY | Facility: MEDICAL CENTER | Age: 66
End: 2021-11-26
Payer: MEDICARE

## 2021-11-26 DIAGNOSIS — M54.2 CHRONIC NECK PAIN: Primary | ICD-10-CM

## 2021-11-26 DIAGNOSIS — G89.29 CHRONIC NECK PAIN: Primary | ICD-10-CM

## 2021-11-26 PROCEDURE — 97140 MANUAL THERAPY 1/> REGIONS: CPT | Performed by: PHYSICAL THERAPIST

## 2021-11-26 PROCEDURE — 97110 THERAPEUTIC EXERCISES: CPT | Performed by: PHYSICAL THERAPIST

## 2021-11-30 ENCOUNTER — OFFICE VISIT (OUTPATIENT)
Dept: PHYSICAL THERAPY | Facility: MEDICAL CENTER | Age: 66
End: 2021-11-30
Payer: MEDICARE

## 2021-11-30 DIAGNOSIS — M54.2 CHRONIC NECK PAIN: Primary | ICD-10-CM

## 2021-11-30 DIAGNOSIS — G89.29 CHRONIC NECK PAIN: Primary | ICD-10-CM

## 2021-11-30 PROCEDURE — 97110 THERAPEUTIC EXERCISES: CPT | Performed by: PHYSICAL THERAPIST

## 2021-11-30 PROCEDURE — 97140 MANUAL THERAPY 1/> REGIONS: CPT | Performed by: PHYSICAL THERAPIST

## 2021-11-30 PROCEDURE — 97010 HOT OR COLD PACKS THERAPY: CPT | Performed by: PHYSICAL THERAPIST

## 2021-12-02 ENCOUNTER — APPOINTMENT (OUTPATIENT)
Dept: PHYSICAL THERAPY | Facility: MEDICAL CENTER | Age: 66
End: 2021-12-02
Payer: MEDICARE

## 2021-12-07 ENCOUNTER — OFFICE VISIT (OUTPATIENT)
Dept: PHYSICAL THERAPY | Facility: MEDICAL CENTER | Age: 66
End: 2021-12-07
Payer: MEDICARE

## 2021-12-07 DIAGNOSIS — G89.29 CHRONIC NECK PAIN: Primary | ICD-10-CM

## 2021-12-07 DIAGNOSIS — M54.2 CHRONIC NECK PAIN: Primary | ICD-10-CM

## 2021-12-07 PROCEDURE — 97140 MANUAL THERAPY 1/> REGIONS: CPT | Performed by: PHYSICAL THERAPIST

## 2021-12-07 PROCEDURE — 97110 THERAPEUTIC EXERCISES: CPT | Performed by: PHYSICAL THERAPIST

## 2021-12-10 ENCOUNTER — OFFICE VISIT (OUTPATIENT)
Dept: PHYSICAL THERAPY | Facility: MEDICAL CENTER | Age: 66
End: 2021-12-10
Payer: MEDICARE

## 2021-12-10 DIAGNOSIS — M54.2 CHRONIC NECK PAIN: Primary | ICD-10-CM

## 2021-12-10 DIAGNOSIS — G89.29 CHRONIC NECK PAIN: Primary | ICD-10-CM

## 2021-12-10 PROCEDURE — 97140 MANUAL THERAPY 1/> REGIONS: CPT | Performed by: PHYSICAL THERAPIST

## 2021-12-10 PROCEDURE — 97110 THERAPEUTIC EXERCISES: CPT | Performed by: PHYSICAL THERAPIST

## 2021-12-14 ENCOUNTER — OFFICE VISIT (OUTPATIENT)
Dept: PHYSICAL THERAPY | Facility: MEDICAL CENTER | Age: 66
End: 2021-12-14
Payer: MEDICARE

## 2021-12-14 DIAGNOSIS — M54.2 CHRONIC NECK PAIN: Primary | ICD-10-CM

## 2021-12-14 DIAGNOSIS — G89.29 CHRONIC NECK PAIN: Primary | ICD-10-CM

## 2021-12-14 PROCEDURE — 97140 MANUAL THERAPY 1/> REGIONS: CPT | Performed by: PHYSICAL THERAPIST

## 2021-12-14 PROCEDURE — 97110 THERAPEUTIC EXERCISES: CPT | Performed by: PHYSICAL THERAPIST

## 2021-12-14 PROCEDURE — 97010 HOT OR COLD PACKS THERAPY: CPT | Performed by: PHYSICAL THERAPIST

## 2021-12-16 ENCOUNTER — OFFICE VISIT (OUTPATIENT)
Dept: PHYSICAL THERAPY | Facility: MEDICAL CENTER | Age: 66
End: 2021-12-16
Payer: MEDICARE

## 2021-12-16 DIAGNOSIS — G89.29 CHRONIC NECK PAIN: Primary | ICD-10-CM

## 2021-12-16 DIAGNOSIS — M54.2 CHRONIC NECK PAIN: Primary | ICD-10-CM

## 2021-12-16 PROCEDURE — 97110 THERAPEUTIC EXERCISES: CPT | Performed by: PHYSICAL THERAPIST

## 2021-12-16 PROCEDURE — 97140 MANUAL THERAPY 1/> REGIONS: CPT | Performed by: PHYSICAL THERAPIST

## 2021-12-21 ENCOUNTER — OFFICE VISIT (OUTPATIENT)
Dept: PHYSICAL THERAPY | Facility: MEDICAL CENTER | Age: 66
End: 2021-12-21
Payer: MEDICARE

## 2021-12-21 DIAGNOSIS — G89.29 CHRONIC NECK PAIN: Primary | ICD-10-CM

## 2021-12-21 DIAGNOSIS — M54.2 CHRONIC NECK PAIN: Primary | ICD-10-CM

## 2021-12-21 PROCEDURE — 97110 THERAPEUTIC EXERCISES: CPT | Performed by: PHYSICAL THERAPIST

## 2021-12-21 PROCEDURE — 97010 HOT OR COLD PACKS THERAPY: CPT | Performed by: PHYSICAL THERAPIST

## 2021-12-21 PROCEDURE — 97140 MANUAL THERAPY 1/> REGIONS: CPT | Performed by: PHYSICAL THERAPIST

## 2021-12-23 ENCOUNTER — OFFICE VISIT (OUTPATIENT)
Dept: PHYSICAL THERAPY | Facility: MEDICAL CENTER | Age: 66
End: 2021-12-23
Payer: MEDICARE

## 2021-12-23 DIAGNOSIS — M54.2 CHRONIC NECK PAIN: Primary | ICD-10-CM

## 2021-12-23 DIAGNOSIS — G89.29 CHRONIC NECK PAIN: Primary | ICD-10-CM

## 2021-12-23 PROCEDURE — 97110 THERAPEUTIC EXERCISES: CPT | Performed by: PHYSICAL THERAPIST

## 2021-12-23 PROCEDURE — 97140 MANUAL THERAPY 1/> REGIONS: CPT | Performed by: PHYSICAL THERAPIST

## 2021-12-23 PROCEDURE — 97010 HOT OR COLD PACKS THERAPY: CPT | Performed by: PHYSICAL THERAPIST

## 2022-01-03 ENCOUNTER — OFFICE VISIT (OUTPATIENT)
Dept: PHYSICAL THERAPY | Facility: MEDICAL CENTER | Age: 67
End: 2022-01-03
Payer: MEDICARE

## 2022-01-03 DIAGNOSIS — M54.2 CHRONIC NECK PAIN: Primary | ICD-10-CM

## 2022-01-03 DIAGNOSIS — G89.29 CHRONIC NECK PAIN: Primary | ICD-10-CM

## 2022-01-03 PROCEDURE — 97140 MANUAL THERAPY 1/> REGIONS: CPT | Performed by: PHYSICAL THERAPIST

## 2022-01-03 PROCEDURE — 97110 THERAPEUTIC EXERCISES: CPT | Performed by: PHYSICAL THERAPIST

## 2022-01-03 NOTE — PROGRESS NOTES
Daily Note     Today's date: 1/3/2022  Patient name: Alyson Seay  : 1955  MRN: [id number]  Referring provider: Lizeth Whitt DO  Dx:   Encounter Diagnosis     ICD-10-CM    1  Chronic neck pain  M54 2     G89 29        Subjective: patient states that he is feeling better and having less issues with rotational motion  Waking pain at times  Objective: See treatment diary below      Assessment: Tolerated treatment well  Patient demonstrated fatigue post treatment, exhibited good technique with therapeutic exercises and would benefit from continued PT  Continues to make good gains with ROM and functional mobility with manual therapy and stretching program        Plan: Continue per plan of care  Precautions: none      Manuals             C3-7 upa right  Gr  Iv- 10sec x5            1st rib mob Gr  v            left cervical lateral glide Gr   Iv 10sec x5                                                                                                                                 Ther Ex             Cervical snag 10x2            Thoracic extension over 1/2 roller [id number]            Foam rolling 10min            Hip hinge with elbows on table 10sec x5            MWM right first rib 10x2                                                                                                                                 Modalities             Heat supine 10min

## 2022-01-06 ENCOUNTER — OFFICE VISIT (OUTPATIENT)
Dept: PHYSICAL THERAPY | Facility: MEDICAL CENTER | Age: 67
End: 2022-01-06
Payer: MEDICARE

## 2022-01-06 DIAGNOSIS — M54.2 CHRONIC NECK PAIN: Primary | ICD-10-CM

## 2022-01-06 DIAGNOSIS — G89.29 CHRONIC NECK PAIN: Primary | ICD-10-CM

## 2022-01-06 PROCEDURE — 97140 MANUAL THERAPY 1/> REGIONS: CPT | Performed by: PHYSICAL THERAPIST

## 2022-01-06 PROCEDURE — 97110 THERAPEUTIC EXERCISES: CPT | Performed by: PHYSICAL THERAPIST

## 2022-01-06 NOTE — PROGRESS NOTES
Daily Note     Today's date: 2022  Patient name: Gerardo Johnston  : 1955  MRN: [id number]  Referring provider: Lavon Mata DO  Dx:   Encounter Diagnosis     ICD-10-CM    1  Chronic neck pain  M54 2     G89 29        Subjective: patient states that he is feeling better and having less issues with rotational motion  Patient notes that he has been able to drive and see his blind spots easier  Objective: See treatment diary below      Assessment: Tolerated treatment well  Patient demonstrated fatigue post treatment, exhibited good technique with therapeutic exercises and would benefit from continued PT  Continues to make good gains with ROM and functional mobility with manual therapy and stretching program  Limitations with right side of neck decrease motion in all planes  Plan: Continue per plan of care  Precautions: none      Manuals             C3-7 upa right  Gr  Iv- 10sec x5            1st rib mob Gr  v            left cervical lateral glide Gr   Iv 10sec x5                                                                                                                                 Ther Ex             Cervical snag 10x2            Thoracic extension over 1/2 roller [id number]            Foam rolling 10min            Hip hinge with elbows on table 10sec x5            MWM right first rib 10x2                                                                                                                                 Modalities             Heat supine 10min

## 2022-01-11 ENCOUNTER — OFFICE VISIT (OUTPATIENT)
Dept: PHYSICAL THERAPY | Facility: MEDICAL CENTER | Age: 67
End: 2022-01-11
Payer: MEDICARE

## 2022-01-11 DIAGNOSIS — M54.2 CHRONIC NECK PAIN: Primary | ICD-10-CM

## 2022-01-11 DIAGNOSIS — G89.29 CHRONIC NECK PAIN: Primary | ICD-10-CM

## 2022-01-11 PROCEDURE — 97140 MANUAL THERAPY 1/> REGIONS: CPT | Performed by: PHYSICAL THERAPIST

## 2022-01-11 PROCEDURE — 97110 THERAPEUTIC EXERCISES: CPT | Performed by: PHYSICAL THERAPIST

## 2022-01-11 NOTE — PROGRESS NOTES
Daily Note     Today's date: 2022  Patient name: Nirav Garcia  : 1955  MRN: [id number]  Referring provider: Samia Ahn DO  Dx:   Encounter Diagnosis     ICD-10-CM    1  Chronic neck pain  M54 2     G89 29        Subjective: patient states that he is feeling better and having less issues with rotational motion  Patient notes that he has been able to drive and see his blind spots easier  Objective: See treatment diary below      Assessment: Tolerated treatment well  Patient demonstrated fatigue post treatment, exhibited good technique with therapeutic exercises and would benefit from continued PT  Continues to make good gains with ROM and functional mobility with manual therapy and stretching program  Limitations with right side of neck decrease motion in all planes  Plan: Continue per plan of care  Precautions: none      Manuals             C3-7 upa right  Gr  Iv- 10sec x5            1st rib mob Gr  v            left cervical lateral glide Gr   Iv 10sec x5                                                                                                                                 Ther Ex             Cervical snag 10x2            Thoracic extension over 1/2 roller [id number]            Foam rolling 10min            Hip hinge with elbows on table 10sec x5            MWM right first rib 10x2                                                                                                                                 Modalities             Heat supine 10min

## 2022-01-14 ENCOUNTER — OFFICE VISIT (OUTPATIENT)
Dept: PHYSICAL THERAPY | Facility: MEDICAL CENTER | Age: 67
End: 2022-01-14
Payer: MEDICARE

## 2022-01-14 DIAGNOSIS — M54.2 CHRONIC NECK PAIN: Primary | ICD-10-CM

## 2022-01-14 DIAGNOSIS — G89.29 CHRONIC NECK PAIN: Primary | ICD-10-CM

## 2022-01-14 PROCEDURE — 97110 THERAPEUTIC EXERCISES: CPT | Performed by: PHYSICAL THERAPIST

## 2022-01-14 PROCEDURE — 97140 MANUAL THERAPY 1/> REGIONS: CPT | Performed by: PHYSICAL THERAPIST

## 2022-01-14 NOTE — PROGRESS NOTES
Daily Note     Today's date: 2022  Patient name: Slade Gan  : 1955  MRN: [id number]  Referring provider: Suraj Leo DO  Dx:   Encounter Diagnosis     ICD-10-CM    1  Chronic neck pain  M54 2     G89 29        Subjective: patient states that he is feeling better  Neck stiffness increased from golf swings yesterday  Objective: See treatment diary below      Assessment: Tolerated treatment well  Patient demonstrated fatigue post treatment, exhibited good technique with therapeutic exercises and would benefit from continued PT  Some increased muscle soreness  Plan: Continue per plan of care  Precautions: none      Manuals             C3-7 upa right  Gr  Iv- 10sec x5            1st rib mob Gr  v            left cervical lateral glide Gr   Iv 10sec x5                                                                                                                                 Ther Ex             Cervical snag 10x2            Thoracic extension over 1/2 roller [id number]            Foam rolling 10min            Hip hinge with elbows on table 10sec x5            MWM right first rib 10x2                                                                                                                                 Modalities             Heat supine 10min

## 2022-01-18 ENCOUNTER — OFFICE VISIT (OUTPATIENT)
Dept: PHYSICAL THERAPY | Facility: MEDICAL CENTER | Age: 67
End: 2022-01-18
Payer: MEDICARE

## 2022-01-18 DIAGNOSIS — G89.29 CHRONIC NECK PAIN: Primary | ICD-10-CM

## 2022-01-18 DIAGNOSIS — M54.2 CHRONIC NECK PAIN: Primary | ICD-10-CM

## 2022-01-18 PROCEDURE — 97140 MANUAL THERAPY 1/> REGIONS: CPT | Performed by: PHYSICAL THERAPIST

## 2022-01-18 PROCEDURE — 97110 THERAPEUTIC EXERCISES: CPT | Performed by: PHYSICAL THERAPIST

## 2022-01-18 NOTE — PROGRESS NOTES
Daily Note     Today's date: 2022  Patient name: Joyce Cornejo  : 1955  MRN: [id number]  Referring provider: Shabbir Obrien DO  Dx:   Encounter Diagnosis     ICD-10-CM    1  Chronic neck pain  M54 2     G89 29        Subjective: patient states that he is feeling some increased right shoulder soreness with right rotation  Objective: See treatment diary below      Assessment: Tolerated treatment well  Patient demonstrated fatigue post treatment, exhibited good technique with therapeutic exercises and would benefit from continued PT  Some increased muscle soreness  Plan: Continue per plan of care  Precautions: none      Manuals             C3-7 upa right  Gr  Iv- 10sec x5            1st rib mob Gr  v            left cervical lateral glide Gr   Iv 10sec x5                                                                                                                                 Ther Ex             Cervical snag 10x2            Thoracic extension over 1/2 roller [id number]            Foam rolling 10min            Hip hinge with elbows on table 10sec x5            MWM right first rib 10x2                                                                                                                                 Modalities             Heat supine 10min

## 2022-01-21 ENCOUNTER — OFFICE VISIT (OUTPATIENT)
Dept: PHYSICAL THERAPY | Facility: MEDICAL CENTER | Age: 67
End: 2022-01-21
Payer: MEDICARE

## 2022-01-21 DIAGNOSIS — M54.2 CHRONIC NECK PAIN: Primary | ICD-10-CM

## 2022-01-21 DIAGNOSIS — G89.29 CHRONIC NECK PAIN: Primary | ICD-10-CM

## 2022-01-21 PROCEDURE — 97110 THERAPEUTIC EXERCISES: CPT | Performed by: PHYSICAL THERAPIST

## 2022-01-21 NOTE — PROGRESS NOTES
Daily Note     Today's date: 2022  Patient name: Daja Youssef  : 1955  MRN: [id number]  Referring provider: Constantin Schumacher DO  Dx:   Encounter Diagnosis     ICD-10-CM    1  Chronic neck pain  M54 2     G89 29        Subjective: patient states that he is feeling better  Objective: See treatment diary below      Assessment: Tolerated treatment well  Patient demonstrated fatigue post treatment, exhibited good technique with therapeutic exercises and would benefit from continued PT  Some increased muscle soreness  Plan: Continue per plan of care  Precautions: none      Manuals             C3-7 upa right  Gr  Iv- 10sec x5            1st rib mob Gr  v            left cervical lateral glide Gr   Iv 10sec x5                                                                                                                                 Ther Ex             Cervical snag 10x2            Thoracic extension over 1/2 roller 28yrym7            Foam rolling 10min            Hip hinge with elbows on table 10sec x5            MWM right first rib 10x2                                                                                                                                 Modalities             Heat supine 10min

## 2022-01-25 ENCOUNTER — OFFICE VISIT (OUTPATIENT)
Dept: PHYSICAL THERAPY | Facility: MEDICAL CENTER | Age: 67
End: 2022-01-25
Payer: MEDICARE

## 2022-01-25 DIAGNOSIS — M54.2 CHRONIC NECK PAIN: Primary | ICD-10-CM

## 2022-01-25 DIAGNOSIS — G89.29 CHRONIC NECK PAIN: Primary | ICD-10-CM

## 2022-01-25 PROCEDURE — 97110 THERAPEUTIC EXERCISES: CPT | Performed by: PHYSICAL THERAPIST

## 2022-01-25 PROCEDURE — 97140 MANUAL THERAPY 1/> REGIONS: CPT | Performed by: PHYSICAL THERAPIST

## 2022-01-25 NOTE — PROGRESS NOTES
Daily Note     Today's date: 2022  Patient name: Naamn Asencio  : 1955  MRN: [id number]  Referring provider: Price Loredo DO  Dx:   Encounter Diagnosis     ICD-10-CM    1  Chronic neck pain  M54 2     G89 29        Subjective: patient states that he is feeling better  Objective: See treatment diary below      Assessment: Tolerated treatment well  Patient demonstrated fatigue post treatment, exhibited good technique with therapeutic exercises and would benefit from continued PT  Some increased muscle soreness  Plan: Continue per plan of care  Precautions: none      Manuals             C3-7 upa right  Gr  Iv- 10sec x5            1st rib mob Gr  v            left cervical lateral glide Gr   Iv 10sec x5                                                                                                                                 Ther Ex             Cervical snag 10x2            Thoracic extension over 1/2 roller [id number]            Foam rolling 10min            Hip hinge with elbows on table 10sec x5            MWM right first rib 10x2                                                                                                                                 Modalities             Heat supine 10min

## 2022-01-28 ENCOUNTER — OFFICE VISIT (OUTPATIENT)
Dept: PHYSICAL THERAPY | Facility: MEDICAL CENTER | Age: 67
End: 2022-01-28
Payer: MEDICARE

## 2022-01-28 DIAGNOSIS — M54.2 CHRONIC NECK PAIN: Primary | ICD-10-CM

## 2022-01-28 DIAGNOSIS — G89.29 CHRONIC NECK PAIN: Primary | ICD-10-CM

## 2022-01-28 PROCEDURE — 97140 MANUAL THERAPY 1/> REGIONS: CPT | Performed by: PHYSICAL THERAPIST

## 2022-01-28 PROCEDURE — 97110 THERAPEUTIC EXERCISES: CPT | Performed by: PHYSICAL THERAPIST

## 2022-01-28 NOTE — PROGRESS NOTES
Daily Note     Today's date: 2022  Patient name: Jg Agiular  : 1955  MRN: [id number]  Referring provider: Nitza Dockery DO  Dx:   Encounter Diagnosis     ICD-10-CM    1  Chronic neck pain  M54 2     G89 29        Subjective: patient states that he is feeling better  Objective: See treatment diary below      Assessment: Tolerated treatment well  Patient demonstrated fatigue post treatment, exhibited good technique with therapeutic exercises and would benefit from continued PT  Some increased muscle soreness  Plan: Continue per plan of care  Precautions: none      Manuals             C3-7 upa right  Gr  Iv- 10sec x5            1st rib mob Gr  v            left cervical lateral glide Gr   Iv 10sec x5                                                                                                                                 Ther Ex             Cervical snag 10x2            Thoracic extension over 1/2 roller [id number]            Foam rolling 10min            Hip hinge with elbows on table 10sec x5            MWM right first rib 10x2                                                                                                                                 Modalities             Heat supine 10min

## 2022-02-01 ENCOUNTER — OFFICE VISIT (OUTPATIENT)
Dept: PHYSICAL THERAPY | Facility: MEDICAL CENTER | Age: 67
End: 2022-02-01
Payer: MEDICARE

## 2022-02-01 DIAGNOSIS — G89.29 CHRONIC NECK PAIN: Primary | ICD-10-CM

## 2022-02-01 DIAGNOSIS — M54.2 CHRONIC NECK PAIN: Primary | ICD-10-CM

## 2022-02-01 PROCEDURE — 97140 MANUAL THERAPY 1/> REGIONS: CPT | Performed by: PHYSICAL THERAPIST

## 2022-02-01 PROCEDURE — 97110 THERAPEUTIC EXERCISES: CPT | Performed by: PHYSICAL THERAPIST

## 2022-02-01 NOTE — PROGRESS NOTES
Daily Note     Today's date: 2022  Patient name: Joss Liriano  : 1955  MRN: [id number]  Referring provider: Fareed Solomon DO  Dx:   Encounter Diagnosis     ICD-10-CM    1  Chronic neck pain  M54 2     G89 29        Subjective: patient states that he is feeling better  Objective: See treatment diary below      Assessment: Tolerated treatment well  Patient demonstrated fatigue post treatment, exhibited good technique with therapeutic exercises and would benefit from continued PT  Some increased muscle soreness  Plan: Continue per plan of care  Precautions: none      Manuals             C3-7 upa right  Gr  Iv- 10sec x5            1st rib mob Gr  v            left cervical lateral glide Gr   Iv 10sec x5            Mid cervical right rotational snags 10x4                                                                                                                    Ther Ex             Cervical snag 10x2            Thoracic extension over 1/2 roller 03hhnl2            Foam rolling 10min            Hip hinge with elbows on table 10sec x5            MWM right first rib 10x2                                                                                                                                 Modalities             Heat supine 10min

## 2022-02-03 ENCOUNTER — OFFICE VISIT (OUTPATIENT)
Dept: PHYSICAL THERAPY | Facility: MEDICAL CENTER | Age: 67
End: 2022-02-03
Payer: MEDICARE

## 2022-02-03 DIAGNOSIS — G89.29 CHRONIC NECK PAIN: Primary | ICD-10-CM

## 2022-02-03 DIAGNOSIS — M54.2 CHRONIC NECK PAIN: Primary | ICD-10-CM

## 2022-02-03 PROCEDURE — 97110 THERAPEUTIC EXERCISES: CPT | Performed by: PHYSICAL THERAPIST

## 2022-02-03 PROCEDURE — 97140 MANUAL THERAPY 1/> REGIONS: CPT | Performed by: PHYSICAL THERAPIST

## 2022-02-03 NOTE — PROGRESS NOTES
Daily Note     Today's date: 2/3/2022  Patient name: Meryle Nestle  : 1955  MRN: [id number]  Referring provider: Michel Everett DO  Dx:   Encounter Diagnosis     ICD-10-CM    1  Chronic neck pain  M54 2     G89 29        Subjective: patient states that he is feeling better  More neck motion with less pain    Objective: See treatment diary below      Assessment: Tolerated treatment well  Patient demonstrated fatigue post treatment, exhibited good technique with therapeutic exercises and would benefit from continued PT  Some increased muscle soreness  Plan: Continue per plan of care  Precautions: none      Manuals             C3-7 upa right  Gr  Iv- 10sec x5            1st rib mob Gr  v            left cervical lateral glide Gr   Iv 10sec x5            Mid cervical right rotational snags 10x4                                                                                                                    Ther Ex             Cervical snag 10x2            Thoracic extension over 1/2 roller [id number]            Foam rolling 10min            Hip hinge with elbows on table 10sec x5            MWM right first rib 10x2                                                                                                                                 Modalities             Heat supine 10min

## 2022-02-07 ENCOUNTER — OFFICE VISIT (OUTPATIENT)
Dept: PHYSICAL THERAPY | Facility: MEDICAL CENTER | Age: 67
End: 2022-02-07
Payer: MEDICARE

## 2022-02-07 DIAGNOSIS — G89.29 CHRONIC NECK PAIN: Primary | ICD-10-CM

## 2022-02-07 DIAGNOSIS — M54.2 CHRONIC NECK PAIN: Primary | ICD-10-CM

## 2022-02-07 PROCEDURE — 97110 THERAPEUTIC EXERCISES: CPT | Performed by: PHYSICAL THERAPIST

## 2022-02-07 PROCEDURE — 97010 HOT OR COLD PACKS THERAPY: CPT | Performed by: PHYSICAL THERAPIST

## 2022-02-07 PROCEDURE — 97140 MANUAL THERAPY 1/> REGIONS: CPT | Performed by: PHYSICAL THERAPIST

## 2022-02-07 NOTE — PROGRESS NOTES
Daily Note     Today's date: 2022  Patient name: Slade Gan  : 1955  MRN: [id number]  Referring provider: Suraj Leo DO  Dx:   Encounter Diagnosis     ICD-10-CM    1  Chronic neck pain  M54 2     G89 29        Subjective: patient states that he is feeling more sore after doing home improvements    Objective: See treatment diary below      Assessment: Tolerated treatment well  Patient demonstrated fatigue post treatment, exhibited good technique with therapeutic exercises and would benefit from continued PT  Some increased muscle soreness  Plan: Continue per plan of care  Precautions: none      Manuals             C3-7 upa right  Gr  Iv- 10sec x5            1st rib mob Gr  v            left cervical lateral glide Gr   Iv 10sec x5            Mid cervical right rotational snags 10x4                                                                                                                    Ther Ex             Cervical snag 10x2            Thoracic extension over 1/2 roller 64cjns0            Foam rolling 10min            Hip hinge with elbows on table 10sec x5            MWM right first rib 10x2                                                                                                                                 Modalities             Heat supine 10min

## 2022-02-10 ENCOUNTER — OFFICE VISIT (OUTPATIENT)
Dept: PHYSICAL THERAPY | Facility: MEDICAL CENTER | Age: 67
End: 2022-02-10
Payer: MEDICARE

## 2022-02-10 DIAGNOSIS — G89.29 CHRONIC NECK PAIN: Primary | ICD-10-CM

## 2022-02-10 DIAGNOSIS — M54.2 CHRONIC NECK PAIN: Primary | ICD-10-CM

## 2022-02-10 PROCEDURE — 97140 MANUAL THERAPY 1/> REGIONS: CPT | Performed by: PHYSICAL THERAPIST

## 2022-02-10 PROCEDURE — 97110 THERAPEUTIC EXERCISES: CPT | Performed by: PHYSICAL THERAPIST

## 2022-02-10 NOTE — PROGRESS NOTES
Daily Note     Today's date: 2/10/2022  Patient name: Jose Pisano  : 1955  MRN: [id number]  Referring provider: Azam Lang DO  Dx:   Encounter Diagnosis     ICD-10-CM    1  Chronic neck pain  M54 2     G89 29                   Subjective:  Patient states that he feels better today  Objective: See treatment diary below  Assessment:  Patient tolerated tx well c/ increased rotation to the right post tx  Tolerated treatment well  Patient would benefit from continued PT  Plan: Continue per plan of care  Precautions: none    Manuals             C3-7 upa right  Gr  Iv- 10sec x5            1st rib mob Gr  Iv             left cervical lateral glide Gr   Iv 10sec x5            Mid cervical right rotational snags 10x4            STM/MFR [place]                                                                                                       Ther Ex             Cervical snag 10x2            Thoracic extension over 1/2 roller 90oiev6            Foam rolling 10min            Hip hinge with elbows on table 10sec x5            MWM right first rib 10x2                                                                                                                                 Modalities             Heat supine 10min

## 2022-02-15 ENCOUNTER — OFFICE VISIT (OUTPATIENT)
Dept: PHYSICAL THERAPY | Facility: MEDICAL CENTER | Age: 67
End: 2022-02-15
Payer: MEDICARE

## 2022-02-15 DIAGNOSIS — M54.2 CHRONIC NECK PAIN: Primary | ICD-10-CM

## 2022-02-15 DIAGNOSIS — G89.29 CHRONIC NECK PAIN: Primary | ICD-10-CM

## 2022-02-15 PROCEDURE — 97110 THERAPEUTIC EXERCISES: CPT | Performed by: PHYSICAL THERAPIST

## 2022-02-15 PROCEDURE — 97140 MANUAL THERAPY 1/> REGIONS: CPT | Performed by: PHYSICAL THERAPIST

## 2022-02-15 NOTE — PROGRESS NOTES
Daily Note     Today's date: 2/15/2022  Patient name: Jg Aguilar  : 1955  MRN: [id number]  Referring provider: Nitza Dockery DO  Dx:   Encounter Diagnosis     ICD-10-CM    1  Chronic neck pain  M54 2     G89 29                   Subjective:  Patient states that he feels good  Objective: See treatment diary below  Assessment:  Patient demonstrates good tolerance to manuals c/ increased cervical ROM  Tolerated treatment well  Patient would benefit from continued PT  Plan: Continue per plan of care  Precautions: none    Manuals 2/15            C3-7 upa right  Gr  Iv- 10sec x5            1st rib mob Gr  Iv             left cervical lateral glide Gr   Iv 10sec x5            Mid cervical right rotational snags 10x4            STM/MFR [place]                                                                                                       Ther Ex             Cervical snag 10x2            Thoracic extension over 1/2 roller [id number]            Foam rolling 10min            Hip hinge with elbows on table 10sec x5            MWM right first rib 10x2                                                                                                                                 Modalities             Heat supine 10min

## 2022-02-18 ENCOUNTER — OFFICE VISIT (OUTPATIENT)
Dept: PHYSICAL THERAPY | Facility: MEDICAL CENTER | Age: 67
End: 2022-02-18
Payer: MEDICARE

## 2022-02-18 DIAGNOSIS — M54.2 CHRONIC NECK PAIN: Primary | ICD-10-CM

## 2022-02-18 DIAGNOSIS — G89.29 CHRONIC NECK PAIN: Primary | ICD-10-CM

## 2022-02-18 PROCEDURE — 97140 MANUAL THERAPY 1/> REGIONS: CPT | Performed by: PHYSICAL THERAPIST

## 2022-02-18 PROCEDURE — 97110 THERAPEUTIC EXERCISES: CPT | Performed by: PHYSICAL THERAPIST

## 2022-02-18 NOTE — PROGRESS NOTES
Daily Note     Today's date: 2022  Patient name: Joanie Burgos  : 1955  MRN: [id number]  Referring provider: Love Scruggs DO  Dx:   Encounter Diagnosis     ICD-10-CM    1  Chronic neck pain  M54 2     G89 29                   Subjective:  Patient states that he feels good  Objective: See treatment diary below  Assessment:  Patient demonstrates good tolerance to manuals c/ increased cervical ROM  Tolerated treatment well  Patient would benefit from continued PT  Plan: Continue per plan of care  Precautions: none    Manuals             C3-7 upa right  Gr  Iv- 10sec x5            1st rib mob Gr  Iv             left cervical lateral glide Gr   Iv 10sec x5            Mid cervical right rotational snags 10x4            STM/MFR gs                                                                                                       Ther Ex             Cervical snag 10x2            Thoracic extension over 1/2 roller 06yobv9            Foam rolling 10min            Hip hinge with elbows on table 10sec x5            MWM right first rib 10x2                                                                                                                                 Modalities             Heat supine 10min

## 2022-02-22 ENCOUNTER — OFFICE VISIT (OUTPATIENT)
Dept: PHYSICAL THERAPY | Facility: MEDICAL CENTER | Age: 67
End: 2022-02-22
Payer: MEDICARE

## 2022-02-22 DIAGNOSIS — G89.29 CHRONIC NECK PAIN: Primary | ICD-10-CM

## 2022-02-22 DIAGNOSIS — M54.2 CHRONIC NECK PAIN: Primary | ICD-10-CM

## 2022-02-22 PROCEDURE — 97110 THERAPEUTIC EXERCISES: CPT | Performed by: PHYSICAL THERAPIST

## 2022-02-22 PROCEDURE — 97140 MANUAL THERAPY 1/> REGIONS: CPT | Performed by: PHYSICAL THERAPIST

## 2022-02-22 NOTE — PROGRESS NOTES
Daily Note     Today's date: 2022  Patient name: Gerardo Johnston  : 1955  MRN: [id number]  Referring provider: Lavon Mata DO  Dx:   Encounter Diagnosis     ICD-10-CM    1  Chronic neck pain  M54 2     G89 29                   Subjective:  Patient states that he feels good  Some discomfort in his upper trap 4/10 today  Objective: See treatment diary below  Assessment:  Patient demonstrates good tolerance to manuals c/ increased cervical ROM  Tolerated treatment well  Patient would benefit from continued PT  Plan: Continue per plan of care  Precautions: none    Manuals             C3-7 upa right  Gr  Iv- 10sec x5            1st rib mob Gr  Iv             left cervical lateral glide Gr   Iv 10sec x5            Mid cervical right rotational snags 10x4            STM/MFR gs                                                                                                       Ther Ex             Cervical snag 10x2            Thoracic extension over 1/2 roller [id number]            Foam rolling 10min            Hip hinge with elbows on table 10sec x5            MWM right first rib 10x2                                                                                                                                 Modalities             Heat supine 10min

## 2022-02-25 ENCOUNTER — OFFICE VISIT (OUTPATIENT)
Dept: PHYSICAL THERAPY | Facility: MEDICAL CENTER | Age: 67
End: 2022-02-25
Payer: MEDICARE

## 2022-02-25 DIAGNOSIS — G89.29 CHRONIC NECK PAIN: Primary | ICD-10-CM

## 2022-02-25 DIAGNOSIS — M54.2 CHRONIC NECK PAIN: Primary | ICD-10-CM

## 2022-02-25 PROCEDURE — 97140 MANUAL THERAPY 1/> REGIONS: CPT | Performed by: PHYSICAL THERAPIST

## 2022-02-25 PROCEDURE — 97110 THERAPEUTIC EXERCISES: CPT | Performed by: PHYSICAL THERAPIST

## 2022-02-25 NOTE — PROGRESS NOTES
Daily Note     Today's date: 2022  Patient name: Naman Asencio  : 1955  MRN: [id number]  Referring provider: Price Loredo DO  Dx:   Encounter Diagnosis     ICD-10-CM    1  Chronic neck pain  M54 2     G89 29                   Subjective:  Patient states that he feels some increased stiffness in the upper trap following his exercise from yesterday  Objective: See treatment diary below  Assessment:  Patient demonstrates good tolerance to manuals c/ increased cervical ROM  Tolerated treatment well  Patient would benefit from continued PT  Plan: Continue per plan of care  Precautions: none    Manuals             C3-7 upa right  Gr  Iv- 10sec x5            1st rib mob Gr  Iv             left cervical lateral glide Gr   Iv 10sec x5            Mid cervical right rotational snags 10x4            STM/MFR gs                                                                                                       Ther Ex             Cervical snag 10x2            Thoracic extension over 1/2 roller [id number]            Foam rolling 10min            Hip hinge with elbows on table 10sec x5            MWM right first rib 10x2                                                                                                                                 Modalities             Heat supine 10min

## 2022-02-28 ENCOUNTER — OFFICE VISIT (OUTPATIENT)
Dept: PHYSICAL THERAPY | Facility: MEDICAL CENTER | Age: 67
End: 2022-02-28
Payer: MEDICARE

## 2022-02-28 DIAGNOSIS — G89.29 CHRONIC NECK PAIN: Primary | ICD-10-CM

## 2022-02-28 DIAGNOSIS — M54.2 CHRONIC NECK PAIN: Primary | ICD-10-CM

## 2022-02-28 PROCEDURE — 97140 MANUAL THERAPY 1/> REGIONS: CPT | Performed by: PHYSICAL THERAPIST

## 2022-02-28 PROCEDURE — 97110 THERAPEUTIC EXERCISES: CPT | Performed by: PHYSICAL THERAPIST

## 2022-02-28 NOTE — PROGRESS NOTES
Daily Note     Today's date: 2022  Patient name: Price Kemp  : 1955  MRN: [id number]  Referring provider: Tereza Saldaña DO  Dx:   Encounter Diagnosis     ICD-10-CM    1  Chronic neck pain  M54 2     G89 29                   Subjective:  Patient states that he feels some increased stiffness in the upper trap following his exercise from yesterday  Objective: See treatment diary below  Assessment:  Patient demonstrates good tolerance to manuals c/ increased cervical ROM  Tolerated treatment well  Patient would benefit from continued PT  Plan: Continue per plan of care  Precautions: none    Manuals             C3-7 upa right  Gr  Iv- 10sec x5            1st rib mob Gr  Iv             left cervical lateral glide Gr   Iv 10sec x5            Mid cervical right rotational snags 10x4            STM/MFR gs                                                                                                       Ther Ex             Cervical snag 10x2            Thoracic extension over 1/2 roller [id number]            Foam rolling 10min            Hip hinge with elbows on table 10sec x5            MWM right first rib 10x2                                                                                                                                 Modalities             Heat supine 10min

## 2022-03-07 ENCOUNTER — OFFICE VISIT (OUTPATIENT)
Dept: PHYSICAL THERAPY | Facility: MEDICAL CENTER | Age: 67
End: 2022-03-07
Payer: MEDICARE

## 2022-03-07 DIAGNOSIS — M54.2 CHRONIC NECK PAIN: Primary | ICD-10-CM

## 2022-03-07 DIAGNOSIS — G89.29 CHRONIC NECK PAIN: Primary | ICD-10-CM

## 2022-03-07 PROCEDURE — 97140 MANUAL THERAPY 1/> REGIONS: CPT | Performed by: PHYSICAL THERAPIST

## 2022-03-07 PROCEDURE — 97110 THERAPEUTIC EXERCISES: CPT | Performed by: PHYSICAL THERAPIST

## 2022-03-07 NOTE — PROGRESS NOTES
Daily Note     Today's date: 3/7/2022  Patient name: Ela Stoner  : 1955  MRN: [id number]  Referring provider: Nikki Negro DO  Dx:   Encounter Diagnosis     ICD-10-CM    1  Chronic neck pain  M54 2     G89 29                   Subjective:  Patient states that he feels some increased stiffness in the upper trap following his exercise from yesterday  Objective: See treatment diary below  Assessment:  Patient demonstrates good tolerance to manuals c/ increased cervical ROM  Tolerated treatment well  Patient would benefit from continued PT  Plan: Continue per plan of care  Precautions: none    Manuals             C3-7 upa right  Gr  Iv- 10sec x5            1st rib mob Gr  Iv             left cervical lateral glide Gr   Iv 10sec x5            Mid cervical right rotational snags 10x4            STM/MFR gs                                                                                                       Ther Ex             Cervical snag 10x2            Thoracic extension over 1/2 roller [id number]            Foam rolling 10min            Hip hinge with elbows on table 10sec x5            MWM right first rib 10x2                                                                                                                                 Modalities             Heat supine 10min

## 2022-03-15 ENCOUNTER — APPOINTMENT (OUTPATIENT)
Dept: PHYSICAL THERAPY | Facility: MEDICAL CENTER | Age: 67
End: 2022-03-15
Payer: MEDICARE

## 2022-03-29 ENCOUNTER — OFFICE VISIT (OUTPATIENT)
Dept: PHYSICAL THERAPY | Facility: MEDICAL CENTER | Age: 67
End: 2022-03-29
Payer: MEDICARE

## 2022-03-29 DIAGNOSIS — G89.29 CHRONIC NECK PAIN: Primary | ICD-10-CM

## 2022-03-29 DIAGNOSIS — M54.2 CHRONIC NECK PAIN: Primary | ICD-10-CM

## 2022-03-29 PROCEDURE — 97140 MANUAL THERAPY 1/> REGIONS: CPT | Performed by: PHYSICAL THERAPIST

## 2022-03-29 PROCEDURE — 97110 THERAPEUTIC EXERCISES: CPT | Performed by: PHYSICAL THERAPIST

## 2022-03-29 NOTE — PROGRESS NOTES
Daily Note     Today's date: 3/29/2022  Patient name: Vinod Franco  : 1955  MRN: [id number]  Referring provider: Arcelia Rivera DO  Dx:   Encounter Diagnosis     ICD-10-CM    1  Chronic neck pain  M54 2     G89 29                   Subjective:  Patient states that he feels some increased stiffness in the upper trap following his exercise from yesterday  Is ready to be done with treatment for now  Objective: See treatment diary below  Assessment:  Vinod Franco has been compliant with attending PT and home exercise program since initial eval   Butch Holm  has made improvements in objective data since initial evalulation and has achieved all goals  Patient reports having returned to their prior level or function  It was mutually agreed to Discharge to home exercise program at this time  Plan: Continue per plan of care  Precautions: none    Manuals             C3-7 upa right  Gr  Iv- 10sec x5            1st rib mob Gr  Iv             left cervical lateral glide Gr   Iv 10sec x5            Mid cervical right rotational snags 10x4            STM/MFR gs                                                                                                       Ther Ex             Cervical snag 10x2            Thoracic extension over 1/2 roller [id number]            Foam rolling 10min            Hip hinge with elbows on table 10sec x5            MWM right first rib 10x2                                                                                                                                 Modalities             Heat supine 10min

## 2022-04-18 NOTE — PROGRESS NOTES
Daily Note     Today's date: 2019  Patient name: Waldemar Stokes  : 1955  MRN: [id number]  Referring provider: Emerson Murray MD  Dx:   Encounter Diagnosis     ICD-10-CM    1  Left knee pain, unspecified chronicity M25 562    2  S/P arthroscopic partial medial meniscectomy Z98 890                   Subjective: Pt reports that he experienced some relief and less pain after k-tape application to medial knee  Pt frustrated with current setback  Objective: See treatment diary below      Assessment: Tolerated treatment well  Patient demonstrated fatigue post treatment, exhibited good technique with therapeutic exercises and would benefit from continued PT  Performed ex's without pain, but demonstrated quad fatigue at end of session  Plan: Continue per plan of care        Daily Treatment Diary       Exercise Diary  6/3 6/6 6/10 6/13 6/17 6/20 6/24 6/27 7/8 7/11   Bike  10' 10' 10' 10 10' 10' 10' 10' 10' 10'   HS str 3x30" 3x30" 3x30" 3x30" 3x30" 3x30" 3x30" 3x30" 3x30" 3x30"   Gastroc str 3x30" 3x30" 3x30" 3x30" 3x30" 3x30" 3x30" 3x30" 3x30" 3x30"   Heel slides x30 x30 x30 x30 x30 D/C x30 NR x30 x30   QS 5"  x50 5"  x50 5"  x50 5"  x50 5"  x50 5"  x50 5"  x50 5"  x50 5"  x50 5"  x50   SLR x 4 3x12 3x15 1#  3x10 3#  3x10 3#  3x12 3#  3x12 3#  3x10 3#  3x10 1#  3x10 1#  3x12   Prone TKE [place]  21#  3x15 [place]  21#  3x15 [place]  22#  3x10 [place]  22#  3x10 [place]  25#  3x10 [place]  32#  3x15 NP SAQ  3x15  ecc focus NP SAQ  1#  3x15   Prone HS curls 3x12 3x15 1#  3x10 3#  3x10 3#  3x10 3#  3x12 3#  3x10 3#  3x10 1#  3x10 1#  3x10   Wall slides 3x15 5#  3x10 10#  3x10 10#  3x10 10#  3x10 10#  3x12 NP NP NP NP   Bridging         3x10 3x15   SLS  np np 3x 3x 3x W/  alpha  3x NP NP NP NP   Heel raises bilat  3x10 bilat  3x10 SL  3x10 SL  3x10 SL  3x10 SL  3x12 NP NP NP 3x10   Step up over and down L3  3x15 L3  3x15 L3  3x15 L3  3x15 np np NP NP NP NP   Quad str 3x30" 3x30" 3x30" 3x30" 3x30" 3x30" The ECG showed sinus rhythm.  3x30" 3x30" 3x30" 3x30"   Standing HS curls 3x15 3x15 3x15 3x15 3x15 1#  3x10 0#  3x10 3x10  0# NP NP   LP np 120#  3x10 130#  3x10 150#  3x10 150#  3x15 150#  3x15 NP NP NP NP   Step downs     L1  3x10 L2  3x10 NP NP NP NP   Medial and lat tibial rot mobs grade II       HK NP NP NP   Knee flexion jt mobs grade II       HK NP NP NP   HPL medial knee       HK NP HK NP   KT tape L MCL         KO KO   MH       15' NP NP NP

## 2022-06-29 ENCOUNTER — OFFICE VISIT (OUTPATIENT)
Dept: PHYSICAL THERAPY | Facility: MEDICAL CENTER | Age: 67
End: 2022-06-29
Payer: MEDICARE

## 2022-06-29 DIAGNOSIS — M54.6 ACUTE RIGHT-SIDED THORACIC BACK PAIN: Primary | ICD-10-CM

## 2022-06-29 PROCEDURE — 97161 PT EVAL LOW COMPLEX 20 MIN: CPT | Performed by: PHYSICAL THERAPIST

## 2022-06-29 PROCEDURE — 97110 THERAPEUTIC EXERCISES: CPT | Performed by: PHYSICAL THERAPIST

## 2022-06-29 PROCEDURE — 97140 MANUAL THERAPY 1/> REGIONS: CPT | Performed by: PHYSICAL THERAPIST

## 2022-06-29 NOTE — PROGRESS NOTES
PT Evaluation     Today's date: 2022  Patient name: Fabiola Garcia  : 1955  MRN: [id number]  Referring provider: Rajan Orta DO  Dx:   Encounter Diagnosis     ICD-10-CM    1  Acute right-sided thoracic back pain  M54 6                   Assessment/Plan  Patient is a very pleasant 70 yo male who presents with symptoms of mid back pain secondary to rib 7-9 somatic dysfunction following excessive golf  Patient symptoms are consistent with rib subluxation and concurrent spasm  Patient will benefit from skilled PT intervention to address issues of pain and return to normal level of function  2x a week for 4 weeks  Subjective  Patient states that he had played golf 4 days in a row and the night of the last day he reached into a cabinet and felt something stabbing in his mid back  Since that time he has had spasm and discomfort  He would like to be able to return to golf as desired  Exercise seems to help  Objective  Red Flags: none  Pain: 3-10  Reflexes: 2/5 BUE  Posture: WFL  AROM: limited in all thoracic motion due to pain  Pain with inhalation   PROM: deferred  PAROM: diminished at right rib 7-9 with comparable sign  Palpation: TTP of right mid back musculature with multiple trigger points  Special Tests: deferred  Coordination of Movement/strengthe: [place] [place]  Pain with all movement of rib cage at end ROM     Goals:  - Pt I with initial HEP in 1-2 visits  - Improve ROM to full without pain  - improved stabilizing structure activation and improved feed forward mechanism with distal activation  - Increase Functional Status Measure measured by FOTO by Virgen Munson  - return to golf     Precautions: none      Manuals             Percussion STM 10min                                                   Neuro Re-Ed                                                                                                        Ther Ex             scap 4  Green band 30x Ther Activity                                       Gait Training                                       Modalities

## 2022-06-29 NOTE — LETTER
2022    Nataly Diana DO  [facility]    Patient: Amber Us   YOB: 1955   Date of Visit: 2022     Encounter Diagnosis     ICD-10-CM    1  Acute right-sided thoracic back pain  M54 6        Dear Dr Moulton Slight:    Thank you for your recent referral of Amber Us  Please review the attached evaluation summary from Randy's recent visit  Please verify that you agree with the plan of care by signing the attached order  If you have any questions or concerns, please do not hesitate to call  I sincerely appreciate the opportunity to share in the care of one of your patients and hope to have another opportunity to work with you in the near future  Sincerely,    Lars Larsen, PT      Referring Provider:      I certify that I have read the below Plan of Care and certify the need for these services furnished under this plan of treatment while under my care  Nataly Diana DO  [facility] [address] [address]  Via Fax: [phone]          PT Evaluation     Today's date: 2022  Patient name: Amber Us  : 1955  MRN: [id number]  Referring provider: Kervin De León DO  Dx:   Encounter Diagnosis     ICD-10-CM    1  Acute right-sided thoracic back pain  M54 6                   Assessment/Plan  Patient is a very pleasant 68 yo male who presents with symptoms of mid back pain secondary to rib 7-9 somatic dysfunction following excessive golf  Patient symptoms are consistent with rib subluxation and concurrent spasm  Patient will benefit from skilled PT intervention to address issues of pain and return to normal level of function  2x a week for 4 weeks  Subjective  Patient states that he had played golf 4 days in a row and the night of the last day he reached into a cabinet and felt something stabbing in his mid back  Since that time he has had spasm and discomfort    He would like to be able to return to golf as desired  Exercise seems to help  Objective  Red Flags: none  Pain: 3-9/10  Reflexes: 2/5 BUE  Posture: WFL  AROM: limited in all thoracic motion due to pain  Pain with inhalation   PROM: deferred  PAROM: diminished at right rib 7-9 with comparable sign  Palpation: TTP of right mid back musculature with multiple trigger points  Special Tests: deferred  Coordination of Movement/strengthe: [facility]  Pain with all movement of rib cage at end ROM     Goals:  - Pt I with initial HEP in 1-2 visits  - Improve ROM to full without pain  - improved stabilizing structure activation and improved feed forward mechanism with distal activation  - Increase Functional Status Measure measured by FOTO by Maxine Quinones  - return to golf     Precautions: none      Manuals             Percussion STM 10min                                                   Neuro Re-Ed                                                                                                        Ther Ex             scap 4  Green band 30x                                                                                                       Ther Activity                                       Gait Training                                       Modalities

## 2022-07-05 ENCOUNTER — OFFICE VISIT (OUTPATIENT)
Dept: PHYSICAL THERAPY | Facility: MEDICAL CENTER | Age: 67
End: 2022-07-05
Payer: MEDICARE

## 2022-07-05 DIAGNOSIS — M54.6 ACUTE RIGHT-SIDED THORACIC BACK PAIN: Primary | ICD-10-CM

## 2022-07-05 PROCEDURE — 97140 MANUAL THERAPY 1/> REGIONS: CPT | Performed by: PHYSICAL THERAPIST

## 2022-07-05 PROCEDURE — 97110 THERAPEUTIC EXERCISES: CPT | Performed by: PHYSICAL THERAPIST

## 2022-07-05 NOTE — PROGRESS NOTES
Daily Note     Today's date: 2022  Patient name: Hosseni Nix  : 1955  MRN: [id number]  Referring provider: Natasha Coles DO  Dx:   Encounter Diagnosis     ICD-10-CM    1  Acute right-sided thoracic back pain  M54 6                   Subjective:  Patient states that he feels good  Looking forward to going to play golf this weekend    Objective: See treatment diary below  Assessment:  Patient demonstrates good tolerance to manuals c/ increased thoracic ROM  Tolerated treatment well  Patient would benefit from continued PT  Plan: Continue per plan of care  Precautions: none    Manuals             T7-9 upa right  Gr  Iv- 10sec x5            7-9 rib mob Gr  Iv             left thoracic lateral glide Gr   Iv 10sec x5            Mid thoracic right rotational snags 10x4            STM/MFR gs                                                                                                       Ther Ex                          Thoracic extension over 1/2 roller [id number]            Foam rolling 10min            Hip hinge with elbows on table 10sec x5            MWM right 7-9th rib 10x2            scap 4 30             thoracic rotation with resistance 10x2                                                                                                       Modalities             Heat supine

## 2022-07-12 ENCOUNTER — OFFICE VISIT (OUTPATIENT)
Dept: PHYSICAL THERAPY | Facility: MEDICAL CENTER | Age: 67
End: 2022-07-12
Payer: MEDICARE

## 2022-07-12 DIAGNOSIS — M54.6 ACUTE RIGHT-SIDED THORACIC BACK PAIN: Primary | ICD-10-CM

## 2022-07-12 PROCEDURE — 97110 THERAPEUTIC EXERCISES: CPT | Performed by: PHYSICAL THERAPIST

## 2022-07-12 PROCEDURE — 97140 MANUAL THERAPY 1/> REGIONS: CPT | Performed by: PHYSICAL THERAPIST

## 2022-07-12 NOTE — PROGRESS NOTES
Daily Note     Today's date: 2022  Patient name: Alen Aase  : 1955  MRN: [id number]  Referring provider: Ivy Robertson DO  Dx:   Encounter Diagnosis     ICD-10-CM    1  Acute right-sided thoracic back pain  M54 6                   Subjective:  Patient states that he feels a little sore after golfing but a good muscle soreness  Objective: See treatment diary below  Assessment:  Patient demonstrates good tolerance to manuals c/ increased thoracic ROM  Tolerated treatment well  Patient would benefit from continued PT  Patient was encouraged to return to the gym slowly  Plan: Continue per plan of care  Precautions: none    Manuals             T7-9 upa right  Gr  Iv- 10sec x5            7-9 rib mob Gr  Iv             left thoracic lateral glide Gr   Iv 10sec x5            Mid thoracic right rotational snags 10x4            STM/MFR gs                                                                                                       Ther Ex                          Thoracic extension over 1/2 roller [id number]            Foam rolling 10min            Hip hinge with elbows on table 10sec x5            MWM right 7-9th rib 10x2            scap 4 30             thoracic rotation with resistance 10x2                                                                                                       Modalities             Heat supine

## 2022-07-14 ENCOUNTER — OFFICE VISIT (OUTPATIENT)
Dept: PHYSICAL THERAPY | Facility: MEDICAL CENTER | Age: 67
End: 2022-07-14
Payer: MEDICARE

## 2022-07-14 DIAGNOSIS — M54.6 ACUTE RIGHT-SIDED THORACIC BACK PAIN: Primary | ICD-10-CM

## 2022-07-14 PROCEDURE — 97110 THERAPEUTIC EXERCISES: CPT | Performed by: PHYSICAL THERAPIST

## 2022-07-14 PROCEDURE — 97140 MANUAL THERAPY 1/> REGIONS: CPT | Performed by: PHYSICAL THERAPIST

## 2022-07-14 NOTE — PROGRESS NOTES
Daily Note     Today's date: 2022  Patient name: Ela Stoner  : 1955  MRN: [id number]  Referring provider: Nikki Negro DO  Dx:   Encounter Diagnosis     ICD-10-CM    1  Acute right-sided thoracic back pain  M54 6                   Subjective:  Patient states that he returned to the gym and is feeling good  Some soreness  Objective: See treatment diary below  Assessment:  Patient demonstrates good tolerance to manuals c/ increased thoracic ROM  Tolerated treatment well  Patient would benefit from continued PT  Patient was encouraged to return to the gym slowly  Plan: Continue per plan of care  Precautions: none    Manuals             T7-9 upa right  Gr  Iv- 10sec x5            7-9 rib mob Gr  Iv             left thoracic lateral glide Gr   Iv 10sec x5            Mid thoracic right rotational snags 10x4            STM/MFR gs                                                                                                       Ther Ex                          Thoracic extension over 1/2 roller [id number]            Foam rolling 10min            Hip hinge with elbows on table 10sec x5            MWM right 7-9th rib 10x2            scap 4 30             thoracic rotation with resistance 10x2                                                                                                       Modalities             Heat supine

## 2022-07-26 ENCOUNTER — OFFICE VISIT (OUTPATIENT)
Dept: PHYSICAL THERAPY | Facility: MEDICAL CENTER | Age: 67
End: 2022-07-26
Payer: MEDICARE

## 2022-07-26 DIAGNOSIS — M54.6 ACUTE RIGHT-SIDED THORACIC BACK PAIN: Primary | ICD-10-CM

## 2022-07-26 PROCEDURE — 97140 MANUAL THERAPY 1/> REGIONS: CPT | Performed by: PHYSICAL THERAPIST

## 2022-07-26 PROCEDURE — 97110 THERAPEUTIC EXERCISES: CPT | Performed by: PHYSICAL THERAPIST

## 2022-07-26 NOTE — PROGRESS NOTES
Daily Note     Today's date: 2022  Patient name: Annamae Lanes  : 1955  MRN: [id number]  Referring provider: Katlyn Ayala DO  Dx:   Encounter Diagnosis     ICD-10-CM    1  Acute right-sided thoracic back pain  M54 6                   Subjective:  Patient states that he returned to the gym and is feeling good  Played golf yesterday and was a little sore  Objective: See treatment diary below  Assessment:  Patient demonstrates good tolerance to manuals c/ increased thoracic ROM  Tolerated treatment well  Patient would benefit from continued PT  Patient was encouraged to return to the gym slowly  He will follow up in 2 weeks and if all is well discharge at that time  Plan: Continue per plan of care  Precautions: none    Manuals             T7-9 upa right  Gr  Iv- 10sec x5            7-9 rib mob Gr  Iv             left thoracic lateral glide Gr   Iv 10sec x5            Mid thoracic right rotational snags 10x4            STM/MFR gs                                                                                                       Ther Ex                          Thoracic extension over 1/2 roller [id number]            Foam rolling 10min            Hip hinge with elbows on table 10sec x5            MWM right 7-9th rib 10x2            scap 4 30             thoracic rotation with resistance 10x2                                                                                                       Modalities             Heat supine

## 2022-07-28 ENCOUNTER — APPOINTMENT (OUTPATIENT)
Dept: PHYSICAL THERAPY | Facility: MEDICAL CENTER | Age: 67
End: 2022-07-28
Payer: MEDICARE

## 2022-08-09 ENCOUNTER — OFFICE VISIT (OUTPATIENT)
Dept: PHYSICAL THERAPY | Facility: MEDICAL CENTER | Age: 67
End: 2022-08-09
Payer: MEDICARE

## 2022-08-09 DIAGNOSIS — M54.6 ACUTE RIGHT-SIDED THORACIC BACK PAIN: Primary | ICD-10-CM

## 2022-08-09 PROCEDURE — 97110 THERAPEUTIC EXERCISES: CPT | Performed by: PHYSICAL THERAPIST

## 2022-08-09 PROCEDURE — 97140 MANUAL THERAPY 1/> REGIONS: CPT | Performed by: PHYSICAL THERAPIST

## 2022-08-09 NOTE — PROGRESS NOTES
Daily Note     Today's date: 2022  Patient name: Gerardo Johnston  : 1955  MRN: [id number]  Referring provider: Lavon Mata DO  Dx:   Encounter Diagnosis     ICD-10-CM    1  Acute right-sided thoracic back pain  M54 6                   Subjective:  Returned to the gym returned to golf and only feeling a little sore the following day  Overall happy with his progression  Objective: See treatment diary below  Assessment:    Gerardo Johnston has been compliant with attending PT and home exercise program since initial eval   Emily Pavon  has made improvements in objective data since initial evalulation and has achieved all goals  Patient reports having returned to their prior level or function  It was mutually agreed to Discharge to home exercise program at this time  Precautions: none    Manuals             T7-9 upa right  Gr  Iv- 10sec x5            7-9 rib mob Gr  Iv             left thoracic lateral glide Gr   Iv 10sec x5            Mid thoracic right rotational snags 10x4            STM/MFR gs                                                                                                       Ther Ex                          Thoracic extension over 1/2 roller [id number]            Foam rolling 10min            Hip hinge with elbows on table 10sec x5            MWM right 7-9th rib 10x2            scap 4 30             thoracic rotation with resistance 10x2                                                                                                       Modalities             Heat supine

## 2023-02-07 ENCOUNTER — EVALUATION (OUTPATIENT)
Dept: PHYSICAL THERAPY | Facility: MEDICAL CENTER | Age: 68
End: 2023-02-07

## 2023-02-07 DIAGNOSIS — M25.511 ACUTE PAIN OF RIGHT SHOULDER: Primary | ICD-10-CM

## 2023-02-07 NOTE — LETTER
2023    Cecilia Domingo DO  [facility]    Patient: Lis Casillas   YOB: 1955   Date of Visit: 2023     Encounter Diagnosis     ICD-10-CM    1  Acute pain of right shoulder  M25 511           Dear Dr Nola Cruz:    Thank you for your recent referral of Lis Casillas  Please review the attached evaluation summary from Randy's recent visit  Please verify that you agree with the plan of care by signing the attached order  If you have any questions or concerns, please do not hesitate to call  I sincerely appreciate the opportunity to share in the care of one of your patients and hope to have another opportunity to work with you in the near future  Sincerely,    Yesi Hawkins, PT      Referring Provider:      I certify that I have read the below Plan of Care and certify the need for these services furnished under this plan of treatment while under my care  DO Teddy [id number] [address]  Via Fax: [phone]          PT Evaluation     Today's date: 2023  Patient name: Lis Casillas  : 1955  MRN: [id number]  Referring provider: Shoaib Mcnair DO  Dx:   Encounter Diagnosis     ICD-10-CM    1  Acute pain of right shoulder  M25 511                      Assessment/Plan  Patient is a very pleasant 78 yo male who presents with right shoulder RC tendinopathy secondary to poor ST control and subacromial impingement  Patient demonstrates tightness of the [address] posterior capsul as well  Patient is most likely suffering from inflammation at this point as well and might consider NSAIDS or possible injection if needed  Patient will benefit from skilled PT services to address his issues of pain and return to higher level of function  Patient should be seen 2x a week for 4 weeks       Subjective  Patient states that he has been having right shoulder pain over the past several weeks and was concerned because he has had left shoulder surgery and didn't want to go through all of that again  He has noted increased symptoms while gripping the treadmill during cardio exercise  Patient has modified his program and has stopped exercising  He would like to return to exercise as desired  Objective  Red Flags: none  Pain: 6-8/02 with certain reaching positions  Reflexes: 2+/5 throughout UE  Posture: slight forward head with rounding of the shoulders  Limited thoracic movement   AROM: full   PROM: full   PAROM: limited with right shoulder GH posterior glide  Palpation: TTP of parascapular musculature right  Special Tests: + cross body impingement, - empty can, - toure's, - ac compression, + neers  Coordination of Movement/strengthe: strength is function and WFL however patient does not show appropriate coordination of ST and [address] motion      Impairment Goals  - Pt I with initial HEP in 1-2 visits  - - Increase Functional Status Measure measured by FOTO by [facility]  - Patient will be independent with comprehensive HEP in 6-8 weeks  - Return to gym as desired    Precautions: none

## 2023-02-07 NOTE — PROGRESS NOTES
PT Evaluation     Today's date: 2023  Patient name: Danny Espinoza  : 1955  MRN: [id number]  Referring provider: Jarrett Carter DO  Dx:   Encounter Diagnosis     ICD-10-CM    1  Acute pain of right shoulder  M25 511                      Assessment/Plan  Patient is a very pleasant 76 yo male who presents with right shoulder RC tendinopathy secondary to poor ST control and subacromial impingement  Patient demonstrates tightness of the [facility] posterior capsul as well  Patient is most likely suffering from inflammation at this point as well and might consider NSAIDS or possible injection if needed  Patient will benefit from skilled PT services to address his issues of pain and return to higher level of function  Patient should be seen 2x a week for 4 weeks  Subjective  Patient states that he has been having right shoulder pain over the past several weeks and was concerned because he has had left shoulder surgery and didn't want to go through all of that again  He has noted increased symptoms while gripping the treadmill during cardio exercise  Patient has modified his program and has stopped exercising  He would like to return to exercise as desired  Objective  Red Flags: none  Pain: 3-8/74 with certain reaching positions  Reflexes: 2+/5 throughout UE  Posture: slight forward head with rounding of the shoulders  Limited thoracic movement   AROM: full   PROM: full   PAROM: limited with right shoulder GH posterior glide  Palpation: TTP of parascapular musculature right  Special Tests: + cross body impingement, - empty can, - toure's, - ac compression, + neers  Coordination of Movement/strengthe: strength is function and WFL however patient does not show appropriate coordination of  and [facility] motion      Impairment Goals  - Pt I with initial HEP in 1-2 visits  - - Increase Functional Status Measure measured by FOTO by [facility]  - Patient will be independent with comprehensive HEP in 6-8 weeks  - Return to gym as desired     Precautions: none

## 2023-02-14 ENCOUNTER — OFFICE VISIT (OUTPATIENT)
Dept: PHYSICAL THERAPY | Facility: MEDICAL CENTER | Age: 68
End: 2023-02-14

## 2023-02-14 DIAGNOSIS — M25.511 ACUTE PAIN OF RIGHT SHOULDER: Primary | ICD-10-CM

## 2023-02-14 NOTE — PROGRESS NOTES
Daily Note     Today's date: 2023  Patient name: Magaly Carmona  : 1955  MRN: [id number]  Referring provider: Erin Vicente DO  Dx:   Encounter Diagnosis     ICD-10-CM    1  Acute pain of right shoulder  M25 511                      Subjective: patient states that he is doing ok  Having a bit more discomfort in the his right hip flexor than the shoulder  Patient notes that he is having slight discomfort in the posterior right shoulder today as well  Objective: See treatment diary below      Assessment: Tolerated treatment well  Patient demonstrated fatigue post treatment, exhibited good technique with therapeutic exercises and would benefit from continued PT  Patient was progressed with scapular stability program and he was given instruction on foam rolling and stretching for the hip  Patient was familiar with the shoulder exercises as he has done them in the past        Plan: Continue per plan of care        Precautions: none  Daily Treatment Diary     Manual                                                                                   Exercise Diary              UBE 4            pec stretch [id number]            No monnies 15x2 blue band            Shoulder extension 15x2 blue band            Scapular retractions 15x2 black band            Horizontal abduction 15x2 blue band            Chin tucks             Thoracic extension over foam roller 5secx3            Foam roller on wall 5 min                                                                                                                                                               Modalities

## 2023-02-16 ENCOUNTER — OFFICE VISIT (OUTPATIENT)
Dept: PHYSICAL THERAPY | Facility: MEDICAL CENTER | Age: 68
End: 2023-02-16

## 2023-02-16 DIAGNOSIS — M25.511 ACUTE PAIN OF RIGHT SHOULDER: Primary | ICD-10-CM

## 2023-02-16 NOTE — PROGRESS NOTES
Daily Note     Today's date: 2023  Patient name: Mario Alberto Lacey  : 1955  MRN: [id number]  Referring provider: Shruthi Pappas DO  Dx:   Encounter Diagnosis     ICD-10-CM    1  Acute pain of right shoulder  M25 511                      Subjective: patient states that he is doing ok  Notes that his golf swing seems to be improved  Objective: See treatment diary below      Assessment: Tolerated treatment well  Patient demonstrated fatigue post treatment, exhibited good technique with therapeutic exercises and would benefit from continued PT  Patient was progressed with scapular stability program and he was given instruction on foam rolling and stretching for the hip  Patient was familiar with the shoulder exercises as he has done them in the past        Plan: Continue per plan of care        Precautions: none  Daily Treatment Diary     Manual                                                                                   Exercise Diary              UBE 4            pec stretch [id number]            No monnies 15x2 blue band            Shoulder extension 15x2 blue band            Scapular retractions 15x2 black band            Horizontal abduction 15x2 blue band                         Thoracic extension over foam roller 5secx3            Foam roller on wall 5 min            Push up plus 10x2                                                                                                                                                  Modalities

## 2023-02-21 ENCOUNTER — OFFICE VISIT (OUTPATIENT)
Dept: PHYSICAL THERAPY | Facility: MEDICAL CENTER | Age: 68
End: 2023-02-21

## 2023-02-21 DIAGNOSIS — M25.511 ACUTE PAIN OF RIGHT SHOULDER: Primary | ICD-10-CM

## 2023-02-21 NOTE — PROGRESS NOTES
Daily Note     Today's date: 2023  Patient name: Jg Aguilar  : 1955  MRN: [id number]  Referring provider: Nitza Dockery DO  Dx:   Encounter Diagnosis     ICD-10-CM    1  Acute pain of right shoulder  M25 511                      Subjective: patient states that he is doing ok  Notes that his golf swing seems to be improved  Objective: See treatment diary below      Assessment: Tolerated treatment well  Patient demonstrated fatigue post treatment, exhibited good technique with therapeutic exercises and would benefit from continued PT  Patient was progressed with scapular stability program and he was given instruction on foam rolling and stretching for the hip  Patient was familiar with the shoulder exercises as he has done them in the past        Plan: Continue per plan of care        Precautions: none  Daily Treatment Diary     Manual                                                                                   Exercise Diary              UBE 4            pec stretch [id number]            No monnies 15x2 blue band            Shoulder extension 15x2 blue band            Scapular retractions 15x2 black band            Horizontal abduction 15x2 blue band                         Thoracic extension over foam roller 5secx3            Foam roller on wall 5 min            Push up plus 10x2                                                                                                                                                  Modalities

## 2023-02-23 ENCOUNTER — OFFICE VISIT (OUTPATIENT)
Dept: PHYSICAL THERAPY | Facility: MEDICAL CENTER | Age: 68
End: 2023-02-23

## 2023-02-23 DIAGNOSIS — M25.511 ACUTE PAIN OF RIGHT SHOULDER: Primary | ICD-10-CM

## 2023-02-23 NOTE — PROGRESS NOTES
Daily Note     Today's date: 2023  Patient name: Jaye Lopez  : 1955  MRN: [id number]  Referring provider: Gary Lewis DO  Dx:   Encounter Diagnosis     ICD-10-CM    1  Acute pain of right shoulder  M25 511                      Subjective: patient states that he is doing ok  Feeling soreness in the posterior arm  Objective: See treatment diary below      Assessment: Tolerated treatment well  Patient demonstrated fatigue post treatment, exhibited good technique with therapeutic exercises and would benefit from continued PT  Patient was progressed with scapular stability program and he was given instruction on foam rolling and stretching for the hip  Patient was familiar with the shoulder exercises as he has done them in the past        Plan: Continue per plan of care        Precautions: none  Daily Treatment Diary     Manual                                                                                   Exercise Diary              UBE 4            pec stretch [id number]            No monnies 15x2 blue band            Shoulder extension 15x2 blue band            Scapular retractions 15x2 black band            Horizontal abduction 15x2 blue band            Overhead arm raises 15x2            Thoracic extension over foam roller 5secx3            Foam roller on wall 5 min            Push up plus 10x2            IR shoulder stretch 10sec x5            Single arm ER push outs 15x                                                                                                                        Modalities

## 2023-02-28 ENCOUNTER — OFFICE VISIT (OUTPATIENT)
Dept: PHYSICAL THERAPY | Facility: MEDICAL CENTER | Age: 68
End: 2023-02-28

## 2023-02-28 DIAGNOSIS — M25.511 ACUTE PAIN OF RIGHT SHOULDER: Primary | ICD-10-CM

## 2023-02-28 NOTE — PROGRESS NOTES
Daily Note     Today's date: 2023  Patient name: Cesar Sneed  : 1955  MRN: [id number]  Referring provider: Laurence Khanna DO  Dx:   Encounter Diagnosis     ICD-10-CM    1  Acute pain of right shoulder  M25 511                      Subjective: patient states that he is a little sore from exercise yesterday  Hit over 100 balls at the driving range this week with less pain  Objective: See treatment diary below      Assessment: Tolerated treatment well  Patient demonstrated fatigue post treatment, exhibited good technique with therapeutic exercises and would benefit from continued PT  Addition of thoracic mobility and swing control discussed  Plan: Continue per plan of care        Precautions: none  Daily Treatment Diary     Manual                                                                                   Exercise Diary              UBE 4            pec stretch [id number]            No monnies 15x2 blue band            Shoulder extension 15x2 blue band            Scapular retractions 15x2 black band            Horizontal abduction 15x2 blue band            Overhead arm raises 15x2            Thoracic extension over foam roller 5secx3            Foam roller on wall 5 min            Push up plus 10x2            IR shoulder stretch 10sec x5            Single arm ER push outs 15x            Thoracic rotational stretch 10x2                                                                                                           Modalities

## 2023-03-02 ENCOUNTER — OFFICE VISIT (OUTPATIENT)
Dept: PHYSICAL THERAPY | Facility: MEDICAL CENTER | Age: 68
End: 2023-03-02

## 2023-03-02 DIAGNOSIS — M25.511 ACUTE PAIN OF RIGHT SHOULDER: Primary | ICD-10-CM

## 2023-03-02 NOTE — PROGRESS NOTES
Daily Note     Today's date: 3/2/2023  Patient name: Janie Neely  : 1955  MRN: [id number]  Referring provider: Julia Hernandez DO  Dx:   Encounter Diagnosis     ICD-10-CM    1  Acute pain of right shoulder  M25 511                      Subjective: patient states that he is a little sore from exercise yesterday  Hit over 100 balls at the driving range this week with less pain  Objective: See treatment diary below      Assessment: Tolerated treatment well  Patient demonstrated fatigue post treatment, exhibited good technique with therapeutic exercises and would benefit from continued PT  Addition of thoracic mobility and swing control discussed  Plan: Continue per plan of care        Precautions: none  Daily Treatment Diary     Manual                                                                                   Exercise Diary              UBE 4            pec stretch [id number]            No monnies 15x2 blue band            Shoulder extension 15x2 blue band            Scapular retractions 15x2 black band            Horizontal abduction 15x2 blue band            Overhead arm raises 15x2            Thoracic extension over foam roller 5secx3            Foam roller on wall 5 min            Push up plus 10x2            IR shoulder stretch 10sec x5            Single arm ER push outs 15x            Thoracic rotational stretch 10x2            ER at 45deg 10x3 yellow band                                                                                              Modalities

## 2023-03-09 ENCOUNTER — OFFICE VISIT (OUTPATIENT)
Dept: PHYSICAL THERAPY | Facility: MEDICAL CENTER | Age: 68
End: 2023-03-09

## 2023-03-09 DIAGNOSIS — M25.511 ACUTE PAIN OF RIGHT SHOULDER: Primary | ICD-10-CM

## 2023-03-09 NOTE — PROGRESS NOTES
Daily Note     Today's date: 3/9/2023  Patient name: Mo Perrin  : 1955  MRN: [id number]  Referring provider: Janeen Gibson DO  Dx:   Encounter Diagnosis     ICD-10-CM    1  Acute pain of right shoulder  M25 511                      Subjective: patient states that he is a little sore from exercise yesterday  Notes that lifting his daughters dog may have irritated his right shoulder lower back and hip a bit, but doing ok today  Objective: See treatment diary below      Assessment: Tolerated treatment well  Patient demonstrated fatigue post treatment, exhibited good technique with therapeutic exercises and would benefit from continued PT  Plan: Continue per plan of care        Precautions: none  Daily Treatment Diary     Manual                                                                                   Exercise Diary              UBE 4            pec stretch [id number]            No monnies 15x2 blue band            Shoulder extension 15x2 blue band            Scapular retractions 15x2 black band            Horizontal abduction 15x2 blue band            Overhead arm raises 15x2            Thoracic extension over foam roller 5secx3            Foam roller on wall 5 min            Push up plus 10x2            IR shoulder stretch 10sec x5            Single arm ER push outs 15x            Thoracic rotational stretch 10x2            ER at 45deg 10x3 yellow band                                                                                              Modalities

## 2023-03-14 ENCOUNTER — OFFICE VISIT (OUTPATIENT)
Dept: PHYSICAL THERAPY | Facility: MEDICAL CENTER | Age: 68
End: 2023-03-14

## 2023-03-14 DIAGNOSIS — M25.511 ACUTE PAIN OF RIGHT SHOULDER: Primary | ICD-10-CM

## 2023-03-14 NOTE — PROGRESS NOTES
Daily Note     Today's date: 3/14/2023  Patient name: Mimi Lester  : 1955  MRN: [id number]  Referring provider: Tereza Salgado DO  Dx:   Encounter Diagnosis     ICD-10-CM    1  Acute pain of right shoulder  M25 511                      Subjective: patient states that he is a little sore from exercise yesterday  Notes that lifting his daughters dog may have irritated his right shoulder lower back and hip a bit, but doing ok today  Objective: See treatment diary below      Assessment: Tolerated treatment well  Patient demonstrated fatigue post treatment, exhibited good technique with therapeutic exercises and would benefit from continued PT  Plan: Continue per plan of care        Precautions: none  Daily Treatment Diary     Manual                                                                                   Exercise Diary              UBE 4            pec stretch [id number]            No monnies 15x2 blue band            Shoulder extension 15x2 blue band            Scapular retractions 15x2 black band            Horizontal abduction 15x2 blue band            Overhead arm raises 15x2            Thoracic extension over foam roller 5secx3            Foam roller on wall 5 min            Push up plus 10x2            IR shoulder stretch 10sec x5            Single arm ER push outs 15x            Thoracic rotational stretch 10x2            ER at 45deg 10x3 yellow band                                                                                              Modalities

## 2023-03-16 ENCOUNTER — OFFICE VISIT (OUTPATIENT)
Dept: PHYSICAL THERAPY | Facility: MEDICAL CENTER | Age: 68
End: 2023-03-16

## 2023-03-16 DIAGNOSIS — M25.511 ACUTE PAIN OF RIGHT SHOULDER: Primary | ICD-10-CM

## 2023-03-16 NOTE — PROGRESS NOTES
Daily Note     Today's date: 3/16/2023  Patient name: Tracy James  : 1955  MRN: [id number]  Referring provider: Elena Pantoja DO  Dx:   Encounter Diagnosis     ICD-10-CM    1  Acute pain of right shoulder  M25 511                      Subjective: patient states that he was able to work out this week with less pain    Objective: See treatment diary below      Assessment: Tolerated treatment well  Patient demonstrated fatigue post treatment, exhibited good technique with therapeutic exercises and would benefit from continued PT  Plan: Continue per plan of care        Precautions: none  Daily Treatment Diary     Manual                                                                                   Exercise Diary              UBE 4            pec stretch [id number]            No monnies 15x2 blue band            Shoulder extension 15x2 blue band            Scapular retractions 15x2 black band            Horizontal abduction 15x2 blue band            Overhead arm raises 15x2            Thoracic extension over foam roller 5secx3            Foam roller on wall 5 min            Push up plus 10x2            IR shoulder stretch 10sec x5            Single arm ER push outs 15x            Thoracic rotational stretch 10x2            ER at 45deg 10x3 yellow band                                                                                              Modalities

## 2023-03-21 ENCOUNTER — OFFICE VISIT (OUTPATIENT)
Dept: PHYSICAL THERAPY | Facility: MEDICAL CENTER | Age: 68
End: 2023-03-21

## 2023-03-21 DIAGNOSIS — M25.511 ACUTE PAIN OF RIGHT SHOULDER: Primary | ICD-10-CM

## 2023-03-21 NOTE — PROGRESS NOTES
Daily Note     Today's date: 3/21/2023  Patient name: Annamae Lanes  : 1955  MRN: [id number]  Referring provider: Katlyn Ayala DO  Dx:   Encounter Diagnosis     ICD-10-CM    1  Acute pain of right shoulder  M25 511                      Subjective: patient states that he is having some soreness today because he didn't stretch after his exercise yesterday due to time constraints  Patient notes that his symptoms are overall better  Objective: See treatment diary below      Assessment: Tolerated treatment well  Patient demonstrated fatigue post treatment, exhibited good technique with therapeutic exercises and would benefit from continued PT  Continue to push scapular activation and proper shoulder mechanics  Plan: Continue per plan of care        Precautions: none  Daily Treatment Diary     Manual                                                                                   Exercise Diary              UBE 4            pec stretch [id number]            No monnies 15x2 blue band            Shoulder extension 15x2 blue band            Scapular retractions 15x2 black band            Horizontal abduction 15x2 blue band            Overhead arm raises 15x2            Thoracic extension over foam roller 5secx3            Foam roller on wall 5 min            Push up plus 10x2            IR shoulder stretch 10sec x5            Single arm ER push outs 15x            Thoracic rotational stretch 10x2            ER at 45deg 10x3 yellow band                                                                                              Modalities

## 2023-03-23 ENCOUNTER — OFFICE VISIT (OUTPATIENT)
Dept: PHYSICAL THERAPY | Facility: MEDICAL CENTER | Age: 68
End: 2023-03-23

## 2023-03-23 DIAGNOSIS — M25.511 ACUTE PAIN OF RIGHT SHOULDER: Primary | ICD-10-CM

## 2023-03-23 NOTE — PROGRESS NOTES
Daily Note     Today's date: 3/23/2023  Patient name: Naman Asencio  : 1955  MRN: [id number]  Referring provider: Price Loredo DO  Dx:   Encounter Diagnosis     ICD-10-CM    1  Acute pain of right shoulder  M25 511                      Subjective: patient states that he is doing well having less issues with his working out and golf  Happy with progress  Objective: See treatment diary below      Assessment: Tolerated treatment well  Patient demonstrated fatigue post treatment, exhibited good technique with therapeutic exercises and would benefit from continued PT  Continue to push scapular activation and proper shoulder mechanics  Plan: Continue per plan of care        Precautions: none  Daily Treatment Diary     Manual                                                                                   Exercise Diary              UBE 4            pec stretch [id number]            No monnies 15x2 blue band            Shoulder extension 15x2 blue band            Scapular retractions 15x2 black band            Horizontal abduction 15x2 blue band            Overhead arm raises 15x2            Thoracic extension over foam roller 5secx3            Foam roller on wall 5 min            Push up plus 10x2            IR shoulder stretch 10sec x5            Single arm ER push outs 15x            Thoracic rotational stretch 10x2            ER at 45deg 10x3 yellow band                                                                                              Modalities

## 2023-03-28 ENCOUNTER — OFFICE VISIT (OUTPATIENT)
Dept: PHYSICAL THERAPY | Facility: MEDICAL CENTER | Age: 68
End: 2023-03-28

## 2023-03-28 DIAGNOSIS — M25.511 ACUTE PAIN OF RIGHT SHOULDER: Primary | ICD-10-CM

## 2023-03-28 NOTE — PROGRESS NOTES
Daily Note     Today's date: 3/28/2023  Patient name: Selina Pepe  : 1955  MRN: [id number]  Referring provider: Evelio Centeno DO  Dx:   Encounter Diagnosis     ICD-10-CM    1  Acute pain of right shoulder  M25 511                      Subjective: patient states that he is doing well overall  Having more issues with hip flexor than shoulder at this point  Objective: See treatment diary below      Assessment: Tolerated treatment well  Patient demonstrated fatigue post treatment, exhibited good technique with therapeutic exercises and would benefit from continued PT  Continue to push scapular activation and proper shoulder mechanics  Plan: Continue per plan of care        Precautions: none  Daily Treatment Diary     Manual                                                                                   Exercise Diary              UBE 4            pec stretch [id number]            No monnies 15x2 blue band            Shoulder extension 15x2 blue band            Scapular retractions 15x2 black band            Horizontal abduction 15x2 blue band            Overhead arm raises 15x2            Thoracic extension over foam roller 5secx3            Foam roller on wall 5 min            Push up plus 10x2            IR shoulder stretch 10sec x5            Single arm ER push outs 15x            Thoracic rotational stretch 10x2            ER at 45deg 10x3 yellow band                                                                                              Modalities

## 2023-03-30 ENCOUNTER — OFFICE VISIT (OUTPATIENT)
Dept: PHYSICAL THERAPY | Facility: MEDICAL CENTER | Age: 68
End: 2023-03-30

## 2023-03-30 DIAGNOSIS — M25.511 ACUTE PAIN OF RIGHT SHOULDER: Primary | ICD-10-CM

## 2023-03-30 NOTE — PROGRESS NOTES
Daily Note     Today's date: 3/30/2023  Patient name: Kinza Chaves  : 1955  MRN: [id number]  Referring provider: Govind Mcgarry DO  Dx:   Encounter Diagnosis     ICD-10-CM    1  Acute pain of right shoulder  M25 511                      Subjective: patient states that he is doing well overall  Needs to have some work done on his right hip    Objective: See treatment diary below      [address] has been compliant with attending PT and home exercise program since initial eval   Kristenstuart Johnson  has made improvements in objective data since initial evalulation and has achieved all goals  Patient reports having returned to their prior level or function  It was mutually agreed to Discharge to home exercise program at this time         Precautions: none  Daily Treatment Diary     Manual                                                                                   Exercise Diary              UBE 4            pec stretch [id number]            No monnies 15x2 blue band            Shoulder extension 15x2 blue band            Scapular retractions 15x2 black band            Horizontal abduction 15x2 blue band            Overhead arm raises 15x2            Thoracic extension over foam roller 5secx3            Foam roller on wall 5 min            Push up plus 10x2            IR shoulder stretch 10sec x5            Single arm ER push outs 15x            Thoracic rotational stretch 10x2            ER at 45deg 10x3 yellow band                                                                                              Modalities

## 2023-04-25 ENCOUNTER — OFFICE VISIT (OUTPATIENT)
Dept: PHYSICAL THERAPY | Facility: MEDICAL CENTER | Age: 68
End: 2023-04-25

## 2023-04-25 DIAGNOSIS — M25.551 RIGHT HIP PAIN: Primary | ICD-10-CM

## 2023-04-25 DIAGNOSIS — G89.29 CHRONIC GLUTEAL PAIN: ICD-10-CM

## 2023-04-25 DIAGNOSIS — M79.18 CHRONIC GLUTEAL PAIN: ICD-10-CM

## 2023-04-25 NOTE — PROGRESS NOTES
Daily Note     Today's date: 2023  Patient name: Lucero Luo  : 1955  MRN: [id number]  Referring provider: Grover Toledo DO  Dx:   Encounter Diagnosis     ICD-10-CM    1  Right hip pain  M25 551       2  Chronic gluteal pain  M79 18     G89 29                      Subjective: patient states that he is feeling frustrated with his right shoulder as it was very sore following golf this week  But notes that he has been having less hip discomfort  Objective: See treatment diary below      Assessment: Tolerated treatment well  Patient demonstrated fatigue post treatment, exhibited good technique with therapeutic exercises and would benefit from continued PT      Plan: Continue per plan of care        Precautions: right TKA       Daily Treatment Diary     Manual              Hip flexor stretch sl 10sec x5            Hip flexor stretch supine 10sec x5                                                       Exercise Diary              Hip flexor str 10sec x5            Hamstring str Hip hinge 10sec x5            Piriformis str supine 10sec x5            LTR 10x3                                      Bridging with band 30            Clamshell with band 30            Standing hip abduction 10x3            Standing hip extension 10x3            Side lying hip abduction 30            Bridge on ball 30 25lbs            Bike 5min                                                                                                           Modalities                           Moist heat

## 2023-05-02 ENCOUNTER — OFFICE VISIT (OUTPATIENT)
Dept: PHYSICAL THERAPY | Facility: MEDICAL CENTER | Age: 68
End: 2023-05-02

## 2023-05-02 DIAGNOSIS — G89.29 CHRONIC GLUTEAL PAIN: ICD-10-CM

## 2023-05-02 DIAGNOSIS — M79.18 CHRONIC GLUTEAL PAIN: ICD-10-CM

## 2023-05-02 DIAGNOSIS — M25.551 RIGHT HIP PAIN: Primary | ICD-10-CM

## 2023-05-02 NOTE — PROGRESS NOTES
Daily Note     Today's date: 2023  Patient name: Jose Lyle  : 1955  MRN: [id number]  Referring provider: Ana Ward DO  Dx:   Encounter Diagnosis     ICD-10-CM    1  Right hip pain  M25 551       2  Chronic gluteal pain  M79 18     G89 29                      Subjective: patient states that he is feeling frustrated with his right shoulder as it was very sore following golf this week  But notes that he has been having less hip discomfort  Objective: See treatment diary below      Assessment: Tolerated treatment well  Patient demonstrated fatigue post treatment, exhibited good technique with therapeutic exercises and would benefit from continued PT      Plan: Continue per plan of care        Precautions: right TKA       Daily Treatment Diary     Manual              Hip flexor stretch sl 10sec x5            Hip flexor stretch supine 10sec x5                                                       Exercise Diary              Hip flexor str 10sec x5            Hamstring str Hip hinge 10sec x5            Piriformis str supine 10sec x5            LTR 10x3                         Lunging lateral hip stretch 10sec x5            Bridging with band 30            Clamshell with band 30            Standing hip abduction 10x3            Standing hip extension 10x3            Side lying hip abduction 30            Bridge on ball 30 25lbs            Bike 10min                                                                                                           Modalities                           Moist heat

## 2023-05-16 ENCOUNTER — OFFICE VISIT (OUTPATIENT)
Dept: PHYSICAL THERAPY | Facility: MEDICAL CENTER | Age: 68
End: 2023-05-16

## 2023-05-16 DIAGNOSIS — M79.18 CHRONIC GLUTEAL PAIN: ICD-10-CM

## 2023-05-16 DIAGNOSIS — M25.551 RIGHT HIP PAIN: Primary | ICD-10-CM

## 2023-05-16 DIAGNOSIS — G89.29 CHRONIC GLUTEAL PAIN: ICD-10-CM

## 2023-05-16 NOTE — PROGRESS NOTES
Daily Note     Today's date: 2023  Patient name: Jeronimo Lorenzo  : 1955  MRN: [id number]  Referring provider: Claudia Cooper DO  Dx:   Encounter Diagnosis     ICD-10-CM    1  Right hip pain  M25 551       2  Chronic gluteal pain  M79 18     G89 29                      Subjective: patient states that he is feeling about the same but overall is better  He continues to have some issues but has also not been doing the work  Objective: See treatment diary below      Assessment:   Jeronimo Lorenzo has been compliant with attending PT and home exercise program since initial eval   Jessica Mccollum  has made improvements in objective data since initial evalulation and has achieved all goals  Patient reports having returned to their prior level or function  It was mutually agreed to Discharge to home exercise program at this time      Precautions: right TKA       Daily Treatment Diary     Manual              Hip flexor stretch sl 10sec x5            Hip flexor stretch supine 10sec x5                                                       Exercise Diary              Hip flexor str 10sec x5            Hamstring str Hip hinge 10sec x5            Piriformis str supine 10sec x5            LTR 10x3                         Lunging lateral hip stretch 10sec x5            Bridging with band 30            Clamshell with band 30            Standing hip abduction 10x3            Standing hip extension 10x3            Side lying hip abduction 30            Bridge on ball 30 25lbs            Bike 10min                                                                                                           Modalities                           Moist heat

## 2024-02-22 ENCOUNTER — APPOINTMENT (OUTPATIENT)
Dept: RADIOLOGY | Facility: MEDICAL CENTER | Age: 69
End: 2024-02-22
Payer: MEDICARE

## 2024-02-22 ENCOUNTER — OFFICE VISIT (OUTPATIENT)
Dept: OBGYN CLINIC | Facility: MEDICAL CENTER | Age: 69
End: 2024-02-22
Payer: MEDICARE

## 2024-02-22 VITALS — DIASTOLIC BLOOD PRESSURE: 83 MMHG | WEIGHT: 192.8 LBS | HEART RATE: 71 BPM | SYSTOLIC BLOOD PRESSURE: 126 MMHG

## 2024-02-22 DIAGNOSIS — M18.11 ARTHRITIS OF CARPOMETACARPAL (CMC) JOINT OF RIGHT THUMB: ICD-10-CM

## 2024-02-22 DIAGNOSIS — M79.642 LEFT HAND PAIN: ICD-10-CM

## 2024-02-22 DIAGNOSIS — M79.641 RIGHT HAND PAIN: ICD-10-CM

## 2024-02-22 DIAGNOSIS — M18.12 ARTHRITIS OF CARPOMETACARPAL (CMC) JOINT OF LEFT THUMB: Primary | ICD-10-CM

## 2024-02-22 PROCEDURE — 73130 X-RAY EXAM OF HAND: CPT

## 2024-02-22 PROCEDURE — 99204 OFFICE O/P NEW MOD 45 MIN: CPT | Performed by: ORTHOPAEDIC SURGERY

## 2024-02-22 RX ORDER — AMOXICILLIN 500 MG
CAPSULE ORAL
COMMUNITY

## 2024-02-22 RX ORDER — TAMSULOSIN HYDROCHLORIDE 0.4 MG/1
0.4 CAPSULE ORAL
COMMUNITY

## 2024-02-22 RX ORDER — VIT C/B6/B5/MAGNESIUM/HERB 173 50-5-6-5MG
CAPSULE ORAL
COMMUNITY

## 2024-02-22 RX ORDER — FAMOTIDINE 20 MG
TABLET ORAL
COMMUNITY

## 2024-02-22 RX ORDER — ALLOPURINOL 200 MG/1
TABLET ORAL
COMMUNITY

## 2024-02-22 RX ORDER — LOSARTAN POTASSIUM 25 MG/1
25 TABLET ORAL DAILY
COMMUNITY

## 2024-02-22 NOTE — PROGRESS NOTES
The HAND [facility] UPPER EXTREMITY OFFICE VISIT   Referred By:  Referral Self  No address on file      Chief Complaint:     Bilateral thumb pan    History of Present Illness:   69 y.o., Right hand dominant male presents for initial evaluation of bilateral hands.  He has had symptoms for several years and worse over the past 6 months.  Pain has been intermittent over the years but his right side no longer really bother him that much.  His left side is the most bothersome.  Mostly at the base of his thumb.  Hurts with gripping and pinching activities such as holding steering wheel or holding a golf club.      Today he complains of left > right 1st MP and CMC joint pain.  He denies or numbness.  He will use otc medications sparingly.  He denies physical therapy, bracing, injections or surgery.        ADLs: Community ambulator  Smoke: Denies   ETOH: Denies   Drugs:  Denies  Job: retired       Past Medical History:  Past Medical History:   Diagnosis Date    DVT (deep vein thrombosis) in pregnancy     Enlarged prostate     Gout      Past Surgical History:   Procedure Laterality Date    KNEE ARTHROSCOPY W/ MENISCAL REPAIR Left     SHOULDER SURGERY       No family history on file.  Social History     Socioeconomic History    Marital status: /Civil Union     Spouse name: Not on file    Number of children: Not on file    Years of education: Not on file    Highest education level: Not on file   Occupational History    Not on file   Tobacco Use    Smoking status: Never    Smokeless tobacco: Never   Substance and Sexual Activity    Alcohol use: No    Drug use: No    Sexual activity: Not on file   Other Topics Concern    Not on file   Social History Narrative    Not on file     Social Determinants of Health     Financial Resource Strain: Not on file   Food Insecurity: Not on file   Transportation Needs: Not on file   Physical Activity: Not on file   Stress: Not on file   Social Connections: Not on file   Intimate Partner Violence:  Not on file   Housing Stability: Not on file     Scheduled Meds:  Continuous Infusions:No current facility-administered medications for this visit.    PRN Meds:.  No Known Allergies        Physical Examination:    /83   Pulse 71   Wt 87.5 kg (192 lb 12.8 oz)     Gen: A&Ox3, NAD  Cardiac: regular rate  Chest: non labored breathing  Abdomen: Non-distended    Right Upper Extremity:  Skin CDI  Heberden Nodules of right index and middle DIP with coronal and rotational deformity of distal phalanges  Positive shoulder sign  TTP over thumb CMC  Pos pressure-sheer test  No pain over 1st dorsal compartment  Neg Finklesteins  5/5 thumb abduction  No thumb hyper extension at MP joint  Sensation intact to light touch in the axillary median, ulnar, and radial nerve distributions  2+RP      Left Upper Extremity:  Skin CDI  Heberden's nodules over ring and small DIP with coronal and rotational deformity of distal phalanges  TTP over thumb CMC  Pos pressure sheer test  No pain over 1st dorsal compartment  Neg Finklesteins  5/5 thumb abduction  No thumb hyper extension at thumb MP joint  Sensation intact to light touch in the axillary median, ulnar, and radial nerve distributions  2+RP      Studies:  Radiographs: I personally reviewed and independently interpreted the available radiographs.  2/22/24: Radiographs of the bilateral hands, multiple views, demonstrate severe arthritic changes of the thumb CMC joint with decrease joint space and subchondral sclerosis with large osteophyte formation particularly between the thumb and index metacarpal bases on the right side and moderate thumb CMC arthritic changes on the left side with osteophyte formation and radial subluxation of the thumb metacarpal and the trapezium.  There is decreased joint space primarily radially.       Assessment and Plan:  1. Arthritis of carpometacarpal (CMC) joint of left thumb  Brace      2. Arthritis of carpometacarpal (CMC) joint of right thumb         3. Left hand pain  XR hand 3+ vw left      4. Right hand pain  XR hand 3+ vw right          69 y.o. male presents with signs and symptoms consistent with the above diagnosis.  We discussed the natural history of this condition and its pathogenesis.  We discussed operative and nonoperative treatment options.    He has tried minimal treatments aside from intermittent over-the-counter pain medications.  We discussed bracing versus topical medications versus corticosteroid injections.  He would like to trial bracing and continue to use oral anti-inflammatories as needed.  Left comfort cool brace provided today and patient should use as needed for symptoms.  Recommend using when golfing or other more strenuous gripping activities.  If he does not like comfortable brace he can purchase an alternative brace on his own, I would recommend the push MetaGrip brace as an alternative.      He should follow up as needed if his symptoms fail to improve.      he expressed understanding of the plan and agreed. We encouraged them to contact our office with any questions or concerns.         Tim Iraheta MD  Hand and Upper Extremity Surgery        *This note was dictated using Dragon voice recognition software. Please excuse any word substitutions or errors.*

## 2024-02-27 ENCOUNTER — EVALUATION (OUTPATIENT)
Dept: PHYSICAL THERAPY | Facility: MEDICAL CENTER | Age: 69
End: 2024-02-27
Payer: MEDICARE

## 2024-02-27 DIAGNOSIS — M54.50 ACUTE MIDLINE LOW BACK PAIN WITHOUT SCIATICA: Primary | ICD-10-CM

## 2024-02-27 PROCEDURE — 97161 PT EVAL LOW COMPLEX 20 MIN: CPT | Performed by: PHYSICAL THERAPIST

## 2024-02-27 NOTE — TELEPHONE ENCOUNTER
Additional Information  • Negative: Is this related to a work injury?  • Negative: Is this related to an MVA?  • Negative: Are you currently recieving homecare services?  • Negative: Has the patient had unexplained weight loss?  • Negative: Does the patient have a fever?  • Negative: Is the patient experiencing urine retention?  • Negative: Is the patient experiencing acute drop foot or paralysis?  • Negative: Has the patient experienced major trauma? (fall from height, high speed collision, direct blow to spine) and is also experiencing nausea, light-headedness, or loss of consciousness?  • Negative: Is the patient experiencing blood in sputum?  • Negative: Is this a chronic condition?    Protocols used: [facility] Protocol

## 2024-02-27 NOTE — PROGRESS NOTES
PT Evaluation     Today's date: 2024  Patient name: Randy Salas  : 1955  MRN: [id number]  Referring provider: Felipe Arana MD  Dx:   Encounter Diagnosis     ICD-10-CM    1. Acute midline low back pain without sciatica  M54.50                      Assessment/Plan  Patient is a very pleasant 70 yo male who presents with acute lower back pain following facet sprain/muscle strain 10 days ago.  Patient's symptoms are consistent with severe spasm following subluxation most likely.  Soreness and muscle tenderness following spasm as well as limitation in lumbar motion.  Currently patient is improving with his mobility and his function however is not back to his exercise or desired level of higher function. Patient should improved with gentle return to exercise and function.  Patient was educated today on mechanics and return to exercise.  He was scheduled for another visit to physical therapy however he may not need it.  Patient will follow up next week.     Subjective  Patient states that 10 days ago he was sitting and stood up feeling a shift in his lower back followed by pain that worsened over a couple of hours.  Patient was having severe difficulty with movement and function.  Patient has seen some improvement with returning to his light functional activities however would like to return to exercise as desired for health.      Objective  Red Flags: none  Pain: 3-5/10  Reflexes:     Right Left   Biceps 2+ 2+   Triceps 2+ 2+   Brachioradialis 2+ 2+   Patellar 2+ 2+   Achilles 2+ 2+   Inverted supinator sign neg neg   clonus neg neg   Babinski neg neg        Posture: WFL  AROM: limited with forward flexion of lumbar spine  PROM: full  PAROM: WFL  Palpation: TTP of lumbar paraspinal musculature  Special Tests: no UMN signs, all lumbar testing negative  Coordination of Movement/strengthe: Patient demonstrates poor activation of Transversus Abdominus and multifidus force couple and loss of feed forward  mechanism with reaching tasks.   Patient was able to perform activation with cueing.   Goals:  - Pt I with initial HEP in 1-2 visits  - return to exercise  - improved stabilizing structure activation and improved feed forward mechanism with distal activation  - Increase Functional Status Measure measured by FOTO by DIGNA         Precautions: none

## 2024-02-27 NOTE — LETTER
2024    Felipe Arana MD  [address]  [address] [place], [place] [id number]    Patient: Randy Salas   YOB: 1955   Date of Visit: 2024     Encounter Diagnosis     ICD-10-CM    1. Acute midline low back pain without sciatica  M54.50           Dear Dr. Arana:    Thank you for your recent referral of Randy Salas. Please review the attached evaluation summary from Randy's recent visit.     Please verify that you agree with the plan of care by signing the attached order.     If you have any questions or concerns, please do not hesitate to call.     I sincerely appreciate the opportunity to share in the care of one of your patients and hope to have another opportunity to work with you in the near future.       Sincerely,    Aneudy Alvarado, PT      Referring Provider:      I certify that I have read the below Plan of Care and certify the need for these services furnished under this plan of treatment while under my care.                    Felipe Arana MD  [address]  [address] [place] [id number]  Via Fax: [phone]          PT Evaluation     Today's date: 2024  Patient name: Randy Salas  : 1955  MRN: [id number]  Referring provider: Felipe Arana MD  Dx:   Encounter Diagnosis     ICD-10-CM    1. Acute midline low back pain without sciatica  M54.50                      Assessment/Plan  Patient is a very pleasant 70 yo male who presents with acute lower back pain following facet sprain/muscle strain 10 days ago.  Patient's symptoms are consistent with severe spasm following subluxation most likely.  Soreness and muscle tenderness following spasm as well as limitation in lumbar motion.  Currently patient is improving with his mobility and his function however is not back to his exercise or desired level of higher function. Patient should improved with gentle return to exercise and function.  Patient was educated today on mechanics and  return to exercise.  He was scheduled for another visit to physical therapy however he may not need it.  Patient will follow up next week.     Subjective  Patient states that 10 days ago he was sitting and stood up feeling a shift in his lower back followed by pain that worsened over a couple of hours.  Patient was having severe difficulty with movement and function.  Patient has seen some improvement with returning to his light functional activities however would like to return to exercise as desired for health.      Objective  Red Flags: none  Pain: 3-5/10  Reflexes:     Right Left   Biceps 2+ 2+   Triceps 2+ 2+   Brachioradialis 2+ 2+   Patellar 2+ 2+   Achilles 2+ 2+   Inverted supinator sign neg neg   clonus neg neg   Babinski neg neg        Posture: WFL  AROM: limited with forward flexion of lumbar spine  PROM: full  PAROM: WFL  Palpation: TTP of lumbar paraspinal musculature  Special Tests: no UMN signs, all lumbar testing negative  Coordination of Movement/strengthe: Patient demonstrates poor activation of Transversus Abdominus and multifidus force couple and loss of feed forward mechanism with reaching tasks.   Patient was able to perform activation with cueing.   Goals:  - Pt I with initial HEP in 1-2 visits  - return to exercise  - improved stabilizing structure activation and improved feed forward mechanism with distal activation  - Increase Functional Status Measure measured by FOTO by DIGNA         Precautions: none

## 2024-03-05 ENCOUNTER — OFFICE VISIT (OUTPATIENT)
Dept: PHYSICAL THERAPY | Facility: MEDICAL CENTER | Age: 69
End: 2024-03-05
Payer: MEDICARE

## 2024-03-05 DIAGNOSIS — M54.50 ACUTE MIDLINE LOW BACK PAIN WITHOUT SCIATICA: Primary | ICD-10-CM

## 2024-03-05 PROCEDURE — 97140 MANUAL THERAPY 1/> REGIONS: CPT | Performed by: PHYSICAL THERAPIST

## 2024-03-05 PROCEDURE — 97110 THERAPEUTIC EXERCISES: CPT | Performed by: PHYSICAL THERAPIST

## 2024-03-05 NOTE — PROGRESS NOTES
Daily Note     Today's date: 3/5/2024  Patient name: Randy Salas  : 1955  MRN: [id number]  Referring provider: Felipe Arana MD  Dx:   Encounter Diagnosis     ICD-10-CM    1. Acute midline low back pain without sciatica  M54.50                      Subjective: patient states that he has been doing well however felt a tightness in his lower back on the right side this weekend and felt as though it needed to be worked out.       Objective: See treatment diary below      Assessment: Tolerated treatment well. Patient exhibited good technique with therapeutic exercises and would benefit from continued PT.  Progressed with manual therapy which improved his lower back pain.        Plan: Continue per plan of care.      Precautions: none           Manuals             L3-5 UPA right Gr. Iv 10sec x5 x2            Lumbar rotational mob Gr. Iv 10sec x2                                      Neuro Re-Ed                                                                                                        Ther Ex             LTR on 1/2 roller 2 min            SKTC on 1/2 roller 2 min                                                                                          Ther Activity                                       Gait Training                                       Modalities

## 2024-03-13 ENCOUNTER — OFFICE VISIT (OUTPATIENT)
Dept: PHYSICAL THERAPY | Facility: MEDICAL CENTER | Age: 69
End: 2024-03-13
Payer: MEDICARE

## 2024-03-13 DIAGNOSIS — M54.50 ACUTE MIDLINE LOW BACK PAIN WITHOUT SCIATICA: Primary | ICD-10-CM

## 2024-03-13 PROCEDURE — 97140 MANUAL THERAPY 1/> REGIONS: CPT | Performed by: PHYSICAL THERAPIST

## 2024-03-13 PROCEDURE — 97110 THERAPEUTIC EXERCISES: CPT | Performed by: PHYSICAL THERAPIST

## 2024-03-13 NOTE — PROGRESS NOTES
Daily Note     Today's date: 3/13/2024  Patient name: Randy Salas  : 1955  MRN: [id number]  Referring provider: Felipe Arana MD  Dx:   Encounter Diagnosis     ICD-10-CM    1. Acute midline low back pain without sciatica  M54.50                      Subjective: patient states that he is doing much better. Would like one more visit next week and then maybe ready to take things on his own.       Objective: See treatment diary below      Assessment: Tolerated treatment well. Patient exhibited good technique with therapeutic exercises and would benefit from continued PT.  Progressed with manual therapy which improved his lower back pain.        Plan: Continue per plan of care.      Precautions: none           Manuals             L3-5 UPA right Gr. Iv 10sec x5 x2            Lumbar rotational mob Gr. Iv 10sec x2                                      Neuro Re-Ed                                                                                                        Ther Ex             LTR on 1/ roller 2 min            SKTC on 1/2 roller 2 min                                                                                          Ther Activity                                       Gait Training                                       Modalities

## 2024-03-18 ENCOUNTER — TELEPHONE (OUTPATIENT)
Age: 69
End: 2024-03-18

## 2024-03-18 DIAGNOSIS — M18.12 ARTHRITIS OF CARPOMETACARPAL (CMC) JOINT OF LEFT THUMB: Primary | ICD-10-CM

## 2024-03-18 NOTE — TELEPHONE ENCOUNTER
Caller: Patient    Doctor: Myrtle    Reason for call: patient is requesting a PT script for his hands. He has an appointment scheduled, but they will need a script.     Call back#: [phone]

## 2024-03-21 ENCOUNTER — OFFICE VISIT (OUTPATIENT)
Dept: PHYSICAL THERAPY | Facility: MEDICAL CENTER | Age: 69
End: 2024-03-21
Payer: MEDICARE

## 2024-03-21 DIAGNOSIS — M54.50 ACUTE MIDLINE LOW BACK PAIN WITHOUT SCIATICA: Primary | ICD-10-CM

## 2024-03-21 PROCEDURE — 97110 THERAPEUTIC EXERCISES: CPT | Performed by: PHYSICAL THERAPIST

## 2024-03-21 PROCEDURE — 97140 MANUAL THERAPY 1/> REGIONS: CPT | Performed by: PHYSICAL THERAPIST

## 2024-03-21 NOTE — PROGRESS NOTES
Daily Note     Today's date: 3/21/2024  Patient name: Randy Salas  : 1955  MRN: [id number]  Referring provider: Felipe Arana MD  Dx:   Encounter Diagnosis     ICD-10-CM    1. Acute midline low back pain without sciatica  M54.50                      Subjective: patient states that he is doing much better. Notes that he had a bit more pain moving firewood this weekend but knows how to get the symptoms down when he has them.       Objective: See treatment diary below      Assessment: Tolerated treatment well. Patient exhibited good technique with therapeutic exercises and would benefit from continued PT.  Progressed with manual therapy which improved his lower back pain.  Patient will follow up next week to see how he is feeling and if he needs continued treatment for his lower back.       Plan: Continue per plan of care.      Precautions: none           Manuals             L3-5 UPA right Gr. Iv 10sec x5 x2            Lumbar rotational mob Gr. Iv 10sec x2                                      Neuro Re-Ed                                                                                                        Ther Ex             LTR on 1/2 roller 2 min            SKTC on 1/2 roller 2 min                                                                                          Ther Activity                                       Gait Training                                       Modalities

## 2024-03-27 ENCOUNTER — OFFICE VISIT (OUTPATIENT)
Dept: PHYSICAL THERAPY | Facility: MEDICAL CENTER | Age: 69
End: 2024-03-27
Payer: MEDICARE

## 2024-03-27 DIAGNOSIS — M18.0 OSTEOARTHRITIS OF CARPOMETACARPAL (CMC) JOINT OF BOTH THUMBS: Primary | ICD-10-CM

## 2024-03-27 PROCEDURE — 97161 PT EVAL LOW COMPLEX 20 MIN: CPT | Performed by: PHYSICAL THERAPIST

## 2024-03-27 PROCEDURE — 97110 THERAPEUTIC EXERCISES: CPT | Performed by: PHYSICAL THERAPIST

## 2024-03-27 NOTE — LETTER
2024    Felipe Arana MD  [address]  [address] [place] [id number]    Patient: Randy Salas   YOB: 1955   Date of Visit: 3/27/2024     Encounter Diagnosis     ICD-10-CM    1. Osteoarthritis of carpometacarpal (CMC) joint of both thumbs  M18.0           Dear Dr. Arana:    Thank you for your recent referral of Randy Salas. Please review the attached evaluation summary from Randy's recent visit.     Please verify that you agree with the plan of care by signing the attached order.     If you have any questions or concerns, please do not hesitate to call.     I sincerely appreciate the opportunity to share in the care of one of your patients and hope to have another opportunity to work with you in the near future.       Sincerely,    Violeta Houston, PT      Referring Provider:      I certify that I have read the below Plan of Care and certify the need for these services furnished under this plan of treatment while under my care.                    Felipe Arana MD  [address]  [address] [place] [address]  Via Fax: [phone]          PT Evaluation     Today's date: 3/27/2024  Patient name: Randy Salas  : 1955  MRN: [id number]  Referring provider: Felipe Arana MD  Dx:   Encounter Diagnosis     ICD-10-CM    1. Osteoarthritis of carpometacarpal (CMC) joint of both thumbs  M18.0                      Assessment  Assessment details: Mr. Salas is a 69 y.o. male who presents today for physical therapy evaluation for bilateral thumb pain. No further referral appears necessary at this time based upon examination findings. Patient presents with signs and symptoms consistent with advanced CMC osteoarthritis bilaterally with hypomobility, abnormal muscle firing of dynamic stabilizers of the thumb, decreased  and pinch strength all resulting in decreased tolerance to playing golf, lifting weights, and working in his yard. Patient would  benefit from outpatient physical therapy services to address the observed impairments to better manage symptoms and prolong the need for surgical intervention. Prognosis is fair given chronicity of his symptoms and x-ray findings consistent with physical exam.   Impairments: abnormal muscle firing, abnormal or restricted ROM, activity intolerance, impaired physical strength, lacks appropriate home exercise program and pain with function  Barriers to therapy: Advanced symptoms  Understanding of Dx/Px/POC: good   Prognosis: good    Goals  1. Patient will be independent in individualized HEP.  2. Patient will report decreased symptoms by at least 50%.  3. Patient will improve strength of the dynamic stabilizers of the thumb by 1 grade.  4. Patient will be able to participate in golf with modifications as necessary.   5. Patient will achieve score on FOTO by MDC.       Plan  Patient would benefit from: skilled physical therapy  Planned modality interventions: low level laser therapy and thermotherapy: hydrocollator packs  Planned therapy interventions: joint mobilization, manual therapy, kinesiology taping, massage, neuromuscular re-education, patient education, strengthening, therapeutic exercise, functional ROM exercises, graded exercise and activity modification  Frequency: 1x week  Duration in weeks: 4  Plan of Care beginning date: 3/27/2024  Plan of Care expiration date: 4/26/2024  Treatment plan discussed with: patient      Subjective Evaluation    History of Present Illness  Mechanism of injury: Mr. Salas is a 69 y.o. male who presents today with bilateral thumb pain, L > R. Patient reports gradually progressing symptoms over the past 4-5 months with it becoming progressively more noticeable while swinging his golf club. He did see on our of orthopedic hand specialists and was provided with a comfort cool brace for the left hand and has also purchased a metagrip brace. He finds relief with the metagrip for yard  work but does get discomfort if using this brace when trying to swing his golf club. He is using topical pain relief medications which do provide him with relief. Patient has not tried any injections at this time and wants to try to manage symptoms conservatively.    X-rays: :   (Left) Severe osteoarthritis at the distal interphalangeal joints of all digits bilaterally. No erosions seen.  (Right)Severe osteoarthritis at the distal interphalangeal joints of all digits bilaterally. No erosions seen.      Patient is very active in golf, weightlifting at the gym, and yard work.   Patient's goals are to better manage his symptoms through conservative management through the fall and try to prolong the need for surgery.  Patient Goals  Patient goals for therapy: decreased pain and return to sport/leisure activities    Pain  Current pain rating: 3  At best pain ratin  At worst pain ratin  Pain location: CMC joints bilaterally.  Quality: dull ache and throbbing  Alleviating factors: voltaren.  Exacerbated by: golf, pinching, , lifting weights.  Progression: worsening    Social Support    Employment status: not working  Hand dominance: right    Treatments  Current treatment comments: bracing.       Objective     Observations     Left Wrist/Hand   Positive for Heberden's nodes.     Right Wrist/Hand   Positive for Heberden's nodes.     Additional Observation Details  Bilateral shoulder sign R > L  Bilateral CMC adduction contractures R > L  R thumb zig zag deformity  L SF deformity (old injury)  Rotational deviation of distal phalanges of multiple digits bilaterally    Palpation     Additional Palpation Details  Tenderness and hypertonicity bilateral adductor pollicis    Tenderness     Left Wrist/Hand   Tenderness in the CMC joint. No tenderness in the first dorsal compartment, DIP joint and MCP joint.     Right Wrist/Hand   Tenderness in the CMC joint. No tenderness in the first dorsal compartment, DIP joint and  MCP joint.     Active Range of Motion     Left Wrist   Normal active range of motion  Radial deviation: with pain    Right Wrist   Normal active range of motion  Radial deviation: with pain    Left Thumb   Palmar Abduction     CMC: 40 degrees  Radial abduction    CMC: 35 degrees  Kapandji score: 9 degrees      Right Thumb   Palmar Abduction    CMC: 38  Radial Abduction    CMC: 28  Kapandji score: 9 degrees    Additional Active Range of Motion Details  Composite flexion/extension of digits WNL  Loss of MCP hyperextension bilaterally    Passive Range of Motion     Additional Passive Range of Motion Details  Hypomobility bilateral CMC joints R > L    Strength/Myotome Testing     Left Wrist/Hand      (2nd hand position)     Trial 1: 70    Thumb Strength  Key/Lateral Pinch     Trial 1: 18    Comments: Pain      Right Wrist/Hand      (2nd hand position)     Trial 1: 65    Thumb Strength   Key/Lateral Pinch     Trial 1: 15    Comments: pain    Additional Strength Details  Demonstrates CMC and MCP collapse with lateral pinch bilaterally    Isolated strength  FPL: 5/5 bilaterally  EPL: 5/5 bilaterally   APL: 3+/5 bilaterally  1st dorsal interosseous: 4/5 bilaterally    Tests     Left Wrist/Hand   Positive CMC grind, CMC lever test positive and CMC shoulder sign.     Right Wrist/Hand   Positive CMC grind, CMC lever test positive and CMC shoulder sign.       Flowsheet Rows      Flowsheet Row Most Recent Value   PT/OT G-Codes    Current Score 60   Projected Score 71   FOTO information reviewed Yes   Assessment Type Evaluation               Precautions: n/a    HEP: adductor release, AROM CMC abduction  Manuals 3/27            Adductor pollicis release nv            Gentle distraction and mobilization nv                                      Neuro Re-Ed             CMC stabilization with tennis ball APL nv            Tennis ball lace walks nv            1st DI nv            Tennis ball bunny ears nv                                                    Ther Ex                                                                                                                     Ther Activity                                       Gait Training                                       Modalities             laser nv

## 2024-03-27 NOTE — PROGRESS NOTES
PT Evaluation     Today's date: 3/27/2024  Patient name: Randy Salas  : 1955  MRN: [id number]  Referring provider: Felipe Arana MD  Dx:   Encounter Diagnosis     ICD-10-CM    1. Osteoarthritis of carpometacarpal (CMC) joint of both thumbs  M18.0                      Assessment  Assessment details: Mr. Salas is a 69 y.o. male who presents today for physical therapy evaluation for bilateral thumb pain. No further referral appears necessary at this time based upon examination findings. Patient presents with signs and symptoms consistent with advanced CMC osteoarthritis bilaterally with hypomobility, abnormal muscle firing of dynamic stabilizers of the thumb, decreased  and pinch strength all resulting in decreased tolerance to playing golf, lifting weights, and working in his yard. Patient would benefit from outpatient physical therapy services to address the observed impairments to better manage symptoms and prolong the need for surgical intervention. Prognosis is fair given chronicity of his symptoms and x-ray findings consistent with physical exam.   Impairments: abnormal muscle firing, abnormal or restricted ROM, activity intolerance, impaired physical strength, lacks appropriate home exercise program and pain with function  Barriers to therapy: Advanced symptoms  Understanding of Dx/Px/POC: good   Prognosis: good    Goals  1. Patient will be independent in individualized HEP.  2. Patient will report decreased symptoms by at least 50%.  3. Patient will improve strength of the dynamic stabilizers of the thumb by 1 grade.  4. Patient will be able to participate in golf with modifications as necessary.   5. Patient will achieve score on FOTO by [facility].       Plan  Patient would benefit from: skilled physical therapy  Planned modality interventions: low level laser therapy and thermotherapy: hydrocollator packs  Planned therapy interventions: joint mobilization, manual therapy, kinesiology taping,  massage, neuromuscular re-education, patient education, strengthening, therapeutic exercise, functional ROM exercises, graded exercise and activity modification  Frequency: 1x week  Duration in weeks: 4  Plan of Care beginning date: 3/27/2024  Plan of Care expiration date: 2024  Treatment plan discussed with: patient      Subjective Evaluation    History of Present Illness  Mechanism of injury: Mr. Salas is a 69 y.o. male who presents today with bilateral thumb pain, L > R. Patient reports gradually progressing symptoms over the past 4-5 months with it becoming progressively more noticeable while swinging his golf club. He did see on our of orthopedic hand specialists and was provided with a comfort cool brace for the left hand and has also purchased a metagrip brace. He finds relief with the metagrip for yard work but does get discomfort if using this brace when trying to swing his golf club. He is using topical pain relief medications which do provide him with relief. Patient has not tried any injections at this time and wants to try to manage symptoms conservatively.    X-rays: :   (Left) Severe osteoarthritis at the distal interphalangeal joints of all digits bilaterally. No erosions seen.  (Right)Severe osteoarthritis at the distal interphalangeal joints of all digits bilaterally. No erosions seen.      Patient is very active in golf, weightlifting at the gym, and yard work.   Patient's goals are to better manage his symptoms through conservative management through the fall and try to prolong the need for surgery.  Patient Goals  Patient goals for therapy: decreased pain and return to sport/leisure activities    Pain  Current pain rating: 3  At best pain ratin  At worst pain ratin  Pain location: CMC joints bilaterally.  Quality: dull ache and throbbing  Alleviating factors: voltaren.  Exacerbated by: golf, pinching, , lifting weights.  Progression: worsening    Social Support    Employment  status: not working  Hand dominance: right    Treatments  Current treatment comments: bracing.       Objective     Observations     Left Wrist/Hand   Positive for Heberden's nodes.     Right Wrist/Hand   Positive for Heberden's nodes.     Additional Observation Details  Bilateral shoulder sign R > L  Bilateral CMC adduction contractures R > L  R thumb zig zag deformity  L SF deformity (old injury)  Rotational deviation of distal phalanges of multiple digits bilaterally    Palpation     Additional Palpation Details  Tenderness and hypertonicity bilateral adductor pollicis    Tenderness     Left Wrist/Hand   Tenderness in the CMC joint. No tenderness in the first dorsal compartment, DIP joint and MCP joint.     Right Wrist/Hand   Tenderness in the CMC joint. No tenderness in the first dorsal compartment, DIP joint and MCP joint.     Active Range of Motion     Left Wrist   Normal active range of motion  Radial deviation: with pain    Right Wrist   Normal active range of motion  Radial deviation: with pain    Left Thumb   Palmar Abduction     CMC: 40 degrees  Radial abduction    CMC: 35 degrees  Kapandji score: 9 degrees      Right Thumb   Palmar Abduction    CMC: 38  Radial Abduction    CMC: 28  Kapandji score: 9 degrees    Additional Active Range of Motion Details  Composite flexion/extension of digits WNL  Loss of MCP hyperextension bilaterally    Passive Range of Motion     Additional Passive Range of Motion Details  Hypomobility bilateral CMC joints R > L    Strength/Myotome Testing     Left Wrist/Hand      (2nd hand position)     Trial 1: 70    Thumb Strength  Key/Lateral Pinch     Trial 1: 18    Comments: Pain      Right Wrist/Hand      (2nd hand position)     Trial 1: 65    Thumb Strength   Key/Lateral Pinch     Trial 1: 15    Comments: pain    Additional Strength Details  Demonstrates CMC and MCP collapse with lateral pinch bilaterally    Isolated strength  FPL: 5/5 bilaterally  EPL: 5/5 bilaterally    APL: 3+/5 bilaterally  1st dorsal interosseous: 4/5 bilaterally    Tests     Left Wrist/Hand   Positive CMC grind, CMC lever test positive and CMC shoulder sign.     Right Wrist/Hand   Positive CMC grind, CMC lever test positive and CMC shoulder sign.       Flowsheet Rows      Flowsheet Row Most Recent Value   PT/OT G-Codes    Current Score 60   Projected Score 71   FOTO information reviewed Yes   Assessment Type Evaluation               Precautions: n/a    HEP: adductor release, AROM CMC abduction  Manuals 3/27            Adductor pollicis release nv            Gentle distraction and mobilization nv                                      Neuro Re-Ed             CMC stabilization with tennis ball APL nv            Tennis ball lace walks nv            1st DI nv            Tennis ball bunny ears nv                                                   Ther Ex                                                                                                                     Ther Activity                                       Gait Training                                       Modalities             laser nv

## 2024-03-27 NOTE — LETTER
2024      No Recipients    Patient: Randy Salas   YOB: 1955   Date of Visit: 3/27/2024     Encounter Diagnosis     ICD-10-CM    1. Osteoarthritis of carpometacarpal (CMC) joint of both thumbs  M18.0           Dear Dr. Anton Recipients:    Thank you for your recent referral of Randy Salas. Please review the attached evaluation summary from Randy's recent visit.     Please verify that you agree with the plan of care by signing the attached order.     If you have any questions or concerns, please do not hesitate to call.     I sincerely appreciate the opportunity to share in the care of one of your patients and hope to have another opportunity to work with you in the near future.       Sincerely,    Violeta Houston, PT      Referring Provider:      I certify that I have read the below Plan of Care and certify the need for these services furnished under this plan of treatment while under my care.                      No Recipients          PT Evaluation     Today's date: 3/27/2024  Patient name: Randy Salas  : 1955  MRN: [id number]  Referring provider: Felipe Aarna MD  Dx:   Encounter Diagnosis     ICD-10-CM    1. Osteoarthritis of carpometacarpal (CMC) joint of both thumbs  M18.0                      Assessment  Assessment details: Mr. Salas is a 69 y.o. male who presents today for physical therapy evaluation for bilateral thumb pain. No further referral appears necessary at this time based upon examination findings. Patient presents with signs and symptoms consistent with advanced CMC osteoarthritis bilaterally with hypomobility, abnormal muscle firing of dynamic stabilizers of the thumb, decreased  and pinch strength all resulting in decreased tolerance to playing golf, lifting weights, and working in his yard. Patient would benefit from outpatient physical therapy services to address the observed impairments to better manage symptoms and prolong the need for surgical  intervention. Prognosis is fair given chronicity of his symptoms and x-ray findings consistent with physical exam.   Impairments: abnormal muscle firing, abnormal or restricted ROM, activity intolerance, impaired physical strength, lacks appropriate home exercise program and pain with function  Barriers to therapy: Advanced symptoms  Understanding of Dx/Px/POC: good   Prognosis: good    Goals  1. Patient will be independent in individualized HEP.  2. Patient will report decreased symptoms by at least 50%.  3. Patient will improve strength of the dynamic stabilizers of the thumb by 1 grade.  4. Patient will be able to participate in golf with modifications as necessary.   5. Patient will achieve score on FOTO by [facility].       Plan  Patient would benefit from: skilled physical therapy  Planned modality interventions: low level laser therapy and thermotherapy: hydrocollator packs  Planned therapy interventions: joint mobilization, manual therapy, kinesiology taping, massage, neuromuscular re-education, patient education, strengthening, therapeutic exercise, functional ROM exercises, graded exercise and activity modification  Frequency: 1x week  Duration in weeks: 4  Plan of Care beginning date: 3/27/2024  Plan of Care expiration date: 4/26/2024  Treatment plan discussed with: patient      Subjective Evaluation    History of Present Illness  Mechanism of injury: Mr. Salas is a 69 y.o. male who presents today with bilateral thumb pain, L > R. Patient reports gradually progressing symptoms over the past 4-5 months with it becoming progressively more noticeable while swinging his golf club. He did see on our of orthopedic hand specialists and was provided with a comfort cool brace for the left hand and has also purchased a metagrip brace. He finds relief with the metagrip for yard work but does get discomfort if using this brace when trying to swing his golf club. He is using topical pain relief medications which do provide  him with relief. Patient has not tried any injections at this time and wants to try to manage symptoms conservatively.    X-rays: :   (Left) Severe osteoarthritis at the distal interphalangeal joints of all digits bilaterally. No erosions seen.  (Right)Severe osteoarthritis at the distal interphalangeal joints of all digits bilaterally. No erosions seen.      Patient is very active in golf, weightlifting at the gym, and yard work.   Patient's goals are to better manage his symptoms through conservative management through the fall and try to prolong the need for surgery.  Patient Goals  Patient goals for therapy: decreased pain and return to sport/leisure activities    Pain  Current pain rating: 3  At best pain ratin  At worst pain ratin  Pain location: CMC joints bilaterally.  Quality: dull ache and throbbing  Alleviating factors: voltaren.  Exacerbated by: golf, pinching, , lifting weights.  Progression: worsening    Social Support    Employment status: not working  Hand dominance: right    Treatments  Current treatment comments: bracing.       Objective     Observations     Left Wrist/Hand   Positive for Heberden's nodes.     Right Wrist/Hand   Positive for Heberden's nodes.     Additional Observation Details  Bilateral shoulder sign R > L  Bilateral CMC adduction contractures R > L  R thumb zig zag deformity  L SF deformity (old injury)  Rotational deviation of distal phalanges of multiple digits bilaterally    Palpation     Additional Palpation Details  Tenderness and hypertonicity bilateral adductor pollicis    Tenderness     Left Wrist/Hand   Tenderness in the CMC joint. No tenderness in the first dorsal compartment, DIP joint and MCP joint.     Right Wrist/Hand   Tenderness in the CMC joint. No tenderness in the first dorsal compartment, DIP joint and MCP joint.     Active Range of Motion     Left Wrist   Normal active range of motion  Radial deviation: with pain    Right Wrist   Normal active  range of motion  Radial deviation: with pain    Left Thumb   Palmar Abduction     CMC: 40 degrees  Radial abduction    CMC: 35 degrees  Kapandji score: 9 degrees      Right Thumb   Palmar Abduction    CMC: 38  Radial Abduction    CMC: 28  Kapandji score: 9 degrees    Additional Active Range of Motion Details  Composite flexion/extension of digits WNL  Loss of MCP hyperextension bilaterally    Passive Range of Motion     Additional Passive Range of Motion Details  Hypomobility bilateral CMC joints R > L    Strength/Myotome Testing     Left Wrist/Hand      (2nd hand position)     Trial 1: 70    Thumb Strength  Key/Lateral Pinch     Trial 1: 18    Comments: Pain      Right Wrist/Hand      (2nd hand position)     Trial 1: 65    Thumb Strength   Key/Lateral Pinch     Trial 1: 15    Comments: pain    Additional Strength Details  Demonstrates CMC and MCP collapse with lateral pinch bilaterally    Isolated strength  FPL: 5/5 bilaterally  EPL: 5/5 bilaterally   APL: 3+/5 bilaterally  1st dorsal interosseous: 4/5 bilaterally    Tests     Left Wrist/Hand   Positive CMC grind, CMC lever test positive and CMC shoulder sign.     Right Wrist/Hand   Positive CMC grind, CMC lever test positive and CMC shoulder sign.       Flowsheet Rows      Flowsheet Row Most Recent Value   PT/OT G-Codes    Current Score 60   Projected Score 71   FOTO information reviewed Yes   Assessment Type Evaluation               Precautions: n/a    HEP: adductor release, AROM CMC abduction  Manuals 3/27            Adductor pollicis release nv            Gentle distraction and mobilization nv                                      Neuro Re-Ed             CMC stabilization with tennis ball APL nv            Tennis ball lace walks nv            1st DI nv            Tennis ball bunny ears nv                                                   Ther Ex                                                                                                                      Ther Activity                                       Gait Training                                       Modalities             laser nv

## 2024-04-02 ENCOUNTER — OFFICE VISIT (OUTPATIENT)
Dept: PHYSICAL THERAPY | Facility: MEDICAL CENTER | Age: 69
End: 2024-04-02
Payer: MEDICARE

## 2024-04-02 DIAGNOSIS — M18.0 OSTEOARTHRITIS OF CARPOMETACARPAL (CMC) JOINT OF BOTH THUMBS: Primary | ICD-10-CM

## 2024-04-02 PROCEDURE — 97140 MANUAL THERAPY 1/> REGIONS: CPT | Performed by: PHYSICAL THERAPIST

## 2024-04-02 PROCEDURE — 97112 NEUROMUSCULAR REEDUCATION: CPT | Performed by: PHYSICAL THERAPIST

## 2024-04-02 NOTE — PROGRESS NOTES
Daily Note     Today's date: 2024  Patient name: Randy Salas  : 1955  MRN: [id number]  Referring provider: Felipe Arana MD  Dx:   Encounter Diagnosis     ICD-10-CM    1. Osteoarthritis of carpometacarpal (CMC) joint of both thumbs  M18.0                      Subjective: Patient reports pain with clipping branches, specifically those increased in size.       Objective: See treatment diary below      Assessment: Today was patients first treatment session back since initial evaluation. Laser therapy performed for symptom modulation. Patient has significant CMC hypomobility on the R compared to the L. Mobility improved bilaterally with manual adductor release and gentle mobilizations. Patient reported decreased pain and improved mobility post manual interventions. Followed up with dynamic stabilization with frequent cueing provided. He can add in tennis ball place holds to home program. Monitor response nv.       Plan: Continue per plan of care.  Progress treatment as tolerated.       Precautions: n/a    HEP: adductor release, AROM CMC abduction  Manuals 3/27 4/2           Adductor pollicis release nv CK           Gentle distraction and mobilization nv CK                                     Neuro Re-Ed             CMC stabilization with tennis ball place holds nv x2'           Tennis ball lace walks nv X1' ea           1st DI nv x2'           Tennis ball bunny ears nv X20 ea                                                  Ther Ex                                                                                                                     Ther Activity                                       Gait Training                                       Modalities             laser nv x10'

## 2024-04-11 ENCOUNTER — APPOINTMENT (OUTPATIENT)
Dept: PHYSICAL THERAPY | Facility: MEDICAL CENTER | Age: 69
End: 2024-04-11
Payer: MEDICARE

## 2024-04-18 ENCOUNTER — OFFICE VISIT (OUTPATIENT)
Dept: PHYSICAL THERAPY | Facility: MEDICAL CENTER | Age: 69
End: 2024-04-18
Payer: MEDICARE

## 2024-04-18 DIAGNOSIS — M18.0 OSTEOARTHRITIS OF CARPOMETACARPAL (CMC) JOINT OF BOTH THUMBS: Primary | ICD-10-CM

## 2024-04-18 PROCEDURE — 97140 MANUAL THERAPY 1/> REGIONS: CPT | Performed by: PHYSICAL THERAPIST

## 2024-04-18 PROCEDURE — 97760 ORTHOTIC MGMT&TRAING 1ST ENC: CPT | Performed by: PHYSICAL THERAPIST

## 2024-04-18 NOTE — PROGRESS NOTES
Daily Note     Today's date: 2024  Patient name: Randy Salas  : 1955  MRN: [id number]  Referring provider: Felipe Arana MD  Dx:   Encounter Diagnosis     ICD-10-CM    1. Osteoarthritis of carpometacarpal (CMC) joint of both thumbs  M18.0                      Subjective: Patient states KT is working well for golfing for the left thumb. He states he was doing yard work yesterday and with trimming and pushing the mower his right thumb was really bothering him.       Objective: See treatment diary below      Assessment: continued with laser for symptom modulation. He has very limited CMC mobility on the right. Fabricated a hand based thumb spica to help with position and reducing joint inflammation for him to use with activity as needed and if he is having increased pain after activity. Will continue with previous HEP. Left CMC mobility improving and symptoms are managed with comfort cool, metagrip, and taping as needed.       Plan: Continue per plan of care.  Progress treatment as tolerated.       Precautions: n/a    HEP: adductor release, AROM CMC abduction  Manuals 3/27 4/2 4/18          Adductor pollicis release nv CK CK          Gentle distraction and mobilization nv CK CK                                    Orthoses             Thumb spica hand based R   x15'                                    Neuro Re-Ed             CMC stabilization with tennis ball place holds nv x2'           Tennis ball lace walks nv X1' ea           1st DI nv x2'           Tennis ball bunny ears nv X20 ea                                                  Ther Ex                                                                                                                     Ther Activity                                       Gait Training                                       Modalities             laser nv x10' x10'

## 2024-04-25 ENCOUNTER — OFFICE VISIT (OUTPATIENT)
Dept: PHYSICAL THERAPY | Facility: MEDICAL CENTER | Age: 69
End: 2024-04-25
Payer: MEDICARE

## 2024-04-25 DIAGNOSIS — M18.0 OSTEOARTHRITIS OF CARPOMETACARPAL (CMC) JOINT OF BOTH THUMBS: Primary | ICD-10-CM

## 2024-04-25 PROCEDURE — 97140 MANUAL THERAPY 1/> REGIONS: CPT | Performed by: PHYSICAL THERAPIST

## 2024-04-25 PROCEDURE — 97112 NEUROMUSCULAR REEDUCATION: CPT | Performed by: PHYSICAL THERAPIST

## 2024-04-25 NOTE — PROGRESS NOTES
Daily Note     Today's date: 2024  Patient name: Randy Salas  : 1955  MRN: [id number]  Referring provider: Felipe Arana MD  Dx:   Encounter Diagnosis     ICD-10-CM    1. Osteoarthritis of carpometacarpal (CMC) joint of both thumbs  M18.0                      Subjective: Patient states left thumb is manageable he is seeing slow improvements in pain and function. He states the right thumb was more manageable with thumb spica splint which allowed up to mow the grass but he does need some adjustments on it.       Objective: See treatment diary below      Assessment: Continued with laser for symptom modulation. He has very limited CMC mobility on the right with mild improvements seen in web space mobility. He is getting some relief and improved function with hand based spica. Adjustments made to reduce pressure from splint with activities requiring increased wrist extension. Left CMC mobility and motor control improved. Symptoms being managed with taping, bracing, and dynamic stabilization. Reviewed HEP in which patient is competent with all exercises.       Plan: Follow up in 2 weeks to reassess symptoms.      Precautions: n/a    HEP: adductor release, AROM CMC abduction  Manuals 3/27 4/2 4/18 4/25         Adductor pollicis release nv CK CK CK         Gentle distraction and mobilization nv CK CK CK                                   Orthoses             Thumb spica hand based R   x15' Adjustment x5'                                   Neuro Re-Ed             CMC stabilization with tennis ball place holds nv x2'  x2'         Tennis ball lace walks nv X1' ea  X1' ea         1st DI nv x2'  x2'         Tennis ball bunny ears nv X20 ea  X30 ea                                                Ther Ex                                                                                                                     Ther Activity                                       Gait Training                                        Modalities             laser nv x10' x10' x10'

## 2024-05-09 ENCOUNTER — APPOINTMENT (OUTPATIENT)
Dept: PHYSICAL THERAPY | Facility: MEDICAL CENTER | Age: 69
End: 2024-05-09
Payer: MEDICARE

## 2024-05-15 ENCOUNTER — OFFICE VISIT (OUTPATIENT)
Dept: PHYSICAL THERAPY | Facility: MEDICAL CENTER | Age: 69
End: 2024-05-15
Payer: MEDICARE

## 2024-05-15 DIAGNOSIS — M18.0 OSTEOARTHRITIS OF CARPOMETACARPAL (CMC) JOINT OF BOTH THUMBS: Primary | ICD-10-CM

## 2024-05-15 PROCEDURE — 97140 MANUAL THERAPY 1/> REGIONS: CPT | Performed by: PHYSICAL THERAPIST

## 2024-05-15 NOTE — PROGRESS NOTES
Daily Note/Discharge Summary     Today's date: 5/15/2024  Patient name: Randy Salas  : 1955  MRN: [id number]  Referring provider: Felipe Arana MD  Dx:   Encounter Diagnosis     ICD-10-CM    1. Osteoarthritis of carpometacarpal (CMC) joint of both thumbs  M18.0                      Subjective: Randy states his is performing his exercises regularly. He states he is managing his bilateral thumb pain fairly well. He is able to mow his grass with use of his custom CMC splint. He is able to golf with manageable symptoms with KT. He plans to follow up with hand specialist to develop a plan moving forward.       Objective: See treatment diary below      Assessment: Continued with laser for symptom modulation. He has very limited CMC mobility on the right with mild improvements seen in web space mobility. Seeing more improvements in CMC mobility on left which is the less involved side. He has decreased pain after manual therapy interventions and laser therapy. Extensive education provided on returning for splinting modifications as needed and overall management of symptoms long term.       Plan:  DC with HEP and splinting. Encouraged to follow up in the future as needed.      Precautions: n/a    HEP: adductor release, AROM CMC abduction  Manuals 3/27 4/2 4/18 4/25 5/15        Adductor pollicis release nv CK CK CK CK        Gentle distraction and mobilization nv CK CK CK CK                                  Orthoses             Thumb spica hand based R   x15' Adjustment x5'                                   Neuro Re-Ed             CMC stabilization with tennis ball place holds nv x2'  x2'         Tennis ball lace walks nv X1' ea  X1' ea         1st DI nv x2'  x2'         Tennis ball bunny ears nv X20 ea  X30 ea                                                Ther Ex                                                                                                                     Ther Activity                                        Gait Training                                       Modalities             laser nv x10' x10' x10' x10'

## 2024-07-18 ENCOUNTER — OFFICE VISIT (OUTPATIENT)
Dept: PHYSICAL THERAPY | Facility: MEDICAL CENTER | Age: 69
End: 2024-07-18
Payer: MEDICARE

## 2024-07-18 DIAGNOSIS — M25.562 LEFT KNEE PAIN, UNSPECIFIED CHRONICITY: Primary | ICD-10-CM

## 2024-07-18 PROCEDURE — 97161 PT EVAL LOW COMPLEX 20 MIN: CPT | Performed by: PHYSICAL THERAPIST

## 2024-07-18 PROCEDURE — 97140 MANUAL THERAPY 1/> REGIONS: CPT | Performed by: PHYSICAL THERAPIST

## 2024-07-18 PROCEDURE — 97110 THERAPEUTIC EXERCISES: CPT | Performed by: PHYSICAL THERAPIST

## 2024-07-18 NOTE — PROGRESS NOTES
PT Evaluation     Today's date: 2024  Patient name: Randy Salas  : 1955  MRN: [id number]  Referring provider: Felipe Arana MD  Dx:   Encounter Diagnosis     ICD-10-CM    1. Left knee pain, unspecified chronicity  M25.562                      Assessment    Assessment details: Pt is a pleasant 70 yo male presenting to physical therapy with R knee pain.  Pt would benefit from skilled PT to address current impairments and return pt to pre-morbid function  Understanding of Dx/Px/POC: good     Prognosis: good    Goals  Impairment Goals  - Pt I with initial HEP in 1-2 visits  - Improve segmental trunk rotation to functional in 4-6 weeks  - Increase strength to 5/5 in all affected areas in 4-6 weeks    Functional Goals  - Increase FOTO to at least 73 in 6-8 weeks  - Patient will be independent with comprehensive HEP in 6-8 weeks  - Patient will be able to return to pre-morbid activity level with min to no difficulty or discomfort in 6-8 weeks     Plan  Patient would benefit from: skilled physical therapy  Other planned modality interventions: Modalities prn    Planned therapy interventions: manual therapy, neuromuscular re-education, postural training, strengthening, stretching, therapeutic activities, therapeutic exercise and home exercise program    Frequency: 2x week  Duration in weeks: 8  Treatment plan discussed with: patient        Subjective Evaluation    History of Present Illness  Date of onset: 6/15/2024  Mechanism of injury: Pt reports that when he was mowing the lawn he felt pain in his L knee.  He felt pretty good the next day, but after playing golf the next day he was limping.  His pain resolved after a few days, but then it bothered him again after mowing the lawn. Pt didn't play golf for 3 weeks and then got an injection.  Pt has been feeling good since the injection and has been able to golf twice.  He is still having some tweaks here and there, but is overall better than he was mid .  " His pain is random at this point.    Patient Goals  Patient goals for therapy: decreased pain  Patient goal: stay active, avoid surgery  Pain  Current pain rating: 3  At best pain ratin  At worst pain ratin    Social Support    Employment status: not working  Exercise history: walking on TM, elliptical, weight lifting, golf      Diagnostic Tests  Abnormal x-ray: + arthritic changes.  Treatments  Previous treatment: injection treatment    Objective     Observations     Additional Observation Details  Gait is unremarkable    Functional squat to 90 degrees, no significant weight shift to note     Segmental trunk flexion and extension are functional    Segmental trunk rotation is dysfunctional    + LLD R > L     Neurological Testing     Sensation     Knee   Left Knee   Intact: Light touch    Right Knee   Intact: light touch     Active Range of Motion   Left Knee   Normal active range of motion    Right Knee   Normal active range of motion    Strength/Myotome Testing     Left Hip   Planes of Motion   Flexion: 3+  Extension: 3+  Abduction: 3+  Adduction: 3+    Isolated Muscles   Gluteus deepak: 3+    Right Hip   Planes of Motion   Flexion: 5  Extension: 5  Abduction: 5  Adduction: 5    Isolated Muscles   Gluteus maximums: 5    Left Knee   Prone flexion: 5  Extension: 5    Right Knee   Prone flexion: 5  Extension: 5             Precautions: None       Manuals             Long axis traction L LE HK                                                   Ther Ex             Bike NV            Bridging  x30            T-band abd Blk  x30            Add sq 5\"  x30            Heel pushes 5\"  x30                                                                                                                                                                                                                                                                                     "

## 2024-07-18 NOTE — LETTER
2024    Lillie Matson DO  [address] [facility] [address]    Patient: Randy Salas   YOB: 1955   Date of Visit: 2024     Encounter Diagnosis     ICD-10-CM    1. Left knee pain, unspecified chronicity  M25.562           Dear Dr. Matson:    Thank you for your recent referral of Randy Salas. Please review the attached evaluation summary from Randy's recent visit.     Please verify that you agree with the plan of care by signing the attached order.     If you have any questions or concerns, please do not hesitate to call.     I sincerely appreciate the opportunity to share in the care of one of your patients and hope to have another opportunity to work with you in the near future.       Sincerely,    Renzo Bethea, PT      Referring Provider:      I certify that I have read the below Plan of Care and certify the need for these services furnished under this plan of treatment while under my care.                    Lillie Matson DO  [address] [facility] [address]  Via Fax: [phone]          PT Evaluation     Today's date: 2024  Patient name: Randy Salas  : 1955  MRN: [id number]  Referring provider: Felipe Arana MD  Dx:   Encounter Diagnosis     ICD-10-CM    1. Left knee pain, unspecified chronicity  M25.562                      Assessment    Assessment details: Pt is a pleasant 70 yo male presenting to physical therapy with R knee pain.  Pt would benefit from skilled PT to address current impairments and return pt to pre-morbid function  Understanding of Dx/Px/POC: good     Prognosis: good    Goals  Impairment Goals  - Pt I with initial HEP in 1-2 visits  - Improve segmental trunk rotation to functional in 4-6 weeks  - Increase strength to 5/5 in all affected areas in 4-6 weeks    Functional Goals  - Increase FOTO to at least 73 in 6-8 weeks  - Patient will be independent with comprehensive HEP in 6-8 weeks  - Patient will be able to return to pre-morbid  activity level with min to no difficulty or discomfort in 6-8 weeks     Plan  Patient would benefit from: skilled physical therapy  Other planned modality interventions: Modalities prn    Planned therapy interventions: manual therapy, neuromuscular re-education, postural training, strengthening, stretching, therapeutic activities, therapeutic exercise and home exercise program    Frequency: 2x week  Duration in weeks: 8  Treatment plan discussed with: patient        Subjective Evaluation    History of Present Illness  Date of onset: 6/15/2024  Mechanism of injury: Pt reports that when he was mowing the lawn he felt pain in his L knee.  He felt pretty good the next day, but after playing golf the next day he was limping.  His pain resolved after a few days, but then it bothered him again after mowing the lawn. Pt didn't play golf for 3 weeks and then got an injection.  Pt has been feeling good since the injection and has been able to golf twice.  He is still having some tweaks here and there, but is overall better than he was mid .  His pain is random at this point.    Patient Goals  Patient goals for therapy: decreased pain  Patient goal: stay active, avoid surgery  Pain  Current pain rating: 3  At best pain ratin  At worst pain ratin    Social Support    Employment status: not working  Exercise history: walking on TM, elliptical, weight lifting, golf      Diagnostic Tests  Abnormal x-ray: + arthritic changes.  Treatments  Previous treatment: injection treatment    Objective     Observations     Additional Observation Details  Gait is unremarkable    Functional squat to 90 degrees, no significant weight shift to note     Segmental trunk flexion and extension are functional    Segmental trunk rotation is dysfunctional    + LLD R > L     Neurological Testing     Sensation     Knee   Left Knee   Intact: Light touch    Right Knee   Intact: light touch     Active Range of Motion   Left Knee   Normal active  "range of motion    Right Knee   Normal active range of motion    Strength/Myotome Testing     Left Hip   Planes of Motion   Flexion: 3+  Extension: 3+  Abduction: 3+  Adduction: 3+    Isolated Muscles   Gluteus deepak: 3+    Right Hip   Planes of Motion   Flexion: 5  Extension: 5  Abduction: 5  Adduction: 5    Isolated Muscles   Gluteus maximums: 5    Left Knee   Prone flexion: 5  Extension: 5    Right Knee   Prone flexion: 5  Extension: 5             Precautions: None       Manuals 7/18            Long axis traction L LE HK                                                   Ther Ex 7/18            Bike NV            Bridging  x30            T-band abd Blk  x30            Add sq 5\"  x30            Heel pushes 5\"  x30                                                                                                                                                                                                                                                                                                     "

## 2024-07-29 ENCOUNTER — OFFICE VISIT (OUTPATIENT)
Dept: PHYSICAL THERAPY | Facility: MEDICAL CENTER | Age: 69
End: 2024-07-29
Payer: MEDICARE

## 2024-07-29 DIAGNOSIS — M25.562 LEFT KNEE PAIN, UNSPECIFIED CHRONICITY: Primary | ICD-10-CM

## 2024-07-29 PROCEDURE — 97112 NEUROMUSCULAR REEDUCATION: CPT

## 2024-07-29 PROCEDURE — 97110 THERAPEUTIC EXERCISES: CPT

## 2024-07-29 NOTE — PROGRESS NOTES
"Daily Note     Today's date: 2024  Patient name: Randy Salas  : 1955  MRN: [id number]  Referring provider: Felipe Arana MD  Dx:   Encounter Diagnosis     ICD-10-CM    1. Left knee pain, unspecified chronicity  M25.562                      Subjective: Pt reports that he felt better after IE and has been performing his hep while on vacation last week.       Objective: See treatment diary below      Assessment: Tolerated treatment well. Patient demonstrated fatigue post treatment, exhibited good technique with therapeutic exercises, and would benefit from continued PT  Pt is responding well to current tx plan.       Plan: Continue per plan of care.      Precautions: None       Manuals            Long axis traction L LE HK KO                                                  Ther Ex            Bike NV 10 min           Bridging  x30 x30           T-band abd Blk  x30 Blk  x30           Add sq 5\"  x30 5\"  x30           Heel pushes 5\"  x30 5\"  x30           Hip IR  Blk  x30           Standing hip abd  3x10                                                                                                                                                                                                                                                          "

## 2024-08-01 ENCOUNTER — OFFICE VISIT (OUTPATIENT)
Dept: PHYSICAL THERAPY | Facility: MEDICAL CENTER | Age: 69
End: 2024-08-01
Payer: MEDICARE

## 2024-08-01 DIAGNOSIS — M25.562 LEFT KNEE PAIN, UNSPECIFIED CHRONICITY: Primary | ICD-10-CM

## 2024-08-01 PROCEDURE — 97110 THERAPEUTIC EXERCISES: CPT

## 2024-08-01 PROCEDURE — 97112 NEUROMUSCULAR REEDUCATION: CPT

## 2024-08-01 NOTE — PROGRESS NOTES
"Daily Note     Today's date: 2024  Patient name: Randy Salas  : 1955  MRN: [id number]  Referring provider: Felipe Arana MD  Dx:   Encounter Diagnosis     ICD-10-CM    1. Left knee pain, unspecified chronicity  M25.562                      Subjective: Pt reports that he is feeling good overall. Notes that he has been golfing a lot.      Objective: See treatment diary below      Assessment: Tolerated treatment well. Patient demonstrated fatigue post treatment, exhibited good technique with therapeutic exercises, and would benefit from continued PT  Pt is responding well to current tx plan.       Plan: Continue per plan of care.      Precautions: None       Manuals           Long axis traction L LE HK KO MM                                                 Ther Ex           Bike NV 10 min 10 min          Bridging  x30 x30 x30          T-band abd Blk  x30 Blk  x30 Blk x30          Add sq 5\"  x30 5\"  x30 5\"x30          Heel pushes 5\"  x30 5\"  x30 5\" x30          Hip IR  Blk  x30 Blk x30          Standing hip abd  3x10 3x10                                                                                                                                                                                                                                                         "

## 2024-08-05 ENCOUNTER — OFFICE VISIT (OUTPATIENT)
Dept: PHYSICAL THERAPY | Facility: MEDICAL CENTER | Age: 69
End: 2024-08-05
Payer: MEDICARE

## 2024-08-05 DIAGNOSIS — M25.562 LEFT KNEE PAIN, UNSPECIFIED CHRONICITY: Primary | ICD-10-CM

## 2024-08-05 PROCEDURE — 97112 NEUROMUSCULAR REEDUCATION: CPT

## 2024-08-05 PROCEDURE — 97110 THERAPEUTIC EXERCISES: CPT

## 2024-08-05 NOTE — PROGRESS NOTES
"Daily Note     Today's date: 2024  Patient name: Randy Salas  : 1955  MRN: [id number]  Referring provider: Felipe Arana MD  Dx:   Encounter Diagnosis     ICD-10-CM    1. Left knee pain, unspecified chronicity  M25.562                      Subjective: Pt reports that his knee is feeling better and was able to golf without pain.       Objective: See treatment diary below      Assessment: Tolerated treatment well. Patient demonstrated fatigue post treatment, exhibited good technique with therapeutic exercises, and would benefit from continued PT  Pt is responding well to current tx plan.       Plan: Continue per plan of care.      Precautions: None       Manuals          Long axis traction L LE HK KO MM KO                                                Ther Ex          Bike NV 10 min 10 min 10 min         Bridging  x30 x30 x30 x30         T-band abd Blk  x30 Blk  x30 Blk x30 Blk  x30         Add sq 5\"  x30 5\"  x30 5\"x30 5\"  x30         Heel pushes 5\"  x30 5\"  x30 5\" x30 5\"  x30         Hip IR  Blk  x30 Blk x30 Blk  x30         Standing hip abd  3x10 3x10 3x10         Ab bracing    5\"  3x10                                                                                                                                                                                                                                             "

## 2024-08-08 ENCOUNTER — OFFICE VISIT (OUTPATIENT)
Dept: PHYSICAL THERAPY | Facility: MEDICAL CENTER | Age: 69
End: 2024-08-08
Payer: MEDICARE

## 2024-08-08 DIAGNOSIS — M25.562 LEFT KNEE PAIN, UNSPECIFIED CHRONICITY: Primary | ICD-10-CM

## 2024-08-08 PROCEDURE — 97140 MANUAL THERAPY 1/> REGIONS: CPT | Performed by: PHYSICAL THERAPIST

## 2024-08-08 PROCEDURE — 97110 THERAPEUTIC EXERCISES: CPT | Performed by: PHYSICAL THERAPIST

## 2024-08-08 PROCEDURE — 97112 NEUROMUSCULAR REEDUCATION: CPT | Performed by: PHYSICAL THERAPIST

## 2024-08-08 NOTE — PROGRESS NOTES
"Daily Note     Today's date: 2024  Patient name: Randy Salas  : 1955  MRN: [id number]  Referring provider: Felipe Arana MD  Dx:   Encounter Diagnosis     ICD-10-CM    1. Left knee pain, unspecified chronicity  M25.562                      Subjective: Pt reports that his knee has felt a little tweaky since Monday.        Objective: See treatment diary below      Assessment: Tolerated treatment well. Patient demonstrated fatigue post treatment, exhibited good technique with therapeutic exercises, and would benefit from continued PT      Plan: Continue per plan of care.      Precautions: None       Manuals         Long axis traction L LE HK KO MM KO HK                                               Ther Ex         Bike NV 10 min 10 min 10 min 10'        Bridging  x30 x30 x30 x30 x30        T-band abd Blk  x30 Blk  x30 Blk x30 Blk  x30 Blk  x30        Add sq 5\"  x30 5\"  x30 5\"x30 5\"  x30 5\"  x30        Heel pushes 5\"  x30 5\"  x30 5\" x30 5\"  x30 5\"  x30        Hip IR  Blk  x30 Blk x30 Blk  x30 Blk  x30        Standing hip abd  3x10 3x10 3x10 3x10        Ab bracing    5\"  3x10 5\"  3x10                                                                                                                                                                                                                                              "

## 2024-08-12 ENCOUNTER — OFFICE VISIT (OUTPATIENT)
Dept: PHYSICAL THERAPY | Facility: MEDICAL CENTER | Age: 69
End: 2024-08-12
Payer: MEDICARE

## 2024-08-12 DIAGNOSIS — M25.562 LEFT KNEE PAIN, UNSPECIFIED CHRONICITY: Primary | ICD-10-CM

## 2024-08-12 PROCEDURE — 97140 MANUAL THERAPY 1/> REGIONS: CPT

## 2024-08-12 PROCEDURE — 97112 NEUROMUSCULAR REEDUCATION: CPT

## 2024-08-12 PROCEDURE — 97110 THERAPEUTIC EXERCISES: CPT

## 2024-08-12 NOTE — PROGRESS NOTES
"Daily Note     Today's date: 2024  Patient name: Randy Salas  : 1955  MRN: [id number]  Referring provider: Felipe Arana MD  Dx:   Encounter Diagnosis     ICD-10-CM    1. Left knee pain, unspecified chronicity  M25.562                      Subjective: Pt reports that he felt better after LAD performed at .       Objective: See treatment diary below      Assessment: Tolerated treatment well. Patient demonstrated fatigue post treatment, exhibited good technique with therapeutic exercises, and would benefit from continued PT Pt is responding well to LAD.      Plan: Continue per plan of care.      Precautions: None       Manuals        Long axis traction L LE HK KO MM KO HK KO                                              Ther Ex        Bike NV 10 min 10 min 10 min 10' 10'       Bridging  x30 x30 x30 x30 x30 x30       T-band abd Blk  x30 Blk  x30 Blk x30 Blk  x30 Blk  x30 Blk  x30       Add sq 5\"  x30 5\"  x30 5\"x30 5\"  x30 5\"  x30 5\"  x30       Heel pushes 5\"  x30 5\"  x30 5\" x30 5\"  x30 5\"  x30 5\"  x30       Hip IR  Blk  x30 Blk x30 Blk  x30 Blk  x30 Blk  x30       Standing hip abd  3x10 3x10 3x10 3x10 3x10       Ab bracing    5\"  3x10 5\"  3x10 5\"  3x10P                                                                                                                                                                                                                                               "

## 2024-08-15 ENCOUNTER — OFFICE VISIT (OUTPATIENT)
Dept: PHYSICAL THERAPY | Facility: MEDICAL CENTER | Age: 69
End: 2024-08-15
Payer: MEDICARE

## 2024-08-15 DIAGNOSIS — M25.562 LEFT KNEE PAIN, UNSPECIFIED CHRONICITY: Primary | ICD-10-CM

## 2024-08-15 PROCEDURE — 97112 NEUROMUSCULAR REEDUCATION: CPT

## 2024-08-15 PROCEDURE — 97140 MANUAL THERAPY 1/> REGIONS: CPT

## 2024-08-15 PROCEDURE — 97110 THERAPEUTIC EXERCISES: CPT

## 2024-08-15 NOTE — PROGRESS NOTES
"Daily Note     Today's date: 8/15/2024  Patient name: Randy Salas  : 1955  MRN: [id number]  Referring provider: Felipe Arana MD  Dx:   Encounter Diagnosis     ICD-10-CM    1. Left knee pain, unspecified chronicity  M25.562                      Subjective: Pt reports that his knee was feeling good and was able to golf, cut the grass and walk on the TM without any increased pain, but his knee feels a little twitchy at present.       Objective: See treatment diary below      Assessment: Tolerated treatment well. Patient demonstrated fatigue post treatment, exhibited good technique with therapeutic exercises, and would benefit from continued PT  Pt is responding well to current tx plan.       Plan: Continue per plan of care.      Precautions: None       Manuals 7/18 7/29 8/1 8/5 8/8 8/12 8/15      Long axis traction L LE HK KO MM KO HK KO KO                                             Ther Ex 7/18 7/29 8/1 8/5 8/8 8/12 8/15      Bike NV 10 min 10 min 10 min 10' 10' 10'      Bridging  x30 x30 x30 x30 x30 x30 x30      T-band abd Blk  x30 Blk  x30 Blk x30 Blk  x30 Blk  x30 Blk  x30 Blk  x30      Add sq 5\"  x30 5\"  x30 5\"x30 5\"  x30 5\"  x30 5\"  x30 5\"  x30      Heel pushes 5\"  x30 5\"  x30 5\" x30 5\"  x30 5\"  x30 5\"  x30 5\"  x30      Hip IR  Blk  x30 Blk x30 Blk  x30 Blk  x30 Blk  x30 Blk  x30      Standing hip abd  3x10 3x10 3x10 3x10 3x10 3x10      Ab bracing    5\"  3x10 5\"  3x10 5\"  3x10P 5\"  3x10      Seated hip IR       Blk  3x10      Clamshells       3x10      Reverse CS       3x10                                                                                                                                                                                                         "

## 2024-08-19 ENCOUNTER — OFFICE VISIT (OUTPATIENT)
Dept: PHYSICAL THERAPY | Facility: MEDICAL CENTER | Age: 69
End: 2024-08-19
Payer: MEDICARE

## 2024-08-19 DIAGNOSIS — M25.562 LEFT KNEE PAIN, UNSPECIFIED CHRONICITY: Primary | ICD-10-CM

## 2024-08-19 PROCEDURE — 97110 THERAPEUTIC EXERCISES: CPT

## 2024-08-19 PROCEDURE — 97112 NEUROMUSCULAR REEDUCATION: CPT

## 2024-08-19 NOTE — PROGRESS NOTES
"Daily Note     Today's date: 2024  Patient name: Randy Salas  : 1955  MRN: [id number]  Referring provider: Felipe Arana MD  Dx:   Encounter Diagnosis     ICD-10-CM    1. Left knee pain, unspecified chronicity  M25.562                      Subjective: Pt reports that his knee has been feeling good with golfing, but is experiencing some discomfort in his knee at present.  Pt is unsure as to what continues to cause the malalignment.       Objective: See treatment diary below      Assessment: Tolerated treatment well. Patient demonstrated fatigue post treatment, exhibited good technique with therapeutic exercises, and would benefit from continued PT + LLD R>L which was corrected w/LAD.        Plan: Continue per plan of care.      Precautions: None       Manuals 7/18 7/29 8/1 8/5 8/8 8/12 8/15 8/19     Long axis traction L LE HK KO MM KO HK KO KO KO                                            Ther Ex 7/18 7/29 8/1 8/5 8/8 8/12 8/15 8/19     Bike NV 10 min 10 min 10 min 10' 10' 10' 10'     Bridging  x30 x30 x30 x30 x30 x30 x30 x30     T-band abd Blk  x30 Blk  x30 Blk x30 Blk  x30 Blk  x30 Blk  x30 Blk  x30 Blk  x30     Add sq 5\"  x30 5\"  x30 5\"x30 5\"  x30 5\"  x30 5\"  x30 5\"  x30 5\"  x30     Heel pushes 5\"  x30 5\"  x30 5\" x30 5\"  x30 5\"  x30 5\"  x30 5\"  x30 5\"  x30     Hip IR  Blk  x30 Blk x30 Blk  x30 Blk  x30 Blk  x30 Blk  x30 Blk  x30     Standing hip abd  3x10 3x10 3x10 3x10 3x10 3x10 3x10     Ab bracing    5\"  3x10 5\"  3x10 5\"  3x10P 5\"  3x10 5\"  3x10     Seated hip IR       Blk  3x10 Blk  3x10     Clamshells       3x10 x30     Reverse CS       3x10 x30                                                                                                                                                                                                          "

## 2024-08-21 ENCOUNTER — APPOINTMENT (OUTPATIENT)
Dept: PHYSICAL THERAPY | Facility: MEDICAL CENTER | Age: 69
End: 2024-08-21
Payer: MEDICARE

## 2024-08-27 ENCOUNTER — OFFICE VISIT (OUTPATIENT)
Dept: PHYSICAL THERAPY | Facility: MEDICAL CENTER | Age: 69
End: 2024-08-27
Payer: MEDICARE

## 2024-08-27 ENCOUNTER — OFFICE VISIT (OUTPATIENT)
Dept: OBGYN CLINIC | Facility: MEDICAL CENTER | Age: 69
End: 2024-08-27
Payer: MEDICARE

## 2024-08-27 VITALS
SYSTOLIC BLOOD PRESSURE: 134 MMHG | BODY MASS INDEX: 27.2 KG/M2 | DIASTOLIC BLOOD PRESSURE: 78 MMHG | HEIGHT: 70 IN | WEIGHT: 190 LBS | HEART RATE: 80 BPM

## 2024-08-27 DIAGNOSIS — M18.11 ARTHRITIS OF CARPOMETACARPAL (CMC) JOINT OF RIGHT THUMB: Primary | ICD-10-CM

## 2024-08-27 DIAGNOSIS — M25.562 LEFT KNEE PAIN, UNSPECIFIED CHRONICITY: Primary | ICD-10-CM

## 2024-08-27 DIAGNOSIS — M18.12 ARTHRITIS OF CARPOMETACARPAL (CMC) JOINT OF LEFT THUMB: ICD-10-CM

## 2024-08-27 PROCEDURE — 99213 OFFICE O/P EST LOW 20 MIN: CPT | Performed by: ORTHOPAEDIC SURGERY

## 2024-08-27 PROCEDURE — 97110 THERAPEUTIC EXERCISES: CPT

## 2024-08-27 PROCEDURE — 97112 NEUROMUSCULAR REEDUCATION: CPT

## 2024-08-27 RX ORDER — ACETAMINOPHEN 325 MG/1
325 TABLET ORAL EVERY 6 HOURS PRN
COMMUNITY

## 2024-08-27 RX ORDER — CICLOPIROX 80 MG/ML
SOLUTION TOPICAL
COMMUNITY
Start: 2024-08-26

## 2024-08-27 NOTE — PROGRESS NOTES
HAND & UPPER EXTREMITY OFFICE VISIT   Referred By:  No referring provider defined for this encounter.      Chief Complaint:     Bilateral thumb pain    Previous History:   Previously seen on 2/22 and elected to proceed with conservative management including bracing and anti-inflammatories. He was also referred to hand therapy in March.     Interval History:  Since the last visit he reports his symptoms have been manageable. His left thumb is doing better since attending PT. His right thumb does still bother him after activities, but is usually well-controlled with anti-inflammatories. He presents today to review his treatment options     Past Medical History:  Past Medical History:   Diagnosis Date    DVT (deep vein thrombosis) in pregnancy     Enlarged prostate     Gout      Past Surgical History:   Procedure Laterality Date    KNEE ARTHROSCOPY W/ MENISCAL REPAIR Left     SHOULDER SURGERY       History reviewed. No pertinent family history.  Social History     Socioeconomic History    Marital status: /Civil Union     Spouse name: Not on file    Number of children: Not on file    Years of education: Not on file    Highest education level: Not on file   Occupational History    Not on file   Tobacco Use    Smoking status: Never    Smokeless tobacco: Never   Substance and Sexual Activity    Alcohol use: No    Drug use: No    Sexual activity: Not on file   Other Topics Concern    Not on file   Social History Narrative    Not on file     Social Determinants of Health     Financial Resource Strain: Not on file   Food Insecurity: Not on file   Transportation Needs: Not on file   Physical Activity: Not on file   Stress: Not on file   Social Connections: Unknown (6/18/2024)    Received from [facility]     How often do you feel lonely or isolated from those around you? (Adult - for ages 18 years and over): Not on file   Intimate Partner Violence: Not on file   Housing Stability: Not on file  "    Scheduled Meds:  Continuous Infusions:No current facility-administered medications for this visit.    PRN Meds:.  No Known Allergies    Physical Examination:    /78   Pulse 80   Ht 5' 10\" (1.778 m)   Wt 86.2 kg (190 lb)   BMI 27.26 kg/m²     Gen: A&Ox3, NAD  Cardiac: regular rate  Chest: non labored breathing  Abdomen: Non-distended    Right Upper Extremity:  Skin CDI  Heberden Nodules of right index and middle DIP with coronal and rotational deformity of distal phalanges  Z deformity with decreased CMC extension, 30 deg MP hyperextension  TTP over thumb CMC  Positive pressure-shear test  Non-tender 1st dorsal compartment  Negative Finkelstein's  5/5 thumb abduction  Sensation intact to light touch in the axillary median, ulnar, and radial nerve distributions  2+RP        Left Upper Extremity:  Skin CDI  Heberden's nodules over ring and small DIP with coronal and rotational deformity of distal phalanges  + shoulder sign  TTP over thumb CMC  Positive pressure-shear test  Non-tender 1st dorsal compartment  Negative Finkelstein's  5/5 thumb abduction  Sensation intact to light touch in the axillary median, ulnar, and radial nerve distributions  2+RP      Studies:  No new imaging to review      Assessment and Plan:  1. Arthritis of carpometacarpal (CMC) joint of right thumb        2. Arthritis of carpometacarpal (CMC) joint of left thumb            69 y.o. male presents in follow up for the above diagnosis. He reports the left thumb has been doing well since PT, and the right thumb has been well-controlled with anti-inflammatories. We reviewed additional treatment options in detail including CSI and CMC arthroplasty. We discussed that if his symptoms are well-controlled with oral/topical anti-inflammatories, I recommend holding off on any further interventions at this point as they carry additional risks/side effects. If his symptoms are no longer well-controlled, then we may consider additional treatment " options. All questions answered to patient's satisfaction. It is recommended he return to the office as needed if the problem fails to improve, worsens, or recurs.    he expressed understanding of the plan and agreed. We encouraged them to contact our office with any questions or concerns.       Tim Iraheta MD  Hand and Upper Extremity Surgery      *This note was dictated using Dragon voice recognition software. Please excuse any word substitutions or errors.*

## 2024-08-27 NOTE — PROGRESS NOTES
"Daily Note     Today's date: 2024  Patient name: Randy Salas  : 1955  MRN: [id number]  Referring provider: Felipe Arana MD  Dx:   Encounter Diagnosis     ICD-10-CM    1. Left knee pain, unspecified chronicity  M25.562                      Subjective: Pt reports that he's been quite active for the past week with golfing, walking and cutting the grass.  Pt notes L knee soreness at present.        Objective: See treatment diary below      Assessment: Tolerated treatment well. Patient demonstrated fatigue post treatment, exhibited good technique with therapeutic exercises, and would benefit from continued PT  + LLD R>L which was corrected w/LAD.        Plan: Continue per plan of care.      Precautions: None       Manuals 7/18 7/29 8/1 8/5 8/8 8/12 8/15 8/19 8/27    Long axis traction L LE HK KO MM KO HK KO KO KO KO                                           Ther Ex 7/18 7/29 8/1 8/5 8/8 8/12 8/15 8/19 8/27    Bike NV 10 min 10 min 10 min 10' 10' 10' 10' 10'    Bridging  x30 x30 x30 x30 x30 x30 x30 x30 x30    T-band abd Blk  x30 Blk  x30 Blk x30 Blk  x30 Blk  x30 Blk  x30 Blk  x30 Blk  x30 Blk  x30    Add sq 5\"  x30 5\"  x30 5\"x30 5\"  x30 5\"  x30 5\"  x30 5\"  x30 5\"  x30 5\"  x30    Heel pushes 5\"  x30 5\"  x30 5\" x30 5\"  x30 5\"  x30 5\"  x30 5\"  x30 5\"  x30 5\"  x30    Hip IR  Blk  x30 Blk x30 Blk  x30 Blk  x30 Blk  x30 Blk  x30 Blk  x30 Blk  x30    Standing hip abd  3x10 3x10 3x10 3x10 3x10 3x10 3x10 3x10    Ab bracing    5\"  3x10 5\"  3x10 5\"  3x10P 5\"  3x10 5\"  3x10 5\"  3x10    Seated hip IR       Blk  3x10 Blk  3x10 Blk  3x10    Clamshells       3x10 x30 x30    Reverse CS       3x10 x30 x30                                                                                                                                                                                                           "

## 2024-09-03 ENCOUNTER — APPOINTMENT (OUTPATIENT)
Dept: PHYSICAL THERAPY | Facility: MEDICAL CENTER | Age: 69
End: 2024-09-03
Payer: MEDICARE

## 2024-09-03 ENCOUNTER — OFFICE VISIT (OUTPATIENT)
Dept: PHYSICAL THERAPY | Facility: MEDICAL CENTER | Age: 69
End: 2024-09-03
Payer: MEDICARE

## 2024-09-03 DIAGNOSIS — M25.562 LEFT KNEE PAIN, UNSPECIFIED CHRONICITY: Primary | ICD-10-CM

## 2024-09-03 PROCEDURE — 97112 NEUROMUSCULAR REEDUCATION: CPT

## 2024-09-03 PROCEDURE — 97110 THERAPEUTIC EXERCISES: CPT

## 2024-09-03 NOTE — PROGRESS NOTES
"Daily Note     Today's date: 9/3/2024  Patient name: Randy Salas  : 1955  MRN: [id number]  Referring provider: Felipe Arana MD  Dx:   Encounter Diagnosis     ICD-10-CM    1. Left knee pain, unspecified chronicity  M25.562                      Subjective: Pt reports that his knee felt great after LV, but then experienced some discomfort after cutting the grass.  Pt states that he performed his PT ex's and then his knee felt better.       Objective: See treatment diary below      Assessment: Tolerated treatment well. Patient demonstrated fatigue post treatment, exhibited good technique with therapeutic exercises, and would benefit from continued PT + LLD which was corrected with LAD to L LE.       Plan: Continue per plan of care.      Precautions: None       Manuals 7/18 7/29 8/1 8/5 8/8 8/12 8/15 8/19 8/27 9/3   Long axis traction L LE HK KO MM KO HK KO KO KO KO KO                                          Ther Ex 7/18 7/29 8/1 8/5 8/8 8/12 8/15 8/19 8/27 9/3   Bike NV 10 min 10 min 10 min 10' 10' 10' 10' 10' 10'   Bridging  x30 x30 x30 x30 x30 x30 x30 x30 x30 x30   T-band abd Blk  x30 Blk  x30 Blk x30 Blk  x30 Blk  x30 Blk  x30 Blk  x30 Blk  x30 Blk  x30 Blk  x30   Add sq 5\"  x30 5\"  x30 5\"x30 5\"  x30 5\"  x30 5\"  x30 5\"  x30 5\"  x30 5\"  x30 5\"  x30   Heel pushes 5\"  x30 5\"  x30 5\" x30 5\"  x30 5\"  x30 5\"  x30 5\"  x30 5\"  x30 5\"  x30 5\"  x30   Hip IR  Blk  x30 Blk x30 Blk  x30 Blk  x30 Blk  x30 Blk  x30 Blk  x30 Blk  x30 Blk  x30   Standing hip abd  3x10 3x10 3x10 3x10 3x10 3x10 3x10 3x10 3x10   Ab bracing    5\"  3x10 5\"  3x10 5\"  3x10P 5\"  3x10 5\"  3x10 5\"  3x10 5\"  3x10   Seated hip IR       Blk  3x10 Blk  3x10 Blk  3x10 Blk  3x10   Clamshells       3x10 x30 x30 x30   Reverse CS       3x10 x30 x30 x30                                                                                                                                                                                                            "

## 2024-09-10 ENCOUNTER — OFFICE VISIT (OUTPATIENT)
Dept: PHYSICAL THERAPY | Facility: MEDICAL CENTER | Age: 69
End: 2024-09-10
Payer: MEDICARE

## 2024-09-10 DIAGNOSIS — M25.562 LEFT KNEE PAIN, UNSPECIFIED CHRONICITY: Primary | ICD-10-CM

## 2024-09-10 PROCEDURE — 97140 MANUAL THERAPY 1/> REGIONS: CPT

## 2024-09-10 PROCEDURE — 97110 THERAPEUTIC EXERCISES: CPT

## 2024-09-10 NOTE — PROGRESS NOTES
"Daily Note     Today's date: 9/10/2024  Patient name: Randy Salas  : 1955  MRN: [id number]  Referring provider: Felipe Arana MD  Dx:   Encounter Diagnosis     ICD-10-CM    1. Left knee pain, unspecified chronicity  M25.562                      Subjective: Pt reports that his knee is feeling pretty good and feels better since he changed his shoes to cut the grass.  Pt reports that his knee gets a little sore and stiff with prolonged sitting, but feels better once he moves around.       Objective: See treatment diary below      Assessment: Tolerated treatment well. Patient demonstrated fatigue post treatment, exhibited good technique with therapeutic exercises, and would benefit from continued PT  + LLD R>L which was corrected w/LAD.        Plan: Continue per plan of care.      Precautions: None       Manuals 9/10            Long axis traction L LE KO                                                   Ther Ex 9/10            Bike 10 min            Bridging  x30            T-band abd Blk  x30            Add sq 5\"  x30            Heel pushes 5\"  x30            Hip IR Blk  x30            Standing hip abd 3x10            Ab bracing 5\"  3x10            Seated hip IR Blk  3x10            Clamshells x30            Reverse CS x30            Quad stretch 3x30\"                                                                                                                                                                                                          "

## 2024-09-17 ENCOUNTER — OFFICE VISIT (OUTPATIENT)
Dept: PHYSICAL THERAPY | Facility: MEDICAL CENTER | Age: 69
End: 2024-09-17
Payer: MEDICARE

## 2024-09-17 DIAGNOSIS — M25.562 LEFT KNEE PAIN, UNSPECIFIED CHRONICITY: Primary | ICD-10-CM

## 2024-09-17 PROCEDURE — 97140 MANUAL THERAPY 1/> REGIONS: CPT

## 2024-09-17 PROCEDURE — 97110 THERAPEUTIC EXERCISES: CPT

## 2024-09-17 NOTE — PROGRESS NOTES
"Daily Note     Today's date: 2024  Patient name: Randy Salas  : 1955  MRN: [id number]  Referring provider: Felipe Arana MD  Dx:   Encounter Diagnosis     ICD-10-CM    1. Left knee pain, unspecified chronicity  M25.562                      Subjective: Pt reports that he feels good about an hour after PT treatments and feels as though his sx's are taking longer to return.        Objective: See treatment diary below      Assessment: Tolerated treatment well. Patient demonstrated fatigue post treatment, exhibited good technique with therapeutic exercises, and would benefit from continued PT + LLD which was corrected w/LAD.        Plan: Continue per plan of care.      Precautions: None       Manuals 9/10 9/17           Long axis traction L LE KO KO                                                  Ther Ex 9/10 9/17           Bike 10 min 10 min             Bridging  x30 x30           T-band abd Blk  x30 Blk  x30           Add sq 5\"  x30 5\"  x30           Heel pushes 5\"  x30 5\"  x30           Hip IR Blk  x30 Blk  x30           Standing hip abd 3x10 3x10           Ab bracing 5\"  3x10 5\"  x30           Seated hip IR Blk  3x10 Blk  3x10           Clamshells x30 x30           Reverse CS x30 x30           Quad stretch 3x30\" 3x30\"                                                                                                                                                                                                           "

## 2024-09-24 ENCOUNTER — OFFICE VISIT (OUTPATIENT)
Dept: PHYSICAL THERAPY | Facility: MEDICAL CENTER | Age: 69
End: 2024-09-24
Payer: MEDICARE

## 2024-09-24 DIAGNOSIS — M25.562 LEFT KNEE PAIN, UNSPECIFIED CHRONICITY: Primary | ICD-10-CM

## 2024-09-24 PROCEDURE — 97140 MANUAL THERAPY 1/> REGIONS: CPT

## 2024-09-24 PROCEDURE — 97110 THERAPEUTIC EXERCISES: CPT

## 2024-09-24 NOTE — PROGRESS NOTES
"Daily Note     Today's date: 2024  Patient name: Randy Salas  : 1955  MRN: [id number]  Referring provider: Felipe Arana MD  Dx:   Encounter Diagnosis     ICD-10-CM    1. Left knee pain, unspecified chronicity  M25.562                      Subjective: Pt reports that this past week he's felt the best he's felt in a long time.       Objective: See treatment diary below      Assessment: Tolerated treatment well. Patient demonstrated fatigue post treatment, exhibited good technique with therapeutic exercises, and would benefit from continued PT + LLD which was corrected w/LAD to L LE.       Plan: Continue per plan of care.      Precautions: None       Manuals 9/10 9/17 9/24          Long axis traction L LAWANDA KO KO KO                                                 Ther Ex 9/10 9/17 9/24          Bike 10 min 10 min   10 min          Bridging  x30 x30 x30          T-band abd Blk  x30 Blk  x30 Blk  x30          Add sq 5\"  x30 5\"  x30 5\"  x30          Heel pushes 5\"  x30 5\"  x30 5\"  x30          Hip IR Blk  x30 Blk  x30 Blk  x30          Standing hip abd 3x10 3x10 3x10          Ab bracing 5\"  3x10 5\"  x30 5\"  x30          Seated hip IR Blk  3x10 Blk  3x10 Blk  3x10          Clamshells x30 x30 x30          Reverse CS x30 x30 x30          Quad stretch 3x30\" 3x30\" 3x30\"                                                                                                                                                                                                            "

## 2024-10-01 ENCOUNTER — OFFICE VISIT (OUTPATIENT)
Dept: PHYSICAL THERAPY | Facility: MEDICAL CENTER | Age: 69
End: 2024-10-01
Payer: MEDICARE

## 2024-10-01 DIAGNOSIS — M25.562 LEFT KNEE PAIN, UNSPECIFIED CHRONICITY: Primary | ICD-10-CM

## 2024-10-01 PROCEDURE — 97110 THERAPEUTIC EXERCISES: CPT

## 2024-10-01 PROCEDURE — 97140 MANUAL THERAPY 1/> REGIONS: CPT

## 2024-10-01 NOTE — PROGRESS NOTES
"Daily Note     Today's date: 10/1/2024  Patient name: Randy Salas  : 1955  MRN: [id number]  Referring provider: Felipe Arana MD  Dx:   Encounter Diagnosis     ICD-10-CM    1. Left knee pain, unspecified chronicity  M25.562                      Subjective: Pt reports that his knee feels ok, but he is having quite a bit of pain on the R side of his LB.       Objective: See treatment diary below      Assessment: Tolerated treatment well. Patient demonstrated fatigue post treatment, exhibited good technique with therapeutic exercises, and would benefit from continued PT  + LLD which was corrected w/LAD to L LE.        Plan: Continue per plan of care.      Precautions: None       Manuals 9/10 9/17 9/24 10/1         Long axis traction L LE KO KO KO KO                                                Ther Ex 9/10 9/17 9/24 10/1         Bike 10 min 10 min   10 min 10 min         Bridging  x30 x30 x30 x30         T-band abd Blk  x30 Blk  x30 Blk  x30 Blk  x30         Add sq 5\"  x30 5\"  x30 5\"  x30 5\"  x30         Heel pushes 5\"  x30 5\"  x30 5\"  x30 5\"  x30         Hip IR Blk  x30 Blk  x30 Blk  x30 Blk  x30         Standing hip abd 3x10 3x10 3x10 3x10         Ab bracing 5\"  3x10 5\"  x30 5\"  x30 5\"  x30         Seated hip IR Blk  3x10 Blk  3x10 Blk  3x10 Blk  3x10         Clamshells x30 x30 x30 x30         Reverse CS x30 x30 x30 x30         Quad stretch 3x30\" 3x30\" 3x30\" NP                                                                                                                 Modalities    10/1         MHP    15'  Pre  And  Post  To LB                                                                           "

## 2024-10-03 ENCOUNTER — OFFICE VISIT (OUTPATIENT)
Dept: PHYSICAL THERAPY | Facility: MEDICAL CENTER | Age: 69
End: 2024-10-03
Payer: MEDICARE

## 2024-10-03 DIAGNOSIS — M25.562 LEFT KNEE PAIN, UNSPECIFIED CHRONICITY: Primary | ICD-10-CM

## 2024-10-03 PROCEDURE — 97110 THERAPEUTIC EXERCISES: CPT

## 2024-10-03 PROCEDURE — 97140 MANUAL THERAPY 1/> REGIONS: CPT

## 2024-10-03 NOTE — PROGRESS NOTES
"Daily Note     Today's date: 10/3/2024  Patient name: Randy Salas  : 1955  MRN: [id number]  Referring provider: Felipe Arana MD  Dx:   Encounter Diagnosis     ICD-10-CM    1. Left knee pain, unspecified chronicity  M25.562                      Subjective: Pt reports that his knee feels ok, but his R LB continues to be painful.        Objective: See treatment diary below      Assessment: Tolerated treatment well. Patient demonstrated fatigue post treatment, exhibited good technique with therapeutic exercises, and would benefit from continued PT  Mobs performed by PT, HK - assess at NV.     Plan: Continue per plan of care.      Precautions: None       Manuals 9/10 9/17 9/24 10/1 10/3        Long axis traction L LE KO KO KO KO KO        SL mobs     HK                                  Ther Ex 9/10 9/17 9/24 10/1 10/3        Bike 10 min 10 min   10 min 10 min 10 min        Bridging  x30 x30 x30 x30 x30        T-band abd Blk  x30 Blk  x30 Blk  x30 Blk  x30 Blk  x30        Add sq 5\"  x30 5\"  x30 5\"  x30 5\"  x30 5\"  x30        Heel pushes 5\"  x30 5\"  x30 5\"  x30 5\"  x30 5\"  x30        Hip IR Blk  x30 Blk  x30 Blk  x30 Blk  x30 Blk  x30        Standing hip abd 3x10 3x10 3x10 3x10 NP        Ab bracing 5\"  3x10 5\"  x30 5\"  x30 5\"  x30 5\"  x30        Seated hip IR Blk  3x10 Blk  3x10 Blk  3x10 Blk  3x10 Blk  x30        Clamshells x30 x30 x30 x30 x30        Reverse CS x30 x30 x30 x30 x30        Quad stretch 3x30\" 3x30\" 3x30\" NP NP                     LTR w/towel roll     3x10                                                                                      Modalities    10/1 10/3        MHP    15'  Pre  And  Post  To LB 15'  post                                                                            "

## 2024-10-07 ENCOUNTER — OFFICE VISIT (OUTPATIENT)
Dept: PHYSICAL THERAPY | Facility: MEDICAL CENTER | Age: 69
End: 2024-10-07
Payer: MEDICARE

## 2024-10-07 DIAGNOSIS — M25.562 LEFT KNEE PAIN, UNSPECIFIED CHRONICITY: Primary | ICD-10-CM

## 2024-10-07 PROCEDURE — 97112 NEUROMUSCULAR REEDUCATION: CPT

## 2024-10-07 PROCEDURE — 97110 THERAPEUTIC EXERCISES: CPT

## 2024-10-07 PROCEDURE — 97140 MANUAL THERAPY 1/> REGIONS: CPT

## 2024-10-07 NOTE — PROGRESS NOTES
"Daily Note     Today's date: 10/7/2024  Patient name: Randy Salas  : 1955  MRN: [id number]  Referring provider: Felipe Arana MD  Dx:   Encounter Diagnosis     ICD-10-CM    1. Left knee pain, unspecified chronicity  M25.562                      Subjective: Pt reports that his back continues to be painful and almost feels worse today.        Objective: See treatment diary below      Assessment: Tolerated treatment well. Patient demonstrated fatigue post treatment, exhibited good technique with therapeutic exercises, and would benefit from continued PT  Decreased pain in LB post manual therapy.        Plan: Continue per plan of care.      Precautions: None       Manuals 9/10 9/17 9/24 10/1 10/3 10/7       Long axis traction L LE KO KO KO KO KO HK       SL mobs     HK HK       Lumbar mobs      HK                    Ther Ex 9/10 9/17 9/24 10/1 10/3 10/7       Bike 10 min 10 min   10 min 10 min 10 min 10 min       Bridging  x30 x30 x30 x30 x30 x30       T-band abd Blk  x30 Blk  x30 Blk  x30 Blk  x30 Blk  x30 Blk  x30       Add sq 5\"  x30 5\"  x30 5\"  x30 5\"  x30 5\"  x30 5\"  x30       Heel pushes 5\"  x30 5\"  x30 5\"  x30 5\"  x30 5\"  x30 5\"  x30       Hip IR Blk  x30 Blk  x30 Blk  x30 Blk  x30 Blk  x30 Blk  x30       Standing hip abd 3x10 3x10 3x10 3x10 NP NP       Ab bracing 5\"  3x10 5\"  x30 5\"  x30 5\"  x30 5\"  x30 NP       Seated hip IR Blk  3x10 Blk  3x10 Blk  3x10 Blk  3x10 Blk  x30 NP       Clamshells x30 x30 x30 x30 x30 NP       Reverse CS x30 x30 x30 x30 x30 NP       Quad stretch 3x30\" 3x30\" 3x30\" NP NP NP                    LTR w/towel roll     3x10 NP                                                                                     Modalities    10/1 10/3 10/7       MHP    15'  Pre  And  Post  To LB 15'  post 15'  Post                                                                              "

## 2024-10-10 ENCOUNTER — OFFICE VISIT (OUTPATIENT)
Dept: PHYSICAL THERAPY | Facility: MEDICAL CENTER | Age: 69
End: 2024-10-10
Payer: MEDICARE

## 2024-10-10 DIAGNOSIS — M25.562 LEFT KNEE PAIN, UNSPECIFIED CHRONICITY: Primary | ICD-10-CM

## 2024-10-10 PROCEDURE — 97112 NEUROMUSCULAR REEDUCATION: CPT | Performed by: PHYSICAL THERAPIST

## 2024-10-10 PROCEDURE — 97110 THERAPEUTIC EXERCISES: CPT | Performed by: PHYSICAL THERAPIST

## 2024-10-10 PROCEDURE — 97140 MANUAL THERAPY 1/> REGIONS: CPT | Performed by: PHYSICAL THERAPIST

## 2024-10-10 NOTE — PROGRESS NOTES
"Daily Note     Today's date: 10/10/2024  Patient name: Randy Salas  : 1955  MRN: [id number]  Referring provider: Felipe Arana MD  Dx:   Encounter Diagnosis     ICD-10-CM    1. Left knee pain, unspecified chronicity  M25.562                      Subjective: Pt reports that his back is more than 50% better since Monday.        Objective: See treatment diary below    - LLD    Assessment: Tolerated treatment well. Patient demonstrated fatigue post treatment, exhibited good technique with therapeutic exercises, and would benefit from continued PT      Plan: Continue per plan of care.      Precautions: None       Manuals 9/10 9/17 9/24 10/1 10/3 10/7 10/10      Long axis traction L LE KO KO KO KO KO HK NR      SL mobs     HK HK       Lumbar mobs      HK                    Ther Ex 9/10 9/17 9/24 10/1 10/3 10/7 10/10      Bike 10 min 10 min   10 min 10 min 10 min 10 min 10'      Bridging  x30 x30 x30 x30 x30 x30 x30      T-band abd Blk  x30 Blk  x30 Blk  x30 Blk  x30 Blk  x30 Blk  x30 Blk  x30      Add sq 5\"  x30 5\"  x30 5\"  x30 5\"  x30 5\"  x30 5\"  x30 5\"  x30      Heel pushes 5\"  x30 5\"  x30 5\"  x30 5\"  x30 5\"  x30 5\"  x30 5\"  x30      Hip IR Blk  x30 Blk  x30 Blk  x30 Blk  x30 Blk  x30 Blk  x30 Blk  x30      Standing hip abd 3x10 3x10 3x10 3x10 NP NP NP      Ab bracing 5\"  3x10 5\"  x30 5\"  x30 5\"  x30 5\"  x30 NP 5\"  x30      Seated hip IR Blk  3x10 Blk  3x10 Blk  3x10 Blk  3x10 Blk  x30 NP Blk  x30      Clamshells x30 x30 x30 x30 x30 NP x30      Reverse CS x30 x30 x30 x30 x30 NP x30      Quad stretch 3x30\" 3x30\" 3x30\" NP NP NP NP                   LTR w/towel roll     3x10 NP NP                                                                                    Modalities    10/1 10/3 10/7 10/10      MHP    15'  Pre  And  Post  To LB 15'  post 15'  Post  15'                                                                              "

## 2024-10-15 ENCOUNTER — OFFICE VISIT (OUTPATIENT)
Dept: PHYSICAL THERAPY | Facility: MEDICAL CENTER | Age: 69
End: 2024-10-15
Payer: MEDICARE

## 2024-10-15 DIAGNOSIS — M25.562 LEFT KNEE PAIN, UNSPECIFIED CHRONICITY: Primary | ICD-10-CM

## 2024-10-15 PROCEDURE — 97110 THERAPEUTIC EXERCISES: CPT

## 2024-10-15 PROCEDURE — 97112 NEUROMUSCULAR REEDUCATION: CPT

## 2024-10-15 PROCEDURE — 97140 MANUAL THERAPY 1/> REGIONS: CPT

## 2024-10-15 NOTE — PROGRESS NOTES
"Daily Note     Today's date: 10/15/2024  Patient name: Randy Salas  : 1955  MRN: [id number]  Referring provider: Felipe Arana MD  Dx:   Encounter Diagnosis     ICD-10-CM    1. Left knee pain, unspecified chronicity  M25.562                      Subjective: Pt reports that his knee feels great.  Pt states that his LBP is improving and can walk better now, but the pain is \"still there\" which limits him from certain activities.        Objective: See treatment diary below      Assessment: Tolerated treatment well. Patient demonstrated fatigue post treatment, exhibited good technique with therapeutic exercises, and would benefit from continued PT  Pt notes feeling better post manuals.  + LLD discrepancy which was corrected w/LAD.       Plan: Continue per plan of care.      Precautions: None       Manuals 9/10 9/17 9/24 10/1 10/3 10/7 10/10 10/15     Long axis traction L LE KO KO KO KO KO HK NR KO     SL mobs     HK HK       Lumbar mobs      HK  STM  KO                  Ther Ex 9/10 9/17 9/24 10/1 10/3 10/7 10/10 10/15     Bike 10 min 10 min   10 min 10 min 10 min 10 min 10' 10'     Bridging  x30 x30 x30 x30 x30 x30 x30 x30     T-band abd Blk  x30 Blk  x30 Blk  x30 Blk  x30 Blk  x30 Blk  x30 Blk  x30 Blk  x30     Add sq 5\"  x30 5\"  x30 5\"  x30 5\"  x30 5\"  x30 5\"  x30 5\"  x30 5\"  x30     Heel pushes 5\"  x30 5\"  x30 5\"  x30 5\"  x30 5\"  x30 5\"  x30 5\"  x30 5\"  x30     Hip IR Blk  x30 Blk  x30 Blk  x30 Blk  x30 Blk  x30 Blk  x30 Blk  x30 Blk  x30     Standing hip abd 3x10 3x10 3x10 3x10 NP NP NP 3x10     Ab bracing 5\"  3x10 5\"  x30 5\"  x30 5\"  x30 5\"  x30 NP 5\"  x30 5\"  x30     Seated hip IR Blk  3x10 Blk  3x10 Blk  3x10 Blk  3x10 Blk  x30 NP Blk  x30 Blk  x30     Clamshells x30 x30 x30 x30 x30 NP x30 x30     Reverse CS x30 x30 x30 x30 x30 NP x30 x30     Quad stretch 3x30\" 3x30\" 3x30\" NP NP NP NP NP                  LTR w/towel roll     3x10 NP NP 3x10                                                               "                     Modalities    10/1 10/3 10/7 10/10 10/15     MHP    15'  Pre  And  Post  To LB 15'  post 15'  Post  15' Deferred

## 2024-10-17 ENCOUNTER — OFFICE VISIT (OUTPATIENT)
Dept: PHYSICAL THERAPY | Facility: MEDICAL CENTER | Age: 69
End: 2024-10-17
Payer: MEDICARE

## 2024-10-17 DIAGNOSIS — M25.562 LEFT KNEE PAIN, UNSPECIFIED CHRONICITY: Primary | ICD-10-CM

## 2024-10-17 PROCEDURE — 97140 MANUAL THERAPY 1/> REGIONS: CPT

## 2024-10-17 PROCEDURE — 97112 NEUROMUSCULAR REEDUCATION: CPT

## 2024-10-17 PROCEDURE — 97110 THERAPEUTIC EXERCISES: CPT

## 2024-10-17 NOTE — PROGRESS NOTES
"Daily Note     Today's date: 10/17/2024  Patient name: Randy Salas  : 1955  MRN: [id number]  Referring provider: Felipe Arana MD  Dx:   Encounter Diagnosis     ICD-10-CM    1. Left knee pain, unspecified chronicity  M25.562                      Subjective: Pt reports that his knee is feeling good, but he continues to have discomfort in his back.  Pt states he is progressing his activities, but his back gets tight and uncomfortable post.       Objective: See treatment diary below      Assessment: Tolerated treatment well. Patient demonstrated fatigue post treatment, exhibited good technique with therapeutic exercises, and would benefit from continued PT  Improved mobility post manuals.        Plan: Continue per plan of care.      Precautions: None       Manuals 9/10 9/17 9/24 10/1 10/3 10/7 10/10 10/15 10/17    Long axis traction L LE KO KO KO KO KO HK NR KO NP    SL mobs     HK HK   HK    Lumbar mobs      HK  STM  KO STM  KO                 Ther Ex 9/10 9/17 9/24 10/1 10/3 10/7 10/10 10/15 10/17    Bike 10 min 10 min   10 min 10 min 10 min 10 min 10' 10' 10'    Bridging  x30 x30 x30 x30 x30 x30 x30 x30 x30    T-band abd Blk  x30 Blk  x30 Blk  x30 Blk  x30 Blk  x30 Blk  x30 Blk  x30 Blk  x30 Blk  x30    Add sq 5\"  x30 5\"  x30 5\"  x30 5\"  x30 5\"  x30 5\"  x30 5\"  x30 5\"  x30 5\"  x30    Heel pushes 5\"  x30 5\"  x30 5\"  x30 5\"  x30 5\"  x30 5\"  x30 5\"  x30 5\"  x30 5\"  x30    Hip IR Blk  x30 Blk  x30 Blk  x30 Blk  x30 Blk  x30 Blk  x30 Blk  x30 Blk  x30 Blk  x30    Standing hip abd 3x10 3x10 3x10 3x10 NP NP NP 3x10 3x10    Ab bracing 5\"  3x10 5\"  x30 5\"  x30 5\"  x30 5\"  x30 NP 5\"  x30 5\"  x30 5\"  x30    Seated hip IR Blk  3x10 Blk  3x10 Blk  3x10 Blk  3x10 Blk  x30 NP Blk  x30 Blk  x30 Blk  x30    Clamshells x30 x30 x30 x30 x30 NP x30 x30 x30    Reverse CS x30 x30 x30 x30 x30 NP x30 x30 x30    Quad stretch 3x30\" 3x30\" 3x30\" NP NP NP NP NP NP                 LTR w/towel roll     3x10 NP NP 3x10 3x10              "                                                                     Modalities    10/1 10/3 10/7 10/10 10/15 10/17    MHP    15'  Pre  And  Post  To LB 15'  post 15'  Post  15' Deferred NP

## 2024-10-22 ENCOUNTER — OFFICE VISIT (OUTPATIENT)
Dept: PHYSICAL THERAPY | Facility: MEDICAL CENTER | Age: 69
End: 2024-10-22
Payer: MEDICARE

## 2024-10-22 DIAGNOSIS — M25.562 LEFT KNEE PAIN, UNSPECIFIED CHRONICITY: Primary | ICD-10-CM

## 2024-10-22 PROCEDURE — 97140 MANUAL THERAPY 1/> REGIONS: CPT

## 2024-10-22 PROCEDURE — 97112 NEUROMUSCULAR REEDUCATION: CPT

## 2024-10-22 PROCEDURE — 97110 THERAPEUTIC EXERCISES: CPT

## 2024-10-22 NOTE — PROGRESS NOTES
"Daily Note     Today's date: 10/22/2024  Patient name: Randy Salas  : 1955  MRN: [id number]  Referring provider: Felipe Arana MD  Dx:   Encounter Diagnosis     ICD-10-CM    1. Left knee pain, unspecified chronicity  M25.562                      Subjective: Pt reports that he felt better after manuals performed at [facility] but then his back began to stiffen up again a bit later.       Objective: See treatment diary below      Assessment: Tolerated treatment well. Patient demonstrated fatigue post treatment, exhibited good technique with therapeutic exercises, and would benefit from continued PT  Pt is responding well to current tx plan.      Plan: Continue per plan of care.      Precautions: None       Manuals 9/10 9/17 9/24 10/1 10/3 10/7 10/10 10/15 10/17 10/22   Long axis traction L LE KO KO KO KO KO HK NR KO NP KO   SL mobs     HK HK   HK AF   Lumbar mobs      HK  STM  KO STM  KO STM  KO                Ther Ex 9/10 9/17 9/24 10/1 10/3 10/7 10/10 10/15 10/17 10/22   Bike 10 min 10 min   10 min 10 min 10 min 10 min 10' 10' 10' 10'   Bridging  x30 x30 x30 x30 x30 x30 x30 x30 x30 x30   T-band abd Blk  x30 Blk  x30 Blk  x30 Blk  x30 Blk  x30 Blk  x30 Blk  x30 Blk  x30 Blk  x30 Blk  x30   Add sq 5\"  x30 5\"  x30 5\"  x30 5\"  x30 5\"  x30 5\"  x30 5\"  x30 5\"  x30 5\"  x30 5\"  x30   Heel pushes 5\"  x30 5\"  x30 5\"  x30 5\"  x30 5\"  x30 5\"  x30 5\"  x30 5\"  x30 5\"  x30 5\"  x30   Hip IR Blk  x30 Blk  x30 Blk  x30 Blk  x30 Blk  x30 Blk  x30 Blk  x30 Blk  x30 Blk  x30 Blk  x30   Standing hip abd 3x10 3x10 3x10 3x10 NP NP NP 3x10 3x10 3x10   Ab bracing 5\"  3x10 5\"  x30 5\"  x30 5\"  x30 5\"  x30 NP 5\"  x30 5\"  x30 5\"  x30 5\"  x30     Seated hip IR Blk  3x10 Blk  3x10 Blk  3x10 Blk  3x10 Blk  x30 NP Blk  x30 Blk  x30 Blk  x30 Blk  x30   Clamshells x30 x30 x30 x30 x30 NP x30 x30 x30 x30   Reverse CS x30 x30 x30 x30 x30 NP x30 x30 x30 x30   Quad stretch 3x30\" 3x30\" 3x30\" NP NP NP NP NP NP 3x30\"                LTR w/towel roll     " 3x10 NP NP 3x10 3x10 3x10                                                                                 Modalities    10/1 10/3 10/7 10/10 10/15 10/17    MHP    15'  Pre  And  Post  To LB 15'  post 15'  Post  15' Deferred NP

## 2024-10-24 ENCOUNTER — OFFICE VISIT (OUTPATIENT)
Dept: PHYSICAL THERAPY | Facility: MEDICAL CENTER | Age: 69
End: 2024-10-24
Payer: MEDICARE

## 2024-10-24 DIAGNOSIS — M25.562 LEFT KNEE PAIN, UNSPECIFIED CHRONICITY: Primary | ICD-10-CM

## 2024-10-24 PROCEDURE — 97140 MANUAL THERAPY 1/> REGIONS: CPT | Performed by: PHYSICAL THERAPIST

## 2024-10-24 PROCEDURE — 97110 THERAPEUTIC EXERCISES: CPT | Performed by: PHYSICAL THERAPIST

## 2024-10-24 PROCEDURE — 97112 NEUROMUSCULAR REEDUCATION: CPT | Performed by: PHYSICAL THERAPIST

## 2024-10-24 NOTE — PROGRESS NOTES
"Daily Note     Today's date: 10/24/2024  Patient name: Randy Salas  : 1955  MRN: [id number]  Referring provider: Felipe Arana MD  Dx:   Encounter Diagnosis     ICD-10-CM    1. Left knee pain, unspecified chronicity  M25.562                      Subjective: Pt reports that he was feeling good until Tuesday and he has felt sore in his R hip/LB since PT.        Objective: See treatment diary below    + LLD corrected post tx     Assessment: Tolerated treatment well. Patient demonstrated fatigue post treatment, exhibited good technique with therapeutic exercises, and would benefit from continued PT      Plan: Continue per plan of care.      Precautions: None       Manuals 9/10 9/17 9/24 10/1 10/3 10/7 10/10 10/15 10/17 10/22 10/24   Long axis traction L LE KO KO KO KO KO HK NR KO NP KO HK   SL mobs     HK HK   HK AF R hip mobs   HK   Lumbar mobs      HK  STM  KO STM  KO STM  KO NP                 Ther Ex 9/10 9/17 9/24 10/1 10/3 10/7 10/10 10/15 10/17 10/22 10/24   Bike 10 min 10 min   10 min 10 min 10 min 10 min 10' 10' 10' 10' 10'   Bridging  x30 x30 x30 x30 x30 x30 x30 x30 x30 x30 x30   T-band abd Blk  x30 Blk  x30 Blk  x30 Blk  x30 Blk  x30 Blk  x30 Blk  x30 Blk  x30 Blk  x30 Blk  x30 Blk  x30   Add sq 5\"  x30 5\"  x30 5\"  x30 5\"  x30 5\"  x30 5\"  x30 5\"  x30 5\"  x30 5\"  x30 5\"  x30 5\"  x30   Heel pushes 5\"  x30 5\"  x30 5\"  x30 5\"  x30 5\"  x30 5\"  x30 5\"  x30 5\"  x30 5\"  x30 5\"  x30 5\"  x30   Hip IR Blk  x30 Blk  x30 Blk  x30 Blk  x30 Blk  x30 Blk  x30 Blk  x30 Blk  x30 Blk  x30 Blk  x30 Blk  x30   Standing hip abd 3x10 3x10 3x10 3x10 NP NP NP 3x10 3x10 3x10 3x10   Ab bracing 5\"  3x10 5\"  x30 5\"  x30 5\"  x30 5\"  x30 NP 5\"  x30 5\"  x30 5\"  x30 5\"  x30   5\"  x30   Seated hip IR Blk  3x10 Blk  3x10 Blk  3x10 Blk  3x10 Blk  x30 NP Blk  x30 Blk  x30 Blk  x30 Blk  x30 Blk  x30   Clamshells x30 x30 x30 x30 x30 NP x30 x30 x30 x30 x30   Reverse CS x30 x30 x30 x30 x30 NP x30 x30 x30 x30 x30   Quad stretch 3x30\" " "3x30\" 3x30\" NP NP NP NP NP NP 3x30\"                  LTR w/towel roll     3x10 NP NP 3x10 3x10 3x10 D/C                                                                                       Modalities    10/1 10/3 10/7 10/10 10/15 10/17     MHP    15'  Pre  And  Post  To LB 15'  post 15'  Post  15' Deferred NP                                                                                        "

## 2024-10-29 ENCOUNTER — OFFICE VISIT (OUTPATIENT)
Dept: PHYSICAL THERAPY | Facility: MEDICAL CENTER | Age: 69
End: 2024-10-29
Payer: MEDICARE

## 2024-10-29 DIAGNOSIS — M25.562 LEFT KNEE PAIN, UNSPECIFIED CHRONICITY: Primary | ICD-10-CM

## 2024-10-29 PROCEDURE — 97110 THERAPEUTIC EXERCISES: CPT

## 2024-10-29 PROCEDURE — 97140 MANUAL THERAPY 1/> REGIONS: CPT

## 2024-10-29 PROCEDURE — 97112 NEUROMUSCULAR REEDUCATION: CPT

## 2024-10-29 NOTE — PROGRESS NOTES
"Daily Note     Today's date: 10/29/2024  Patient name: Randy Salas  : 1955  MRN: [id number]  Referring provider: Felipe Arana MD  Dx:   Encounter Diagnosis     ICD-10-CM    1. Left knee pain, unspecified chronicity  M25.562                      Subjective: Pt reports that he continues to experience the pain in his R LB/SIJ with prolonged walking or doing yard work or other activities.  Pt also states that the pain still wakes him up at night.  Pt states that he felt some relief when he performed a distraction by hanging by his arms on a machine at the gym.        Objective: See treatment diary below      Assessment: Tolerated treatment well. Patient demonstrated fatigue post treatment, exhibited good technique with therapeutic exercises, and would benefit from continued PT  Pt felt some relief with use of the Theragun, but still felt tightness in his R SIJ.        Plan: Continue per plan of care.      Precautions: None       Manuals 10/29             Long axis traction L LE KO             R hip mobs KO             Lumbar mobs NP             Theragun to R LB KO             Ther Ex 10/29             Bike 10 min             Bridging  x30             T-band abd Blk  x30             Add sq 5\"  x30             Heel pushes 5\"  x30             Hip IR Blk  x30             Standing hip abd 3x10             Ab bracing 5\"  3x10             Seated hip IR Blk  3x10             Clamshells x30             Reverse CS x30             Quad stretch 3x30\"                                                                                                                             Modalities              MHP                                                                                                   "

## 2024-10-31 ENCOUNTER — OFFICE VISIT (OUTPATIENT)
Dept: PHYSICAL THERAPY | Facility: MEDICAL CENTER | Age: 69
End: 2024-10-31
Payer: MEDICARE

## 2024-10-31 DIAGNOSIS — M25.562 LEFT KNEE PAIN, UNSPECIFIED CHRONICITY: Primary | ICD-10-CM

## 2024-10-31 PROCEDURE — 97112 NEUROMUSCULAR REEDUCATION: CPT | Performed by: PHYSICAL THERAPIST

## 2024-10-31 PROCEDURE — 97140 MANUAL THERAPY 1/> REGIONS: CPT | Performed by: PHYSICAL THERAPIST

## 2024-10-31 PROCEDURE — 97110 THERAPEUTIC EXERCISES: CPT | Performed by: PHYSICAL THERAPIST

## 2024-10-31 NOTE — PROGRESS NOTES
"Daily Note     Today's date: 10/31/2024  Patient name: Randy Salas  : 1955  MRN: [id number]  Referring provider: Felipe Arana MD  Dx:   Encounter Diagnosis     ICD-10-CM    1. Left knee pain, unspecified chronicity  M25.562                      Subjective: Pt reports that he is feeling better, but his pain is not gone.  He feels it with twisting or quick movements.        Objective: See treatment diary below      Assessment: Tolerated treatment well. Patient demonstrated fatigue post treatment, exhibited good technique with therapeutic exercises, and would benefit from continued PT.  Pt responded well to manual treatment today.        Plan: Continue per plan of care.      Precautions: None       Manuals 10/29 10/31            Long axis traction L LE KO B  R then L  HK            R hip mobs KO NP            Lumbar mobs NP NP            Theragun to R LB KO NP            Ther Ex 10/29 10/31            Bike 10 min 10'            Bridging  x30 x30            T-band abd Blk  x30 Blk  x30            Add sq 5\"  x30 5\"  x30            Heel pushes 5\"  x30 5\"  x30            Hip IR Blk  x30 Blk  x30            Standing hip abd 3x10 x30            Ab bracing 5\"  3x10 5\"  x30            Seated hip IR Blk  3x10 Blk  x30            Clamshells x30 x30            Reverse CS x30 x30            Quad stretch 3x30\"                                                                                                                             Modalities              MHP                                                                                                     "

## 2024-11-05 ENCOUNTER — APPOINTMENT (OUTPATIENT)
Dept: PHYSICAL THERAPY | Facility: MEDICAL CENTER | Age: 69
End: 2024-11-05
Payer: MEDICARE

## 2024-11-07 ENCOUNTER — APPOINTMENT (OUTPATIENT)
Dept: PHYSICAL THERAPY | Facility: MEDICAL CENTER | Age: 69
End: 2024-11-07
Payer: MEDICARE

## 2024-11-12 ENCOUNTER — OFFICE VISIT (OUTPATIENT)
Dept: PHYSICAL THERAPY | Facility: MEDICAL CENTER | Age: 69
End: 2024-11-12
Payer: MEDICARE

## 2024-11-12 DIAGNOSIS — M25.562 LEFT KNEE PAIN, UNSPECIFIED CHRONICITY: Primary | ICD-10-CM

## 2024-11-12 PROCEDURE — 97112 NEUROMUSCULAR REEDUCATION: CPT

## 2024-11-12 PROCEDURE — 97110 THERAPEUTIC EXERCISES: CPT

## 2024-11-12 PROCEDURE — 97140 MANUAL THERAPY 1/> REGIONS: CPT

## 2024-11-12 NOTE — PROGRESS NOTES
"Daily Note     Today's date: 2024  Patient name: Randy Salas  : 1955  MRN: [id number]  Referring provider: Felipe Arana MD  Dx:   Encounter Diagnosis     ICD-10-CM    1. Left knee pain, unspecified chronicity  M25.562                      Subjective: Pt reports that his back is feeling better, but hasn't been able to do a lot of activities lately 2* being sick.        Objective: See treatment diary below      Assessment: Tolerated treatment well. Patient demonstrated fatigue post treatment, exhibited good technique with therapeutic exercises, and would benefit from continued PT  Pt is responding well to current tx plan.       Plan: Continue per plan of care.      Precautions: None       Manuals 10/29 10/31 11/12           Long axis traction L LE KO B  R then L  HK B  R  Then L  KO           R hip mobs KO NP NP           Lumbar mobs NP NP NP           Theragun to R LB KO NP NP           Ther Ex 10/29 10/31 11/12           Bike 10 min 10' 10'           Bridging  x30 x30 x30           T-band abd Blk  x30 Blk  x30 Blk  x30           Add sq 5\"  x30 5\"  x30 5\"  x30           Heel pushes 5\"  x30 5\"  x30 5\"  x30           Hip IR Blk  x30 Blk  x30 Blk  x30           Standing hip abd 3x10 x30 x30           Ab bracing 5\"  3x10 5\"  x30 5\"  x30           Seated hip IR Blk  3x10 Blk  x30 Blk  x30           Clamshells x30 x30 x30           Reverse CS x30 x30 x30           Quad stretch 3x30\" 3x30\" 3x30\"                                                                                                                           Modalities              MHP                                                                                                       "

## 2024-11-14 ENCOUNTER — OFFICE VISIT (OUTPATIENT)
Dept: PHYSICAL THERAPY | Facility: MEDICAL CENTER | Age: 69
End: 2024-11-14
Payer: MEDICARE

## 2024-11-14 DIAGNOSIS — M25.562 LEFT KNEE PAIN, UNSPECIFIED CHRONICITY: Primary | ICD-10-CM

## 2024-11-14 PROCEDURE — 97110 THERAPEUTIC EXERCISES: CPT | Performed by: PHYSICAL THERAPIST

## 2024-11-14 PROCEDURE — 97112 NEUROMUSCULAR REEDUCATION: CPT | Performed by: PHYSICAL THERAPIST

## 2024-11-14 PROCEDURE — 97140 MANUAL THERAPY 1/> REGIONS: CPT | Performed by: PHYSICAL THERAPIST

## 2024-11-14 NOTE — PROGRESS NOTES
"Daily Note     Today's date: 2024  Patient name: Randy Salas  : 1955  MRN: [id number]  Referring provider: Felipe Arana MD  Dx:   Encounter Diagnosis     ICD-10-CM    1. Left knee pain, unspecified chronicity  M25.562                      Subjective: Pt reports that he is feeling better.  He did a bunch of things yesterday and is only complaining of minor tightness today.       Objective: See treatment diary below      Assessment: Tolerated treatment well. Patient demonstrated fatigue post treatment, exhibited good technique with therapeutic exercises, and would benefit from continued PT      Plan: Continue per plan of care.      Precautions: None       Manuals 10/29 10/31 11/12 1/14          Long axis traction L LE KO B  R then L  HK B  R  Then L  KO B          R hip mobs KO NP NP NP          Lumbar mobs NP NP NP NP          Theragun to R LB KO NP NP NP          Ther Ex 10/29 10/31 11/12 11/14          Bike 10 min 10' 10' 10'          Bridging  x30 x30 x30 x30          T-band abd Blk  x30 Blk  x30 Blk  x30 Blk  x30          Add sq 5\"  x30 5\"  x30 5\"  x30 5\"  x30          Heel pushes 5\"  x30 5\"  x30 5\"  x30 5\"  x30          Hip IR Blk  x30 Blk  x30 Blk  x30 Blk  x30          Standing hip abd 3x10 x30 x30 x30          Ab bracing 5\"  3x10 5\"  x30 5\"  x30 5\"  x30          Seated hip IR Blk  3x10 Blk  x30 Blk  x30 Blk   x30          Clamshells x30 x30 x30 x30          Reverse CS x30 x30 x30 x30          Quad stretch 3x30\" 3x30\" 3x30\" 3x30\"                                                                                                                          Modalities              MHP                                                                                                         "

## 2024-11-21 ENCOUNTER — OFFICE VISIT (OUTPATIENT)
Dept: PHYSICAL THERAPY | Facility: MEDICAL CENTER | Age: 69
End: 2024-11-21
Payer: MEDICARE

## 2024-11-21 DIAGNOSIS — M25.562 LEFT KNEE PAIN, UNSPECIFIED CHRONICITY: Primary | ICD-10-CM

## 2024-11-21 PROCEDURE — 97110 THERAPEUTIC EXERCISES: CPT | Performed by: PHYSICAL THERAPIST

## 2024-11-21 PROCEDURE — 97140 MANUAL THERAPY 1/> REGIONS: CPT | Performed by: PHYSICAL THERAPIST

## 2024-11-21 PROCEDURE — 97112 NEUROMUSCULAR REEDUCATION: CPT | Performed by: PHYSICAL THERAPIST

## 2024-11-21 NOTE — PROGRESS NOTES
"Discharge Summary      Today's date: 2024  Patient name: Randy Salas  : 1955  MRN: [id number]  Referring provider: Felipe Arana MD  Dx:   Encounter Diagnosis     ICD-10-CM    1. Left knee pain, unspecified chronicity  M25.562                      Subjective: Pt reports that he is doing pretty well.   He has his sleep occasionally disturbed by back hip tightness, but if he moves around he is able to go back to sleep.         Objective: See treatment diary below      Assessment: Randy Salas has been compliant with attending PT and home exercise program since initial eval.  Randy  has made improvements in objective data since initial evalulation and has achieved all goals.  Patient reports having returned to their prior level or function.  It was mutually agreed to Discharge to home exercise program at this time.        Plan: D/C PT      Precautions: None       Manuals 10/29 10/31 11/12 1/14 1/21         Long axis traction L LE KO B  R then L  HK B  R  Then L  KO B B         R hip mobs KO NP NP NP          Lumbar mobs NP NP NP NP          Theragun to R LB KO NP NP NP          Ther Ex 10/29 10/31 11/12 11/14 11/21         Bike 10 min 10' 10' 10' 10'         Bridging  x30 x30 x30 x30 x30         T-band abd Blk  x30 Blk  x30 Blk  x30 Blk  x30 Blk  x30         Add sq 5\"  x30 5\"  x30 5\"  x30 5\"  x30 5\"  x30         Heel pushes 5\"  x30 5\"  x30 5\"  x30 5\"  x30 5\"  x30         Hip IR Blk  x30 Blk  x30 Blk  x30 Blk  x30 Blk  x30         Standing hip abd 3x10 x30 x30 x30 x30         Ab bracing 5\"  3x10 5\"  x30 5\"  x30 5\"  x30 5\"  x30         Seated hip IR Blk  3x10 Blk  x30 Blk  x30 Blk   x30 Blk  x30         Clamshells x30 x30 x30 x30 x30         Reverse CS x30 x30 x30 x30 x30         Quad stretch 3x30\" 3x30\" 3x30\" 3x30\" 3x30\"                                                                                                                         Modalities              MHP                                     "